# Patient Record
Sex: FEMALE | Race: WHITE | Employment: OTHER | URBAN - METROPOLITAN AREA
[De-identification: names, ages, dates, MRNs, and addresses within clinical notes are randomized per-mention and may not be internally consistent; named-entity substitution may affect disease eponyms.]

---

## 2018-03-16 PROBLEM — M51.26 HNP (HERNIATED NUCLEUS PULPOSUS), LUMBAR: Status: ACTIVE | Noted: 2018-03-16

## 2018-10-02 ENCOUNTER — HOSPITAL ENCOUNTER (OUTPATIENT)
Dept: GENERAL RADIOLOGY | Age: 68
Discharge: HOME | End: 2018-10-02
Attending: RADIOLOGY | Admitting: RADIOLOGY
Payer: MEDICARE

## 2018-10-02 ENCOUNTER — HOSPITAL ENCOUNTER (OUTPATIENT)
Dept: CT IMAGING | Age: 68
Discharge: HOME | End: 2018-10-02
Attending: NEUROLOGICAL SURGERY
Payer: MEDICARE

## 2018-10-02 VITALS
RESPIRATION RATE: 16 BRPM | WEIGHT: 231.06 LBS | SYSTOLIC BLOOD PRESSURE: 128 MMHG | BODY MASS INDEX: 38.45 KG/M2 | DIASTOLIC BLOOD PRESSURE: 60 MMHG | HEART RATE: 87 BPM | TEMPERATURE: 96.9 F | OXYGEN SATURATION: 98 %

## 2018-10-02 DIAGNOSIS — M54.16 LUMBAR RADICULOPATHY: ICD-10-CM

## 2018-10-02 PROCEDURE — 74011636320 HC RX REV CODE- 636/320: Performed by: RADIOLOGY

## 2018-10-02 PROCEDURE — 74011250636 HC RX REV CODE- 250/636: Performed by: RADIOLOGY

## 2018-10-02 PROCEDURE — 62304 MYELOGRAPHY LUMBAR INJECTION: CPT

## 2018-10-02 PROCEDURE — 74011250637 HC RX REV CODE- 250/637: Performed by: RADIOLOGY

## 2018-10-02 PROCEDURE — 72132 CT LUMBAR SPINE W/DYE: CPT

## 2018-10-02 RX ORDER — METOPROLOL SUCCINATE 50 MG/1
TABLET, EXTENDED RELEASE ORAL DAILY
COMMUNITY
End: 2021-08-05

## 2018-10-02 RX ORDER — ALENDRONATE SODIUM 35 MG/1
35 TABLET ORAL
COMMUNITY

## 2018-10-02 RX ORDER — OXYBUTYNIN CHLORIDE 5 MG/1
5 TABLET ORAL DAILY
COMMUNITY
End: 2021-08-05

## 2018-10-02 RX ORDER — HYDROCODONE BITARTRATE AND ACETAMINOPHEN 5; 325 MG/1; MG/1
1 TABLET ORAL
Status: DISCONTINUED | OUTPATIENT
Start: 2018-10-02 | End: 2018-10-02 | Stop reason: HOSPADM

## 2018-10-02 RX ORDER — AZELASTINE 1 MG/ML
2 SPRAY, METERED NASAL 2 TIMES DAILY
COMMUNITY
End: 2021-08-05

## 2018-10-02 RX ORDER — DIAZEPAM 5 MG/1
10 TABLET ORAL
Status: COMPLETED | OUTPATIENT
Start: 2018-10-02 | End: 2018-10-02

## 2018-10-02 RX ORDER — HYDROCODONE BITARTRATE AND ACETAMINOPHEN 5; 325 MG/1; MG/1
TABLET ORAL
COMMUNITY
End: 2021-08-05

## 2018-10-02 RX ORDER — LIDOCAINE HYDROCHLORIDE 10 MG/ML
5 INJECTION, SOLUTION EPIDURAL; INFILTRATION; INTRACAUDAL; PERINEURAL
Status: COMPLETED | OUTPATIENT
Start: 2018-10-02 | End: 2018-10-02

## 2018-10-02 RX ORDER — CYANOCOBALAMIN 1000 UG/ML
1000 INJECTION, SOLUTION INTRAMUSCULAR; SUBCUTANEOUS
COMMUNITY

## 2018-10-02 RX ADMIN — IOPAMIDOL 20 ML: 408 INJECTION, SOLUTION INTRATHECAL at 11:33

## 2018-10-02 RX ADMIN — HYDROCODONE BITARTRATE AND ACETAMINOPHEN 1 TABLET: 5; 325 TABLET ORAL at 12:29

## 2018-10-02 RX ADMIN — DIAZEPAM 10 MG: 5 TABLET ORAL at 11:17

## 2018-10-02 RX ADMIN — Medication 5 ML: at 11:33

## 2018-10-02 NOTE — H&P
The patient is an appropriate candidate to undergo myelogram.    Patient assessed immediately prior to induction. Anesthesia plan as follows: Local/No Anesthesia. History and Physical update:  H&P was reviewed and the patient was examined. No changes have occurred in the patient's condition since the H&P was completed.     Arnoldo Velasco MD  Vascular & Interventional Radiology  [facility]  10/2/2018

## 2018-10-02 NOTE — IP AVS SNAPSHOT
[address] [facility]       [address] [address]  [phone]     Patient: Tae Miller  MRN: [id number]  :1950           About your hospitalization     You were admitted on:  2018 You last received care in the:  [facility] 68 were discharged on:  2018       Why you were hospitalized     Your primary diagnosis was:  Not on File      Follow-up Information     Follow up With Details Comments Giselle Hicks MD   [address] [facility] [address]  [address] [facility] [phone]        Discharge Orders     None       A check yuval indicates which time of day the medication should be taken. My Medications      ASK your doctor about these medications        Instructions Each Dose to Equal    Morning Noon Evening Bedtime    alendronate 35 mg tablet   Commonly known as:  FOSAMAX       Your last dose was: Your next dose is: Take  by mouth every seven (7) days. ALPRAZolam 1 mg tablet   Commonly known as:  XANAX       Your last dose was: Your next dose is: Take 1 mg by mouth nightly as needed. 1 mg                        amitriptyline 25 mg tablet   Commonly known as:  ELAVIL       Your last dose was: Your next dose is: Take 25 mg by mouth nightly. 25 mg                        azelastine 137 mcg (0.1 %) nasal spray   Commonly known as:  ASTELIN       Your last dose was: Your next dose is: 2 Sprays two (2) times a day. Use in each nostril as directed    2 Spray                        cevimeline 30 mg capsule   Commonly known as:  [address]       Your last dose was: Your next dose is: Take 30 mg by mouth three (3) times daily. 30 mg                        cyanocobalamin 1,000 mcg/mL injection   Commonly known as:  VITAMIN B12       Your last dose was:          Your next dose is:              1,000 mcg by IntraMUSCular route once. 1000 mcg                        cyclobenzaprine 10 mg tablet   Commonly known as:  FLEXERIL       Your last dose was: Your next dose is: Take 1 Tab by mouth three (3) times daily as needed for Muscle Spasm(s). 10 mg                        CYMBALTA PO       Your last dose was: Your next dose is: Take 60 mg by mouth daily. 60 mg                        FOLIC ACID PO       Your last dose was: Your next dose is: Take 1 mg by mouth daily. 1 mg                        gabapentin 600 mg tablet   Commonly known as:  NEURONTIN       Your last dose was: Your next dose is: Take 800 mg by mouth two (2) times a day. 800 mg                        HYDROcodone-acetaminophen 5-325 mg per tablet   Commonly known as:  Harrison Side       Your last dose was: Your next dose is: Take  by mouth every six (6) hours as needed for Pain. LEXAPRO 20 mg tablet   Generic drug:  escitalopram oxalate       Your last dose was: Your next dose is: Take 20 mg by mouth daily. 20 mg                        lisinopril 5 mg tablet   Commonly known as:  Milon Kitchen       Your last dose was: Your next dose is: Take 5 mg by mouth daily. 5 mg                        metoprolol succinate 50 mg XL tablet   Commonly known as:  TOPROL-XL       Your last dose was: Your next dose is: Take  by mouth daily. metroNIDAZOLE 0.75 % topical gel   Commonly known as:  METROGEL       Your last dose was: Your next dose is:              Apply  to affected area two (2) times a day. NORVASC PO       Your last dose was: Your next dose is: Take 10 mg by mouth daily. 10 mg                        OMEPRAZOLE PO       Your last dose was:          Your next dose is: Take 20 mg by mouth daily. 20 mg                        oxybutynin 5 mg tablet   Commonly known as:  DITROPAN       Your last dose was: Your next dose is: Take 5 mg by mouth daily. 5 mg                        oxyCODONE-acetaminophen 5-325 mg per tablet   Commonly known as:  PERCOCET       Your last dose was: Your next dose is: Take 1-2 Tabs by mouth every six (6) hours as needed. Max Daily Amount: 8 Tabs. 1-2 Tab                        potassium chloride 20 mEq tablet   Commonly known as:  K-DUR, KLOR-CON       Your last dose was: Your next dose is: Take 20 mEq by mouth daily. 20 mEq                        REMICADE IV       Your last dose was: Your next dose is:              by IntraVENous route every two (2) months. SYNTHROID PO       Your last dose was: Your next dose is: Take 188 mcg by mouth daily. 188 mcg                        ZyrTEC 10 mg tablet   Generic drug:  cetirizine       Your last dose was: Your next dose is: Take 10 mg by mouth daily. 10 mg                                Opioid Education     Prescription Opioids: What You Need to Know:    Prescription opioids can be used to help relieve moderate-to-severe pain and are often prescribed following a surgery or injury, or for certain health conditions. These medications can be an important part of treatment but also come with serious risks. Opioids are strong pain medicines. Examples include hydrocodone, oxycodone, fentanyl, and morphine. Heroin is an example of an illegal opioid. It is important to work with your health care provider to make sure you are getting the safest, most effective care. WHAT ARE THE RISKS AND SIDE EFFECTS OF OPIOID USE? Prescription opioids carry serious risks of addiction and overdose, especially with prolonged use.   An opioid overdose, often marked by slow breathing, can cause sudden death. The use of prescription opioids can have a number of side effects as well, even when taken as directed. · Tolerance-meaning you might need to take more of a medication for the same pain relief  · Physical dependence-meaning you have symptoms of withdrawal when the medication is stopped. Withdrawal symptoms can include nausea, sweating, chills, diarrhea, stomach cramps, and muscle aches. Withdrawal can last up to several weeks, depending on which drug you took and how long you took it. · Increased sensitivity to pain  · Constipation  · Nausea, vomiting, and dry mouth  · Sleepiness and dizziness  · Confusion  · Depression  · Low levels of testosterone that can result in lower sex drive, energy, and strength  · Itching and sweating    RISKS ARE GREATER WITH:       · History of drug misuse, substance use disorder, or overdose  · Mental health conditions (such as depression or anxiety)  · Sleep apnea  · Older age (72 years or older)  · Pregnancy    Avoid alcohol while taking prescription opioids. Also, unless specifically advised by your health care provider, medications to avoid include:  · Benzodiazepines (such as Xanax or Valium)  · Muscle relaxants (such as Soma or Flexeril)  · Hypnotics (such as Ambien or Lunesta)  · Other prescription opioids    KNOW YOUR OPTIONS  Talk to your health care provider about ways to manage your pain that don't involve prescription opioids. Some of these options may actually work better and have fewer risks and side effects. Consult your physician before adding or stopping any medications, treatments, or physical activity. Options may include:  · Pain relievers such as acetaminophen, ibuprofen, and naproxen  · Some medications that are also used for depression or seizures  · Physical therapy and exercise  · Counseling to help patients learn how to cope better with triggers of pain and stress.   · Application of heat or cold compress  · Massage therapy  · Relaxation techniques     Be Informed  Make sure you know the name of your medication, how much and how often to take it, and its potential risks & side effects. IF YOU ARE PRESCRIBED OPIOIDS FOR PAIN:  · Never take opioids in greater amounts or more often than prescribed. Remember the goal is not to be pain-free but to manage your pain at a tolerable level. · Follow up with your primary care provider to:  · Work together to create a plan on how to manage your pain. · Talk about ways to help manage your pain that don't involve prescription opioids. · Talk about any and all concerns and side effects. · Help prevent misuse and abuse. · Never sell or share prescription opioids  · Help prevent misuse and abuse. · Store prescription opioids in a secure place and out of reach of others (this may include visitors, children, friends, and family). · Safely dispose of unused/unwanted prescription opioids: Find your community drug take-back program or your pharmacy mail-back program, or flush them down the toilet, following guidance from the Food and Drug Administration (www.fda.gov/Drugs/ResourcesForYou). · Visit www.cdc.gov/drugoverdose to learn about the risks of opioid abuse and overdose. · If you believe you may be struggling with addiction, tell your health care provider and ask for guidance or call [address] [place] [address] at [phone]. Discharge Instructions       . Willian Charles Please call the [facility] Radiology department at [phone] for specific questions following your exam.   Valium (Diazepam) Discharge instructions    You received the narcotic listed above.  This medication was given to you to help decrease your anxiety/discomfort and allow you to complete your examination       Common Side Effects:    Impairment of memory for up to 24 hours, sleepiness, slurred speech, dizziness, visual disturbance such as blurred vision or difficulty focusing, confusion, euphoria (pain relief), chills, ears feeling blocked or dreaming. It is recommended that you do not drive or operate equipment for at least 24 hours. You should not drink any alcoholic beverages for at least 24 hours. Do not take any other muscle relaxers, sedatives, hypnotics, or mood altering medications for 24 hours unless ordered by your physician who is aware that you received this medication. Call your physician for any questions or problems. He/She may contact this Radiology Department for further information. Ana Iniguez RN     Myelogram: About This Test  What is it? A myelogram uses X-rays and a special dye to make pictures of bones and nerves of the spine (spinal canal). The spinal canal holds the spinal cord, the spinal nerve roots, and the fluid-filled space between the bones in your spine. Why is this test done? A myelogram is done to check for:  · The cause of arm or leg numbness, weakness, or pain. · Narrowing of the spinal canal (spinal stenosis). · A tumor or infection causing problems with the spinal cord or nerve roots. · A spinal disc that has ruptured (herniated disc). · Inflammation of the membrane that covers the brain and spinal cord. · Problems with the blood vessels to the spine. How can you prepare for the test?  Your doctor will tell you if you need to change how much you eat and drink before the myelogram. You may be asked to increase the amount of water you drink before the test. Follow the instructions your doctor gives you about eating and drinking. Before a myelogram, tell your doctor if:  · You are allergic to any medicines, contrast material, or iodine dye. · You have had bleeding problems, or you take a blood thinner, such as aspirin, clopidogrel (Plavix), or warfarin (Coumadin), or you take over-the-counter medicines, such as ibuprofen (Advil, Motrin) or naproxen (Aleve).  Your doctor will tell you when you should stop taking these medicines several days before your procedure. Make sure that you understand exactly what he or she wants you to do. · You have had kidney problems. · You have diabetes, especially if you take metformin (Glucophage, for example). · You are or might be pregnant. Ask someone to take you home and stay with you after the test.  What happens during the test?  The dye is put into your spinal canal with a thin needle. This is called a lumbar puncture. The dye moves through the space so the nerve roots and spinal cord can be seen more clearly. After the dye is put in, you will lie still while the X-ray pictures are taken. How does it feel? You will feel a quick sting from a small needle that has medicine to numb the skin on your back. You will also feel some pressure as the long, thin spinal needle is put into your spinal canal. You may feel a quick sharp pain down your buttock or leg when the needle is moved in your spine. The dye may make you feel warm and flushed and have a metallic taste in your mouth. What else should you know about the test?  In rare cases, the hole made by the needle in the sac around the spine does not close normally. This can allow spinal fluid to leak out. This leak may need to be repaired through a procedure called an epidural blood patch. To do the patch, your doctor injects some of your own blood to cover the hole. How long does the test take? · A myelogram usually takes 30 minutes to 1 hour. What happens after the test?  · You will probably be able to go home 30 minutes to 2 hours after the test.  · You may need to lie in bed with your head raised for 4 to 24 hours after the test. To prevent seizures, which are a rare side effect, do not bend over or lie down with your head lower than your body.  Keeping your head higher than your body after a myelogram also may help prevent or reduce other side effects, such as headache, nausea, or vomiting. · Avoid strenuous activity, such as running or heavy lifting, for at least 1 day after your myelogram.  · Drink plenty of water after the myelogram. Your doctor will give you instructions on taking your regular medicines. When should you call for help? Call 911 anytime you think you may need emergency care. For example, call if:  · You have a seizure. Call your doctor now or seek immediate medical care if:  · You have any increase in pain, weakness, or numbness in your legs. · You have a severe headache or stiff neck, or your eyes become very sensitive to light. · You have a headache that lasts longer than 24 hours. · You have problems urinating or having a bowel movement. · You have a fever. Follow-up care is a key part of your treatment and safety. Be sure to make and go to all appointments, and call your doctor if you are having problems. It's also a good idea to keep a list of the medicines you take. Ask your doctor when you can expect to have your test results. Where can you learn more? Go to http://aleta-yusuf.info/. Enter Z180 in the search box to learn more about \"Myelogram: About This Test.\"  Current as of: October 9, 2017  Content Version: 11.7  © 9888-2411 [employer]. Care instructions adapted under license by [employer] (which disclaims liability or warranty for this information). If you have questions about a medical condition or this instruction, always ask your healthcare professional. [place] [address] any warranty or liability for your use of this information.     DISCHARGE SUMMARY from Nurse    PATIENT INSTRUCTIONS:    After general anesthesia or intravenous sedation, for 24 hours or while taking prescription Narcotics:  · Limit your activities  · Do not drive and operate hazardous machinery  · Do not make important personal or business decisions  · Do  not drink alcoholic beverages  · If you have not urinated within 8 hours after discharge, please contact your surgeon on call. Report the following to your surgeon:  · Excessive pain, swelling, redness or odor of or around the surgical area  · Temperature over 100.5  · Nausea and vomiting lasting longer than 4 hours or if unable to take medications  · Any signs of decreased circulation or nerve impairment to extremity: change in color, persistent  numbness, tingling, coldness or increase pain  · Any questions    What to do at Home:  Recommended activity: Activity as tolerated and no driving for today  These are general instructions for a healthy lifestyle:    No smoking/ No tobacco products/ Avoid exposure to second hand smoke  Surgeon General's Warning:  Quitting smoking now greatly reduces serious risk to your health. Obesity, smoking, and sedentary lifestyle greatly increases your risk for illness    A healthy diet, regular physical exercise & weight monitoring are important for maintaining a healthy lifestyle    You may be retaining fluid if you have a history of heart failure or if you experience any of the following symptoms:  Weight gain of 3 pounds or more overnight or 5 pounds in a week, increased swelling in our hands or feet or shortness of breath while lying flat in bed. Please call your doctor as soon as you notice any of these symptoms; do not wait until your next office visit. Recognize signs and symptoms of STROKE:    F-face looks uneven    A-arms unable to move or move unevenly    S-speech slurred or non-existent    T-time-call 911 as soon as signs and symptoms begin-DO NOT go       Back to bed or wait to see if you get better-TIME IS BRAIN. Warning Signs of HEART ATTACK     Call 911 if you have these symptoms:   Chest discomfort. Most heart attacks involve discomfort in the center of the chest that lasts more than a few minutes, or that goes away and comes back.  It can feel like uncomfortable pressure, squeezing, fullness, or pain.   Discomfort in other areas of the upper body. Symptoms can include pain or discomfort in one or both arms, the back, neck, jaw, or stomach.  Shortness of breath with or without chest discomfort.  Other signs may include breaking out in a cold sweat, nausea, or lightheadedness. Don't wait more than five minutes to call [address]! Fast action can save your life. Calling 911 is almost always the fastest way to get lifesaving treatment. Emergency Medical Services staff can begin treatment when they arrive  up to an hour sooner than if someone gets to the hospital by car. The discharge information has been reviewed with the patient. The patient verbalized understanding. Discharge medications reviewed with the patient and appropriate educational materials and side effects teaching were provided. Patient armband removed and given to patient to take home. Patient was informed of the privacy risks if armband lost or stolen. Introducing [facility]! [facility] introduces [employer] patient portal. Now you can access parts of your medical record, email your doctor's office, and request medication refills online. 1. In your internet browser, go to [email or web address] [employer]   2. Click on the First Time User? Click Here link in the Sign In box. You will see the New Member Sign Up page. 3. Enter your [employer] Access Code exactly as it appears below. You will not need to use this code after youve completed the sign-up process. If you do not sign up before the expiration date, you must request a new code. · [employer] Access Code: [email or web address]  Expires: 10/8/2018  1:03 PM    4. Enter the last four digits of your Social Security Number (xxxx) and Date of Birth (mm/dd/yyyy) as indicated and click Submit. You will be taken to the next sign-up page. 5. Create a [employer] ID.  This will be your [employer] login ID and cannot be changed, so think of one that is secure and easy to remember. 6. Create a [facility] password. You can change your password at any time. 7. Enter your Password Reset Question and Answer. This can be used at a later time if you forget your password. 8. Enter your e-mail address. You will receive e-mail notification when new information is available in [address] 9. Click Sign Up. You can now view and download portions of your medical record. 10. Click the Download Summary menu link to download a portable copy of your medical information. If you have questions, please visit the Frequently Asked Questions section of the [facility] website. Remember, [facility] is NOT to be used for urgent needs. For medical emergencies, dial 911. Now available from your iPhone and Android! Introducing Christopher Rizo       As a [employer] patient, I wanted to make you aware of our electronic visit tool called Christopher Rizo. [employer] 24/7 allows you to connect within minutes with a medical provider 24 hours a day, seven days a week via a mobile device or tablet or logging into a secure website from your computer. You can access Christopher Rizo from anywhere in the [place]. A virtual visit might be right for you when you have a simple condition and feel like you just dont want to get out of bed, or cant get away from work for an appointment, when your regular [employer] provider is not available (evenings, weekends or holidays), or when youre out of town and need minor care. Electronic visits cost only $49 and if the [employer] 24/7 provider determines a prescription is needed to treat your condition, one can be electronically transmitted to a nearby pharmacy*. Please take a moment to enroll today if you have not already done so. The enrollment process is free and takes just a few minutes. To enroll, please download the [employer] 24/7 alyson to your tablet or phone, or visit [email or web address] to enroll on your computer.     And, as an [address] [place] [address] patient with a [employer] account, the results of your visits will be scanned into your electronic medical record and your primary care provider will be able to view the scanned results. We urge you to continue to see your regular [facility] provider for your ongoing medical care. And while your primary care provider may not be the one available when you seek a Christopher Rizo virtual visit, the peace of mind you get from getting a real diagnosis real time can be priceless. For more information on Christopher Rizo, view our Frequently Asked Questions (FAQs) at [email or web address].     Sincerely,    Johnnie Bashir MD  Chief Medical Officer  Sandrine Calabrese     *:  certain medications cannot be prescribed via [facility] 24/7         Unresulted Labs-Please follow up with your PCP about these lab tests     Order Current Status    XR MYELO LUMBAR In process      Providers Seen During Your Hospitalization     Provider Specialty Primary office phone    Doreen Glover MD Radiology [phone]      Your Primary Care Physician (PCP)     Primary Care Physician Office Phone Office [address] [phone]      You are allergic to the following     Allergen Reactions    Other Food Hives  Unknown (comments)         Amoxicillin Rash         Erythromycin Anaphylaxis         Pcn (Penicillins) Anaphylaxis         Bactrim (Sulfamethoprim Ds) Unknown (comments)         Buckwheat Other (comments)         Cayenne Hives         Ceclor (Cefaclor) Unknown (comments)  Rash    Heavy rash         Cheese Other (comments)         Clarithromycin Unknown (comments)         Egg Runny Nose    Eyes water, runny nose         Fish Containing Products Rash    Jeanette         Gluten Other (comments)         Lactose Runny Nose    Eyes water, runny nose         Nuts (Nut - Unspecified) Hives  Runny Nose  Other (comments)    Pistachio also cause eyes to water Oats Other (comments)         Safflower Oil Hives         Sesame Seed Other (comments)         Sulfa (Sulfonamide Antibiotics) Unknown (comments)         Sulfamethoxazole-Trimethoprim Unknown (comments)  Rash    Rash         Sulfur Hives  Itching         Tetracycline Rash    Swelling         Tetracyclines Unknown (comments)         Wheat Other (comments)      Recent Documentation     Weight BMI OB Status Smoking Status          104.8 kg 38.45 kg/m2 Hysterectomy Never Smoker           Emergency Contacts       Name Discharge Info Relation Home Work [address] CAREGIVER [3] Spouse [3]   [phone]      Patient Belongings     The following personal items are in your possession at time of discharge:    Dental Appliances: None         Home Medications: None   Jewelry: None  Clothing: Pants, Shirt, Footwear    Other Valuables: Cane              Please provide this summary of care documentation to your next provider. Signatures-by signing, you are acknowledging that this After Visit Summary has been reviewed with you and you have received a copy.                       Patient Signature:  ____________________________________________________________    Date:  ____________________________________________________________      `           Provider Signature:  ____________________________________________________________    Date:  ____________________________________________________________

## 2018-10-02 NOTE — PROCEDURES
Vascular & Interventional Radiology Brief Procedure Note    Interventional Radiologist: Jan Hyman    Pre-operative Diagnosis:  Back pain    Post-operative Diagnosis: Same as pre-op dx    Procedure(s) Performed:  Lumbar myelogram    Anesthesia:  Local      Findings:  Successful lumbar myelogram via L3 puncture.      Complications: None    Estimated Blood Loss:  minimal    Tubes and Drains: None    Specimens: None    Condition: Good    Disposition:  home    Plan: observation/bedrest x4hrs      Yfn Farnsworth MD  [address] [address]  Vascular & Interventional Radiology  10/2/2018

## 2018-10-02 NOTE — PERIOP NOTES
Phase 2 Recovery Summary  Patient arrived to Phase 2 at 1210  Report received from No report received  Vitals:    10/02/18 1016 10/02/18 1210   BP: 153/80 126/59   Pulse: 93 87   Resp: 16 16   Temp: 96.9 °F (36.1 °C)    SpO2: 96% 97%   Weight: 104.8 kg (231 lb 1 oz)        oriented to time, place, person and situation    Lines and Drains  Peripheral Intravenous Line:      Wound  Wound Back Lower (Active)   Number of days:200       Wound Back (Active)   Number of days:200              Patient discharged to home with , Jamesfercholawrence Rivera  at [place] [address]

## 2018-10-02 NOTE — DISCHARGE INSTRUCTIONS
..Please call the [facility] Radiology department at [phone] for specific questions following your exam.   Valium (Diazepam) Discharge instructions    You received the narcotic listed above. This medication was given to you to help decrease your anxiety/discomfort and allow you to complete your examination       Common Side Effects:    Impairment of memory for up to 24 hours, sleepiness, slurred speech, dizziness, visual disturbance such as blurred vision or difficulty focusing, confusion, euphoria (pain relief), chills, ears feeling blocked or dreaming. It is recommended that you do not drive or operate equipment for at least 24 hours. You should not drink any alcoholic beverages for at least 24 hours. Do not take any other muscle relaxers, sedatives, hypnotics, or mood altering medications for 24 hours unless ordered by your physician who is aware that you received this medication. Call your physician for any questions or problems. He/She may contact this Radiology Department for further information. Alvarez Robertson RN     Myelogram: About This Test  What is it? A myelogram uses X-rays and a special dye to make pictures of bones and nerves of the spine (spinal canal). The spinal canal holds the spinal cord, the spinal nerve roots, and the fluid-filled space between the bones in your spine. Why is this test done? A myelogram is done to check for:  · The cause of arm or leg numbness, weakness, or pain. · Narrowing of the spinal canal (spinal stenosis). · A tumor or infection causing problems with the spinal cord or nerve roots. · A spinal disc that has ruptured (herniated disc). · Inflammation of the membrane that covers the brain and spinal cord. · Problems with the blood vessels to the spine.   How can you prepare for the test?  Your doctor will tell you if you need to change how much you eat and drink before the myelogram. You may be asked to increase the amount of water you drink before the test. Follow the instructions your doctor gives you about eating and drinking. Before a myelogram, tell your doctor if:  · You are allergic to any medicines, contrast material, or iodine dye. · You have had bleeding problems, or you take a blood thinner, such as aspirin, clopidogrel (Plavix), or warfarin (Coumadin), or you take over-the-counter medicines, such as ibuprofen (Advil, Motrin) or naproxen (Aleve). Your doctor will tell you when you should stop taking these medicines several days before your procedure. Make sure that you understand exactly what he or she wants you to do. · You have had kidney problems. · You have diabetes, especially if you take metformin (Glucophage, for example). · You are or might be pregnant. Ask someone to take you home and stay with you after the test.  What happens during the test?  The dye is put into your spinal canal with a thin needle. This is called a lumbar puncture. The dye moves through the space so the nerve roots and spinal cord can be seen more clearly. After the dye is put in, you will lie still while the X-ray pictures are taken. How does it feel? You will feel a quick sting from a small needle that has medicine to numb the skin on your back. You will also feel some pressure as the long, thin spinal needle is put into your spinal canal. You may feel a quick sharp pain down your buttock or leg when the needle is moved in your spine. The dye may make you feel warm and flushed and have a metallic taste in your mouth. What else should you know about the test?  In rare cases, the hole made by the needle in the sac around the spine does not close normally. This can allow spinal fluid to leak out. This leak may need to be repaired through a procedure called an epidural blood patch. To do the patch, your doctor injects some of your own blood to cover the hole. How long does the test take? · A myelogram usually takes 30 minutes to 1 hour.   What happens after the test?  · You will probably be able to go home 30 minutes to 2 hours after the test.  · You may need to lie in bed with your head raised for 4 to 24 hours after the test. To prevent seizures, which are a rare side effect, do not bend over or lie down with your head lower than your body. Keeping your head higher than your body after a myelogram also may help prevent or reduce other side effects, such as headache, nausea, or vomiting. · Avoid strenuous activity, such as running or heavy lifting, for at least 1 day after your myelogram.  · Drink plenty of water after the myelogram. Your doctor will give you instructions on taking your regular medicines. When should you call for help? Call 911 anytime you think you may need emergency care. For example, call if:  · You have a seizure. Call your doctor now or seek immediate medical care if:  · You have any increase in pain, weakness, or numbness in your legs. · You have a severe headache or stiff neck, or your eyes become very sensitive to light. · You have a headache that lasts longer than 24 hours. · You have problems urinating or having a bowel movement. · You have a fever. Follow-up care is a key part of your treatment and safety. Be sure to make and go to all appointments, and call your doctor if you are having problems. It's also a good idea to keep a list of the medicines you take. Ask your doctor when you can expect to have your test results. Where can you learn more? Go to http://aleta-yusuf.info/. Enter K021 in the search box to learn more about \"Myelogram: About This Test.\"  Current as of: October 9, 2017  Content Version: 11.7  © 1982-1874 [employer]. Care instructions adapted under license by [employer] (which disclaims liability or warranty for this information).  If you have questions about a medical condition or this instruction, always ask your healthcare professional. [employer] disclaims any warranty or liability for your use of this information. DISCHARGE SUMMARY from Nurse    PATIENT INSTRUCTIONS:    After general anesthesia or intravenous sedation, for 24 hours or while taking prescription Narcotics:  · Limit your activities  · Do not drive and operate hazardous machinery  · Do not make important personal or business decisions  · Do  not drink alcoholic beverages  · If you have not urinated within 8 hours after discharge, please contact your surgeon on call. Report the following to your surgeon:  · Excessive pain, swelling, redness or odor of or around the surgical area  · Temperature over 100.5  · Nausea and vomiting lasting longer than 4 hours or if unable to take medications  · Any signs of decreased circulation or nerve impairment to extremity: change in color, persistent  numbness, tingling, coldness or increase pain  · Any questions    What to do at Home:  Recommended activity: Activity as tolerated and no driving for today  These are general instructions for a healthy lifestyle:    No smoking/ No tobacco products/ Avoid exposure to second hand smoke  Surgeon General's Warning:  Quitting smoking now greatly reduces serious risk to your health. Obesity, smoking, and sedentary lifestyle greatly increases your risk for illness    A healthy diet, regular physical exercise & weight monitoring are important for maintaining a healthy lifestyle    You may be retaining fluid if you have a history of heart failure or if you experience any of the following symptoms:  Weight gain of 3 pounds or more overnight or 5 pounds in a week, increased swelling in our hands or feet or shortness of breath while lying flat in bed. Please call your doctor as soon as you notice any of these symptoms; do not wait until your next office visit.     Recognize signs and symptoms of STROKE:    F-face looks uneven    A-arms unable to move or move unevenly    S-speech slurred or non-existent    T-time-call 911 as soon as signs and symptoms begin-DO NOT go       Back to bed or wait to see if you get better-TIME IS BRAIN. Warning Signs of HEART ATTACK     Call 911 if you have these symptoms:   Chest discomfort. Most heart attacks involve discomfort in the center of the chest that lasts more than a few minutes, or that goes away and comes back. It can feel like uncomfortable pressure, squeezing, fullness, or pain.  Discomfort in other areas of the upper body. Symptoms can include pain or discomfort in one or both arms, the back, neck, jaw, or stomach.  Shortness of breath with or without chest discomfort.  Other signs may include breaking out in a cold sweat, nausea, or lightheadedness. Don't wait more than five minutes to call 911 - MINUTES MATTER! Fast action can save your life. Calling 911 is almost always the fastest way to get lifesaving treatment. Emergency Medical Services staff can begin treatment when they arrive -- up to an hour sooner than if someone gets to the hospital by car. The discharge information has been reviewed with the patient. The patient verbalized understanding. Discharge medications reviewed with the patient and appropriate educational materials and side effects teaching were provided. Patient armband removed and given to patient to take home. Patient was informed of the privacy risks if armband lost or stolen.

## 2018-10-26 ENCOUNTER — HOSPITAL ENCOUNTER (OUTPATIENT)
Dept: CT IMAGING | Age: 68
Discharge: HOME | End: 2018-10-26
Attending: RADIOLOGY | Admitting: RADIOLOGY
Payer: MEDICARE

## 2018-10-26 VITALS
HEIGHT: 65 IN | SYSTOLIC BLOOD PRESSURE: 140 MMHG | HEART RATE: 69 BPM | TEMPERATURE: 97 F | WEIGHT: 244 LBS | BODY MASS INDEX: 40.65 KG/M2 | RESPIRATION RATE: 16 BRPM | DIASTOLIC BLOOD PRESSURE: 75 MMHG | OXYGEN SATURATION: 92 %

## 2018-10-26 DIAGNOSIS — M54.50 LOW BACK PAIN: ICD-10-CM

## 2018-10-26 LAB
ANION GAP SERPL CALC-SCNC: 6 MMOL/L (ref 3–18)
APTT PPP: 33.5 SEC (ref 23–36.4)
BUN SERPL-MCNC: 13 MG/DL (ref 7–18)
BUN/CREAT SERPL: 19 (ref 12–20)
CALCIUM SERPL-MCNC: 9.5 MG/DL (ref 8.5–10.1)
CHLORIDE SERPL-SCNC: 102 MMOL/L (ref 100–108)
CO2 SERPL-SCNC: 30 MMOL/L (ref 21–32)
CREAT SERPL-MCNC: 0.7 MG/DL (ref 0.6–1.3)
ERYTHROCYTE [DISTWIDTH] IN BLOOD BY AUTOMATED COUNT: 15.7 % (ref 11.6–14.5)
GLUCOSE SERPL-MCNC: 88 MG/DL (ref 74–99)
HCT VFR BLD AUTO: 36.3 % (ref 35–45)
HGB BLD-MCNC: 11.8 G/DL (ref 12–16)
INR PPP: 0.9 (ref 0.8–1.2)
MCH RBC QN AUTO: 33.3 PG (ref 24–34)
MCHC RBC AUTO-ENTMCNC: 32.5 G/DL (ref 31–37)
MCV RBC AUTO: 102.5 FL (ref 74–97)
PLATELET # BLD AUTO: 359 K/UL (ref 135–420)
PMV BLD AUTO: 9.2 FL (ref 9.2–11.8)
POTASSIUM SERPL-SCNC: 4.7 MMOL/L (ref 3.5–5.5)
PROTHROMBIN TIME: 11.6 SEC (ref 11.5–15.2)
RBC # BLD AUTO: 3.54 M/UL (ref 4.2–5.3)
SODIUM SERPL-SCNC: 138 MMOL/L (ref 136–145)
WBC # BLD AUTO: 7.8 K/UL (ref 4.6–13.2)

## 2018-10-26 PROCEDURE — 74011250636 HC RX REV CODE- 250/636: Performed by: RADIOLOGY

## 2018-10-26 PROCEDURE — 85027 COMPLETE CBC AUTOMATED: CPT | Performed by: RADIOLOGY

## 2018-10-26 PROCEDURE — 87102 FUNGUS ISOLATION CULTURE: CPT | Performed by: NEUROLOGICAL SURGERY

## 2018-10-26 PROCEDURE — 88173 CYTOPATH EVAL FNA REPORT: CPT | Performed by: NEUROLOGICAL SURGERY

## 2018-10-26 PROCEDURE — 87116 MYCOBACTERIA CULTURE: CPT | Performed by: NEUROLOGICAL SURGERY

## 2018-10-26 PROCEDURE — 88305 TISSUE EXAM BY PATHOLOGIST: CPT | Performed by: NEUROLOGICAL SURGERY

## 2018-10-26 PROCEDURE — 87070 CULTURE OTHR SPECIMN AEROBIC: CPT | Performed by: NEUROLOGICAL SURGERY

## 2018-10-26 PROCEDURE — 85610 PROTHROMBIN TIME: CPT | Performed by: RADIOLOGY

## 2018-10-26 PROCEDURE — 80048 BASIC METABOLIC PNL TOTAL CA: CPT | Performed by: RADIOLOGY

## 2018-10-26 PROCEDURE — 99152 MOD SED SAME PHYS/QHP 5/>YRS: CPT

## 2018-10-26 PROCEDURE — 85730 THROMBOPLASTIN TIME PARTIAL: CPT | Performed by: RADIOLOGY

## 2018-10-26 RX ORDER — HYDROCODONE BITARTRATE AND ACETAMINOPHEN 7.5; 325 MG/1; MG/1
1 TABLET ORAL
Status: CANCELLED | OUTPATIENT
Start: 2018-10-26

## 2018-10-26 RX ORDER — FENTANYL CITRATE 50 UG/ML
25-200 INJECTION, SOLUTION INTRAMUSCULAR; INTRAVENOUS
Status: DISCONTINUED | OUTPATIENT
Start: 2018-10-26 | End: 2018-10-30 | Stop reason: HOSPADM

## 2018-10-26 RX ORDER — LIDOCAINE HYDROCHLORIDE 10 MG/ML
1-20 INJECTION, SOLUTION EPIDURAL; INFILTRATION; INTRACAUDAL; PERINEURAL
Status: COMPLETED | OUTPATIENT
Start: 2018-10-26 | End: 2018-10-26

## 2018-10-26 RX ORDER — MIDAZOLAM HYDROCHLORIDE 1 MG/ML
.5-4 INJECTION, SOLUTION INTRAMUSCULAR; INTRAVENOUS
Status: DISCONTINUED | OUTPATIENT
Start: 2018-10-26 | End: 2018-10-30 | Stop reason: HOSPADM

## 2018-10-26 RX ORDER — SODIUM CHLORIDE 9 MG/ML
20 INJECTION, SOLUTION INTRAVENOUS CONTINUOUS
Status: DISCONTINUED | OUTPATIENT
Start: 2018-10-26 | End: 2018-10-30 | Stop reason: HOSPADM

## 2018-10-26 RX ORDER — HYDROCODONE BITARTRATE AND ACETAMINOPHEN 7.5; 325 MG/1; MG/1
1 TABLET ORAL
Status: DISCONTINUED | OUTPATIENT
Start: 2018-10-26 | End: 2018-10-30 | Stop reason: HOSPADM

## 2018-10-26 RX ADMIN — FENTANYL CITRATE 50 MCG: 50 INJECTION, SOLUTION INTRAMUSCULAR; INTRAVENOUS at 10:04

## 2018-10-26 RX ADMIN — MIDAZOLAM 1 MG: 1 INJECTION INTRAMUSCULAR; INTRAVENOUS at 10:04

## 2018-10-26 RX ADMIN — SODIUM CHLORIDE 20 ML/HR: 900 INJECTION, SOLUTION INTRAVENOUS at 07:38

## 2018-10-26 RX ADMIN — MIDAZOLAM 1 MG: 1 INJECTION INTRAMUSCULAR; INTRAVENOUS at 10:10

## 2018-10-26 RX ADMIN — LIDOCAINE HYDROCHLORIDE 10 ML: 10 INJECTION, SOLUTION EPIDURAL; INFILTRATION; INTRACAUDAL; PERINEURAL at 10:14

## 2018-10-26 RX ADMIN — FENTANYL CITRATE 50 MCG: 50 INJECTION, SOLUTION INTRAMUSCULAR; INTRAVENOUS at 09:58

## 2018-10-26 NOTE — H&P
VASCULAR & INTERVENTIONAL RADIOLOGY PROGRESS NOTE    History and Physical reviewed; I have examined the patient and there are no pertinent changes. Pt is an appropriate candidate to undergo right lumbosacral lesion asp/bx under moderate sedation.       Fran Ferguson MD, MD  Vascular & Interventional Radiology  [facility]  10/26/2018

## 2018-10-26 NOTE — PERIOP NOTES
Phase 2 Recovery Summary  Patient arrived to Phase 2 at 1042  Report received from [place]:    10/26/18 1100 10/26/18 1115 10/26/18 1130 10/26/18 1200   BP: 144/69 149/74 139/69 140/75   Pulse:       Resp:       Temp:       SpO2: 97% 96% 95% 92%   Weight:       Height:           oriented to time, place, person and situation    Lines and Drains  Peripheral Intravenous Line:   Peripheral IV 10/26/18 Left Hand (Active)   Site Assessment Clean, dry, & intact 10/26/2018 10:54 AM   Phlebitis Assessment 0 10/26/2018 10:54 AM   Infiltration Assessment 0 10/26/2018 10:54 AM   Dressing Status Clean, dry, & intact 10/26/2018 10:54 AM   Dressing Type Tape;Transparent 10/26/2018 10:54 AM   Hub Color/Line Status Pink; Infusing 10/26/2018 10:54 AM       Wound  Wound Back Lower (Active)   Number of days: 224       Wound Back (Active)   Number of days: 224          Patient discharged to home with , Steph Lopez  at [address] [facility]

## 2018-10-26 NOTE — PROGRESS NOTES
TRANSFER - OUT REPORT:    Verbal report given to Moni MEI(name) on Heather Marinelli  being transferred to [facility] POD 2(unit) for routine progression of care       Report consisted of patients Situation, Background, Assessment and   Recommendations(SBAR). Information from the following report(s) SBAR, Procedure Summary, MAR and Cardiac Rhythm SR was reviewed with the receiving nurse. Lines:   Peripheral IV 10/26/18 Left Hand (Active)   Site Assessment Clean, dry, & intact 10/26/2018  7:38 AM   Phlebitis Assessment 0 10/26/2018  7:38 AM   Infiltration Assessment 0 10/26/2018  7:38 AM   Dressing Status Clean, dry, & intact 10/26/2018  7:38 AM   Dressing Type Transparent;Tape 10/26/2018  7:38 AM   Hub Color/Line Status Orange; Infusing 10/26/2018  7:38 AM        Opportunity for questions and clarification was provided.       Patient transported with:   [email or web address]

## 2018-10-26 NOTE — PROGRESS NOTES
Pt educated about procedure and sedation, verbalizes understanding. Pt denies complications from sedation/anesthesia in the past. Denies use of anticoagulants/antiplatelet medications. Per RN Romero, heart tones and lung sounds within normal limits, pt denies abdominal pain, skin warm and dry. Will continue to monitor.

## 2018-10-26 NOTE — PERIOP NOTES
Report received from [place], [place] Nurse. She will request pain med order and discharge times from Dr. Sandra Shields. Pt arrived by tech. Alert. Oriented to room. Discussed POC. , Janelle Alfredo by bedside.

## 2018-10-26 NOTE — Clinical Note
RN Romero left message with Estefani Galindo at [facility] to have Dr. Evan Prado or his PA Mikey Mendoza to call us stat regarding orders for specimen.

## 2018-10-31 LAB
BACTERIA SPEC CULT: NORMAL
GRAM STN SPEC: NORMAL
GRAM STN SPEC: NORMAL
SERVICE CMNT-IMP: NORMAL

## 2018-11-26 LAB
BACTERIA SPEC CULT: NORMAL
FUNGUS SMEAR,FNGSMR: NORMAL
SERVICE CMNT-IMP: NORMAL

## 2018-12-08 LAB
ACID FAST STN SPEC: NEGATIVE
MYCOBACTERIUM SPEC QL CULT: NEGATIVE
SPECIMEN PREPARATION: NORMAL
SPECIMEN SOURCE: NORMAL

## 2021-08-27 PROBLEM — M48.04 THORACIC STENOSIS: Status: ACTIVE | Noted: 2021-08-27

## 2021-09-09 PROBLEM — A41.9 SEPSIS (HCC): Status: ACTIVE | Noted: 2021-09-09

## 2021-10-14 ENCOUNTER — APPOINTMENT (OUTPATIENT)
Dept: GENERAL RADIOLOGY | Age: 71
DRG: 862 | End: 2021-10-14
Attending: STUDENT IN AN ORGANIZED HEALTH CARE EDUCATION/TRAINING PROGRAM
Payer: MEDICARE

## 2021-10-14 ENCOUNTER — HOSPITAL ENCOUNTER (INPATIENT)
Age: 71
LOS: 7 days | Discharge: NURSING, REHAB OR LONG-TERM CARE | DRG: 862 | End: 2021-10-21
Attending: STUDENT IN AN ORGANIZED HEALTH CARE EDUCATION/TRAINING PROGRAM | Admitting: FAMILY MEDICINE
Payer: MEDICARE

## 2021-10-14 ENCOUNTER — APPOINTMENT (OUTPATIENT)
Dept: CT IMAGING | Age: 71
DRG: 862 | End: 2021-10-14
Attending: STUDENT IN AN ORGANIZED HEALTH CARE EDUCATION/TRAINING PROGRAM
Payer: MEDICARE

## 2021-10-14 DIAGNOSIS — N39.0 URINARY TRACT INFECTION WITHOUT HEMATURIA, SITE UNSPECIFIED: ICD-10-CM

## 2021-10-14 DIAGNOSIS — M79.89 LEG SWELLING: ICD-10-CM

## 2021-10-14 DIAGNOSIS — R41.82 ALTERED MENTAL STATUS, UNSPECIFIED ALTERED MENTAL STATUS TYPE: ICD-10-CM

## 2021-10-14 DIAGNOSIS — M79.606 PAIN OF LOWER EXTREMITY, UNSPECIFIED LATERALITY: ICD-10-CM

## 2021-10-14 DIAGNOSIS — E87.1 HYPONATREMIA: Primary | ICD-10-CM

## 2021-10-14 PROBLEM — A41.9 SEPSIS (HCC): Status: ACTIVE | Noted: 2021-10-14

## 2021-10-14 LAB
ALBUMIN SERPL-MCNC: 2 G/DL (ref 3.5–5)
ALBUMIN/GLOB SERPL: 0.4 {RATIO} (ref 1.1–2.2)
ALP SERPL-CCNC: 124 U/L (ref 45–117)
ALT SERPL-CCNC: 13 U/L (ref 12–78)
ANION GAP SERPL CALC-SCNC: 8 MMOL/L (ref 5–15)
APPEARANCE UR: ABNORMAL
AST SERPL W P-5'-P-CCNC: 25 U/L (ref 15–37)
BACTERIA URNS QL MICRO: ABNORMAL /HPF
BASOPHILS # BLD: 0 K/UL (ref 0–0.1)
BASOPHILS NFR BLD: 0 % (ref 0–1)
BILIRUB SERPL-MCNC: 0.4 MG/DL (ref 0.2–1)
BILIRUB UR QL: NEGATIVE
BUN SERPL-MCNC: 11 MG/DL (ref 6–20)
BUN/CREAT SERPL: 21 (ref 12–20)
CA-I BLD-MCNC: 8.6 MG/DL (ref 8.5–10.1)
CHLORIDE SERPL-SCNC: 89 MMOL/L (ref 97–108)
CK SERPL-CCNC: 89 U/L (ref 26–192)
CO2 SERPL-SCNC: 27 MMOL/L (ref 21–32)
COLOR UR: ABNORMAL
COVID-19 RAPID TEST, COVR: NOT DETECTED
CREAT SERPL-MCNC: 0.52 MG/DL (ref 0.55–1.02)
DIFFERENTIAL METHOD BLD: ABNORMAL
EOSINOPHIL # BLD: 0.3 K/UL (ref 0–0.4)
EOSINOPHIL NFR BLD: 2 % (ref 0–7)
ERYTHROCYTE [DISTWIDTH] IN BLOOD BY AUTOMATED COUNT: 15.9 % (ref 11.5–14.5)
GLOBULIN SER CALC-MCNC: 4.9 G/DL (ref 2–4)
GLUCOSE SERPL-MCNC: 77 MG/DL (ref 65–100)
GLUCOSE UR STRIP.AUTO-MCNC: NEGATIVE MG/DL
HCT VFR BLD AUTO: 28.5 % (ref 35–47)
HGB BLD-MCNC: 9.1 G/DL (ref 11.5–16)
HGB UR QL STRIP: ABNORMAL
IMM GRANULOCYTES # BLD AUTO: 0 K/UL
IMM GRANULOCYTES NFR BLD AUTO: 0 %
KETONES UR QL STRIP.AUTO: 5 MG/DL
LACTATE SERPL-SCNC: 0.7 MMOL/L (ref 0.4–2)
LEUKOCYTE ESTERASE UR QL STRIP.AUTO: ABNORMAL
LIPASE SERPL-CCNC: 70 U/L (ref 73–393)
LYMPHOCYTES # BLD: 1.5 K/UL (ref 0.8–3.5)
LYMPHOCYTES NFR BLD: 10 % (ref 12–49)
MAGNESIUM SERPL-MCNC: 2 MG/DL (ref 1.6–2.4)
MAGNESIUM SERPL-MCNC: 2.1 MG/DL (ref 1.6–2.4)
MCH RBC QN AUTO: 27.8 PG (ref 26–34)
MCHC RBC AUTO-ENTMCNC: 31.9 G/DL (ref 30–36.5)
MCV RBC AUTO: 87.2 FL (ref 80–99)
MONOCYTES # BLD: 0.9 K/UL (ref 0–1)
MONOCYTES NFR BLD: 6 % (ref 5–13)
MUCOUS THREADS URNS QL MICRO: ABNORMAL /LPF
MYELOCYTES NFR BLD MANUAL: 2 %
NEUTS BAND NFR BLD MANUAL: 2 % (ref 0–6)
NEUTS SEG # BLD: 12 K/UL (ref 1.8–8)
NEUTS SEG NFR BLD: 78 % (ref 32–75)
NITRITE UR QL STRIP.AUTO: NEGATIVE
NRBC # BLD: 0 K/UL (ref 0–0.01)
NRBC BLD-RTO: 0 PER 100 WBC
OTHER URINE MICRO,5051: 1
OTHER,OTHU: ABNORMAL
PH UR STRIP: 5 [PH] (ref 5–8)
PLATELET # BLD AUTO: 824 K/UL (ref 150–400)
PLATELET COMMENTS,PCOM: ABNORMAL
PMV BLD AUTO: 9.7 FL (ref 8.9–12.9)
POTASSIUM SERPL-SCNC: 5.1 MMOL/L (ref 3.5–5.1)
PROT SERPL-MCNC: 6.9 G/DL (ref 6.4–8.2)
PROT UR STRIP-MCNC: 100 MG/DL
RBC # BLD AUTO: 3.27 M/UL (ref 3.8–5.2)
RBC #/AREA URNS HPF: ABNORMAL /HPF (ref 0–5)
RBC MORPH BLD: ABNORMAL
SARS-COV-2, COV2: NORMAL
SODIUM SERPL-SCNC: 124 MMOL/L (ref 136–145)
SP GR UR REFRACTOMETRY: 1.02 (ref 1–1.03)
SPECIMEN SOURCE: NORMAL
TROPONIN-HIGH SENSITIVITY: 33 NG/L (ref 0–51)
TROPONIN-HIGH SENSITIVITY: 41 NG/L (ref 0–51)
UA: UC IF INDICATED,UAUC: ABNORMAL
UROBILINOGEN UR QL STRIP.AUTO: 0.1 EU/DL (ref 0.1–1)
WBC # BLD AUTO: 15 K/UL (ref 3.6–11)
WBC URNS QL MICRO: ABNORMAL /HPF (ref 0–4)

## 2021-10-14 PROCEDURE — 74011000636 HC RX REV CODE- 636: Performed by: STUDENT IN AN ORGANIZED HEALTH CARE EDUCATION/TRAINING PROGRAM

## 2021-10-14 PROCEDURE — 83690 ASSAY OF LIPASE: CPT

## 2021-10-14 PROCEDURE — 74011250636 HC RX REV CODE- 250/636: Performed by: STUDENT IN AN ORGANIZED HEALTH CARE EDUCATION/TRAINING PROGRAM

## 2021-10-14 PROCEDURE — 81001 URINALYSIS AUTO W/SCOPE: CPT

## 2021-10-14 PROCEDURE — 87040 BLOOD CULTURE FOR BACTERIA: CPT

## 2021-10-14 PROCEDURE — 87086 URINE CULTURE/COLONY COUNT: CPT

## 2021-10-14 PROCEDURE — 70450 CT HEAD/BRAIN W/O DYE: CPT

## 2021-10-14 PROCEDURE — 96374 THER/PROPH/DIAG INJ IV PUSH: CPT

## 2021-10-14 PROCEDURE — 83880 ASSAY OF NATRIURETIC PEPTIDE: CPT

## 2021-10-14 PROCEDURE — 65270000029 HC RM PRIVATE

## 2021-10-14 PROCEDURE — 82550 ASSAY OF CK (CPK): CPT

## 2021-10-14 PROCEDURE — 85025 COMPLETE CBC W/AUTO DIFF WBC: CPT

## 2021-10-14 PROCEDURE — 74011250636 HC RX REV CODE- 250/636: Performed by: FAMILY MEDICINE

## 2021-10-14 PROCEDURE — 87186 SC STD MICRODIL/AGAR DIL: CPT

## 2021-10-14 PROCEDURE — 87635 SARS-COV-2 COVID-19 AMP PRB: CPT

## 2021-10-14 PROCEDURE — 36415 COLL VENOUS BLD VENIPUNCTURE: CPT

## 2021-10-14 PROCEDURE — 84484 ASSAY OF TROPONIN QUANT: CPT

## 2021-10-14 PROCEDURE — 74177 CT ABD & PELVIS W/CONTRAST: CPT

## 2021-10-14 PROCEDURE — 86141 C-REACTIVE PROTEIN HS: CPT

## 2021-10-14 PROCEDURE — 83605 ASSAY OF LACTIC ACID: CPT

## 2021-10-14 PROCEDURE — 99284 EMERGENCY DEPT VISIT MOD MDM: CPT

## 2021-10-14 PROCEDURE — 80053 COMPREHEN METABOLIC PANEL: CPT

## 2021-10-14 PROCEDURE — 87077 CULTURE AEROBIC IDENTIFY: CPT

## 2021-10-14 PROCEDURE — 74011000250 HC RX REV CODE- 250: Performed by: STUDENT IN AN ORGANIZED HEALTH CARE EDUCATION/TRAINING PROGRAM

## 2021-10-14 PROCEDURE — 83735 ASSAY OF MAGNESIUM: CPT

## 2021-10-14 PROCEDURE — 51702 INSERT TEMP BLADDER CATH: CPT

## 2021-10-14 PROCEDURE — 71045 X-RAY EXAM CHEST 1 VIEW: CPT

## 2021-10-14 RX ORDER — OXYCODONE AND ACETAMINOPHEN 10; 325 MG/1; MG/1
TABLET ORAL
COMMUNITY

## 2021-10-14 RX ORDER — ACETAMINOPHEN 325 MG/1
650 TABLET ORAL
Status: DISCONTINUED | OUTPATIENT
Start: 2021-10-14 | End: 2021-10-21 | Stop reason: HOSPADM

## 2021-10-14 RX ORDER — BACLOFEN 10 MG/1
5 TABLET ORAL 3 TIMES DAILY
COMMUNITY
End: 2021-10-21

## 2021-10-14 RX ORDER — ESCITALOPRAM OXALATE 20 MG/1
20 TABLET ORAL DAILY
COMMUNITY

## 2021-10-14 RX ORDER — LEVOTHYROXINE SODIUM 150 UG/1
TABLET ORAL
COMMUNITY

## 2021-10-14 RX ORDER — GABAPENTIN 800 MG/1
TABLET ORAL 3 TIMES DAILY
COMMUNITY
End: 2021-10-21

## 2021-10-14 RX ORDER — CYANOCOBALAMIN 1000 UG/ML
1000 INJECTION, SOLUTION INTRAMUSCULAR; SUBCUTANEOUS ONCE
COMMUNITY
End: 2021-10-21

## 2021-10-14 RX ORDER — HYDROXYZINE 50 MG/ML
25 INJECTION, SOLUTION INTRAMUSCULAR
Status: DISCONTINUED | OUTPATIENT
Start: 2021-10-14 | End: 2021-10-21 | Stop reason: HOSPADM

## 2021-10-14 RX ORDER — MEMANTINE HYDROCHLORIDE 10 MG/1
TABLET ORAL DAILY
COMMUNITY

## 2021-10-14 RX ORDER — MEMANTINE HYDROCHLORIDE 10 MG/1
10 TABLET ORAL 2 TIMES DAILY
Status: DISCONTINUED | OUTPATIENT
Start: 2021-10-14 | End: 2021-10-21 | Stop reason: HOSPADM

## 2021-10-14 RX ORDER — AMITRIPTYLINE HYDROCHLORIDE 25 MG/1
TABLET, FILM COATED ORAL
COMMUNITY

## 2021-10-14 RX ORDER — ALENDRONATE SODIUM 35 MG/1
TABLET ORAL
COMMUNITY
End: 2021-10-21

## 2021-10-14 RX ORDER — BISACODYL 5 MG
5 TABLET, DELAYED RELEASE (ENTERIC COATED) ORAL DAILY PRN
COMMUNITY
End: 2021-10-21

## 2021-10-14 RX ORDER — LISINOPRIL 5 MG/1
TABLET ORAL DAILY
COMMUNITY

## 2021-10-14 RX ORDER — PANTOPRAZOLE SODIUM 40 MG/1
40 TABLET, DELAYED RELEASE ORAL DAILY
Status: DISCONTINUED | OUTPATIENT
Start: 2021-10-15 | End: 2021-10-20

## 2021-10-14 RX ORDER — MORPHINE SULFATE 4 MG/ML
4 INJECTION INTRAVENOUS ONCE
Status: COMPLETED | OUTPATIENT
Start: 2021-10-14 | End: 2021-10-14

## 2021-10-14 RX ORDER — CARVEDILOL 6.25 MG/1
TABLET ORAL 2 TIMES DAILY WITH MEALS
COMMUNITY

## 2021-10-14 RX ORDER — METRONIDAZOLE 500 MG/100ML
500 INJECTION, SOLUTION INTRAVENOUS ONCE
Status: COMPLETED | OUTPATIENT
Start: 2021-10-14 | End: 2021-10-14

## 2021-10-14 RX ORDER — RIFAMPIN 300 MG/1
CAPSULE ORAL
COMMUNITY

## 2021-10-14 RX ORDER — CEVIMELINE HYDROCHLORIDE 30 MG/1
30 CAPSULE ORAL 3 TIMES DAILY
COMMUNITY

## 2021-10-14 RX ORDER — ESCITALOPRAM OXALATE 10 MG/1
20 TABLET ORAL DAILY
Status: DISCONTINUED | OUTPATIENT
Start: 2021-10-15 | End: 2021-10-21 | Stop reason: HOSPADM

## 2021-10-14 RX ORDER — ACETAMINOPHEN 650 MG/1
650 SUPPOSITORY RECTAL
Status: DISCONTINUED | OUTPATIENT
Start: 2021-10-14 | End: 2021-10-21 | Stop reason: HOSPADM

## 2021-10-14 RX ORDER — OMEPRAZOLE 20 MG/1
20 CAPSULE, DELAYED RELEASE ORAL DAILY
COMMUNITY
End: 2021-10-21

## 2021-10-14 RX ORDER — PREDNISONE 5 MG/1
TABLET ORAL
COMMUNITY

## 2021-10-14 RX ORDER — ONDANSETRON 4 MG/1
4 TABLET, ORALLY DISINTEGRATING ORAL
Status: DISCONTINUED | OUTPATIENT
Start: 2021-10-14 | End: 2021-10-21 | Stop reason: HOSPADM

## 2021-10-14 RX ORDER — DULOXETIN HYDROCHLORIDE 30 MG/1
60 CAPSULE, DELAYED RELEASE ORAL DAILY
Status: DISCONTINUED | OUTPATIENT
Start: 2021-10-15 | End: 2021-10-21 | Stop reason: HOSPADM

## 2021-10-14 RX ORDER — CARVEDILOL 3.12 MG/1
6.25 TABLET ORAL 2 TIMES DAILY WITH MEALS
Status: DISCONTINUED | OUTPATIENT
Start: 2021-10-15 | End: 2021-10-17 | Stop reason: SDUPTHER

## 2021-10-14 RX ORDER — DONEPEZIL HYDROCHLORIDE 5 MG/1
10 TABLET, FILM COATED ORAL
Status: DISCONTINUED | OUTPATIENT
Start: 2021-10-14 | End: 2021-10-21 | Stop reason: HOSPADM

## 2021-10-14 RX ORDER — SODIUM CHLORIDE 9 MG/ML
75 INJECTION, SOLUTION INTRAVENOUS CONTINUOUS
Status: DISCONTINUED | OUTPATIENT
Start: 2021-10-14 | End: 2021-10-20

## 2021-10-14 RX ORDER — PREDNISONE 5 MG/1
5 TABLET ORAL
Status: DISCONTINUED | OUTPATIENT
Start: 2021-10-15 | End: 2021-10-21 | Stop reason: HOSPADM

## 2021-10-14 RX ORDER — ONDANSETRON 2 MG/ML
4 INJECTION INTRAMUSCULAR; INTRAVENOUS
Status: DISCONTINUED | OUTPATIENT
Start: 2021-10-14 | End: 2021-10-21 | Stop reason: HOSPADM

## 2021-10-14 RX ORDER — MELOXICAM 15 MG/1
15 TABLET ORAL DAILY
COMMUNITY
End: 2021-10-21

## 2021-10-14 RX ORDER — POLYETHYLENE GLYCOL 3350 17 G/17G
17 POWDER, FOR SOLUTION ORAL DAILY PRN
Status: DISCONTINUED | OUTPATIENT
Start: 2021-10-14 | End: 2021-10-21 | Stop reason: HOSPADM

## 2021-10-14 RX ORDER — DONEPEZIL HYDROCHLORIDE 5 MG/1
TABLET, FILM COATED ORAL
COMMUNITY

## 2021-10-14 RX ORDER — DULOXETIN HYDROCHLORIDE 60 MG/1
60 CAPSULE, DELAYED RELEASE ORAL DAILY
COMMUNITY

## 2021-10-14 RX ORDER — HEPARIN SODIUM 5000 [USP'U]/ML
5000 INJECTION, SOLUTION INTRAVENOUS; SUBCUTANEOUS EVERY 8 HOURS
Status: DISCONTINUED | OUTPATIENT
Start: 2021-10-14 | End: 2021-10-21 | Stop reason: HOSPADM

## 2021-10-14 RX ORDER — LEVOFLOXACIN 5 MG/ML
500 INJECTION, SOLUTION INTRAVENOUS EVERY 24 HOURS
Status: DISCONTINUED | OUTPATIENT
Start: 2021-10-14 | End: 2021-10-15

## 2021-10-14 RX ORDER — HYDROMORPHONE HYDROCHLORIDE 1 MG/ML
1 INJECTION, SOLUTION INTRAMUSCULAR; INTRAVENOUS; SUBCUTANEOUS ONCE
Status: COMPLETED | OUTPATIENT
Start: 2021-10-14 | End: 2021-10-14

## 2021-10-14 RX ORDER — CYANOCOBALAMIN 1000 UG/ML
1000 INJECTION, SOLUTION INTRAMUSCULAR; SUBCUTANEOUS ONCE
Status: ACTIVE | OUTPATIENT
Start: 2021-10-14 | End: 2021-10-15

## 2021-10-14 RX ORDER — CEVIMELINE HYDROCHLORIDE 30 MG/1
30 CAPSULE ORAL 3 TIMES DAILY
Status: DISCONTINUED | OUTPATIENT
Start: 2021-10-14 | End: 2021-10-21 | Stop reason: HOSPADM

## 2021-10-14 RX ORDER — LEVOTHYROXINE SODIUM 75 UG/1
150 TABLET ORAL
Status: DISCONTINUED | OUTPATIENT
Start: 2021-10-15 | End: 2021-10-20

## 2021-10-14 RX ORDER — ALBUTEROL SULFATE 90 UG/1
2 AEROSOL, METERED RESPIRATORY (INHALATION)
Status: DISCONTINUED | OUTPATIENT
Start: 2021-10-14 | End: 2021-10-21 | Stop reason: HOSPADM

## 2021-10-14 RX ORDER — ALBUTEROL SULFATE 90 UG/1
AEROSOL, METERED RESPIRATORY (INHALATION)
COMMUNITY

## 2021-10-14 RX ORDER — LORAZEPAM 0.5 MG/1
TABLET ORAL
COMMUNITY
End: 2021-10-21

## 2021-10-14 RX ORDER — LISINOPRIL 5 MG/1
5 TABLET ORAL DAILY
Status: DISCONTINUED | OUTPATIENT
Start: 2021-10-15 | End: 2021-10-17 | Stop reason: SDUPTHER

## 2021-10-14 RX ORDER — CETIRIZINE HCL 10 MG
TABLET ORAL
COMMUNITY
End: 2021-10-21

## 2021-10-14 RX ORDER — RIFAMPIN 300 MG/1
300 CAPSULE ORAL
Status: DISCONTINUED | OUTPATIENT
Start: 2021-10-15 | End: 2021-10-21 | Stop reason: HOSPADM

## 2021-10-14 RX ADMIN — METRONIDAZOLE 500 MG: 500 INJECTION, SOLUTION INTRAVENOUS at 20:02

## 2021-10-14 RX ADMIN — HEPARIN SODIUM 5000 UNITS: 5000 INJECTION INTRAVENOUS; SUBCUTANEOUS at 22:19

## 2021-10-14 RX ADMIN — MORPHINE SULFATE 4 MG: 4 INJECTION, SOLUTION INTRAMUSCULAR; INTRAVENOUS at 22:18

## 2021-10-14 RX ADMIN — VANCOMYCIN HYDROCHLORIDE 1000 MG: 1 INJECTION, POWDER, LYOPHILIZED, FOR SOLUTION INTRAVENOUS at 22:21

## 2021-10-14 RX ADMIN — CEFEPIME HYDROCHLORIDE 1 G: 1 INJECTION, POWDER, FOR SOLUTION INTRAMUSCULAR; INTRAVENOUS at 20:02

## 2021-10-14 RX ADMIN — MORPHINE SULFATE 4 MG: 4 INJECTION, SOLUTION INTRAMUSCULAR; INTRAVENOUS at 17:16

## 2021-10-14 RX ADMIN — LEVOFLOXACIN 500 MG: 5 INJECTION, SOLUTION INTRAVENOUS at 21:06

## 2021-10-14 RX ADMIN — HYDROMORPHONE HYDROCHLORIDE 1 MG: 1 INJECTION, SOLUTION INTRAMUSCULAR; INTRAVENOUS; SUBCUTANEOUS at 23:10

## 2021-10-14 RX ADMIN — SODIUM CHLORIDE 75 ML/HR: 9 INJECTION, SOLUTION INTRAVENOUS at 21:07

## 2021-10-14 RX ADMIN — IOPAMIDOL 100 ML: 755 INJECTION, SOLUTION INTRAVENOUS at 19:11

## 2021-10-14 NOTE — ED PROVIDER NOTES
EMERGENCY DEPARTMENT HISTORY AND PHYSICAL EXAM      Date: 10/14/2021  Patient Name: Esthela Curtis    History of Presenting Illness     Chief Complaint   Patient presents with    Altered mental status       HPI: Esthela Curtis, 73 y.o. female with a history of CAD status post CABG and a recent spinal surgery at T11 presenting today for altered mental status and pain. Patient is confused at baseline but has been more confused for the past 3 days. She had a T11 vertebral surgery on 8/27 at an outside facility that was complicated by infection status post washout on 9/7. She has been in rehab on antibiotics currently. For the past 3 days she has been more confused. She was sent here from rehab for elevated white count. The patient does not provide any history and says \"I hurt all over\". She reports that the pain is most severe in the abdomen but also reports back pain. No other history was obtained from the patient due to AMS. Patient had her surgeries in [facility] by Dr. Patricia Sharma. PCP: Gena Meléndez MD    Current Facility-Administered Medications   Medication Dose Route Frequency Provider Last Rate Last Admin    vancomycin (VANCOCIN) 1,000 mg in 0.9% sodium chloride 250 mL (VIAL-MATE)  1,000 mg IntraVENous ONCE Benitez Thomas MD        cefepime (MAXIPIME) 1 g in sterile water (preservative free) 10 mL IV syringe  1 g IntraVENous ONCE Benitez Thomas MD        metroNIDAZOLE (FLAGYL) IVPB premix 500 mg  500 mg IntraVENous Benitez Jiménez MD         Current Outpatient Medications   Medication Sig Dispense Refill    alendronate (FOSAMAX) 35 mg tablet Take  by mouth every seven (7) days.  amitriptyline (ELAVIL) 25 mg tablet Take  by mouth nightly.  LORazepam (Ativan) 0.5 mg tablet Take  by mouth.  baclofen (LIORESAL) 10 mg tablet Take 5 mg by mouth three (3) times daily.  carvediloL (COREG) 6.25 mg tablet Take  by mouth two (2) times daily (with meals).       cetirizine (ZYRTEC) 10 mg tablet Take  by mouth.  cevimeline (EVOXAC) 30 mg capsule Take 30 mg by mouth three (3) times daily.  cyanocobalamin (VITAMIN B12) 1,000 mcg/mL injection 1,000 mcg by IntraMUSCular route once.  donepeziL (ARICEPT) 5 mg tablet Take  by mouth nightly.  bisacodyL (Dulcolax, bisacodyl,) 5 mg EC tablet Take 5 mg by mouth daily as needed for Constipation.  DULoxetine (CYMBALTA) 60 mg capsule Take 60 mg by mouth daily.  escitalopram oxalate (LEXAPRO) 20 mg tablet Take 20 mg by mouth daily.  gabapentin (NEURONTIN) 800 mg tablet Take  by mouth three (3) times daily.  levothyroxine (SYNTHROID) 150 mcg tablet Take  by mouth Daily (before breakfast).  lisinopriL (PRINIVIL, ZESTRIL) 5 mg tablet Take  by mouth daily.  meloxicam (MOBIC) 15 mg tablet Take 15 mg by mouth daily.  memantine (Namenda) 10 mg tablet Take  by mouth daily.  omeprazole (PRILOSEC) 20 mg capsule Take 20 mg by mouth daily.  oxyCODONE-acetaminophen (PERCOCET 10)  mg per tablet Take  by mouth.  predniSONE (DELTASONE) 5 mg tablet Take  by mouth.  albuterol (Proventil HFA) 90 mcg/actuation inhaler Take  by inhalation.  rifAMPin (RIFADIN) 300 mg capsule Take  by mouth Before breakfast and dinner.  sennosides 8.6 mg cap Take  by mouth. Medical History   I reviewed the medical, surgical, family, and social history, as well as allergies:    Past Medical History:  Past Medical History:   Diagnosis Date    Dorsalgia     Intervertebral disc disease     Spinal stenosis        Past Surgical History:  No past surgical history on file. Family History:  No family history on file. Social History:  Social History     Tobacco Use    Smoking status: Not on file   Substance Use Topics    Alcohol use: Not on file    Drug use: Not on file       Allergies:   Allergies   Allergen Reactions    [place] Unknown (comments)    Amoxicillin Unknown (comments)    Buckwheat Unknown (comments)    Cayenne Unknown (comments)    Cefaclor Unknown (comments)    Cheese Unknown (comments)    Clarithromycin Unknown (comments)    Egg Unknown (comments)    Erythromycin Unknown (comments)    Fish Containing Products Other (comments)    Gluten Unknown (comments)    Lactose Unknown (comments)    Oats Unknown (comments)    Penicillins Unknown (comments)    Safflower Oil Unknown (comments)    Sesame Seed Unknown (comments)    Sulfa (Sulfonamide Antibiotics) Unknown (comments)    Sulfamethoxazole-Trimethoprim Unknown (comments)    Tetracycline Unknown (comments)    Wheat Unknown (comments)       Review of Systems     Review of Systems   Unable to perform ROS: Mental status change         Physical Exam and Vital Signs   Vital Signs - Reviewed the patient's vital signs. Patient Vitals for the past 12 hrs:   Temp Pulse Resp BP SpO2   10/14/21 1425 98.3 °F (36.8 °C) 78 18 (!) 184/80 95 %       Physical Exam:    GENERAL: awake, alert, confused, ANO x1, tracks around the room, moaning in pain  HEENT:  * Pupils equal, EOMI  * Head atraumatic  CV:  * regular rhythm  * warm and perfused extremities bilaterally  PULMONARY: Good air movement, no wheezes or crackles  ABDOMEN: He was tenderness  : No suprapubic tenderness  BACK: Vertebral surgery site does not appear to have any cellulitis or abscess without any purulence  EXTREMITIES/BACK: warm and perfused, no tenderness, no edema  SKIN: no rashes or signs of trauma  NEURO:  * Speech clear but is confused and ANO x1  * Moves U&LE to command      Medical Decision Making and ED Course   - I am the first and primary provider for this patient and am the primary provider of record. - I reviewed the vital signs, available nursing notes, past medical history, past surgical history, family history and social history. - Initial assessment performed.  The patients presenting problems have been discussed, and the staff are in agreement with the care plan formulated and outlined with them. I have encouraged them to ask questions as they arise throughout their visit. - Available medical records, nursing notes, old EKGs, and EMS run sheets (if patient was EMS transported) were reviewed    MDM:   Patient is a 73 y.o. female presenting for pain and confusion after recent spinal surgery and infection currently on antibiotics. Vitals reveal related blood pressure and physical exam reveals tenderness over the abdomen. Based on the history, physical exam, risk factors, and vitals signs, differential includes: Intra-abdominal infection, sepsis, spinal epidural abscess, ICH, electrode disturbances, metabolic abnormalities. Results     Labs:     Recent Results (from the past 12 hour(s))   CBC WITH AUTOMATED DIFF    Collection Time: 10/14/21  5:00 PM   Result Value Ref Range    WBC 15.0 (H) 3.6 - 11.0 K/uL    RBC 3.27 (L) 3.80 - 5.20 M/uL    HGB 9.1 (L) 11.5 - 16.0 g/dL    HCT 28.5 (L) 35.0 - 47.0 %    MCV 87.2 80.0 - 99.0 FL    MCH 27.8 26.0 - 34.0 PG    MCHC 31.9 30.0 - 36.5 g/dL    RDW 15.9 (H) 11.5 - 14.5 %    PLATELET 345 (H) 535 - 400 K/uL    MPV 9.7 8.9 - 12.9 FL    NRBC 0.0 0.0  WBC    ABSOLUTE NRBC 0.00 0.00 - 0.01 K/uL    NEUTROPHILS 78 (H) 32 - 75 %    BAND NEUTROPHILS 2 0 - 6 %    LYMPHOCYTES 10 (L) 12 - 49 %    MONOCYTES 6 5 - 13 %    EOSINOPHILS 2 0 - 7 %    BASOPHILS 0 0 - 1 %    MYELOCYTES 2 (H) 0 %    IMMATURE GRANULOCYTES 0 %    ABS. NEUTROPHILS 12.0 (H) 1.8 - 8.0 K/UL    ABS. LYMPHOCYTES 1.5 0.8 - 3.5 K/UL    ABS. MONOCYTES 0.9 0.0 - 1.0 K/UL    ABS. EOSINOPHILS 0.3 0.0 - 0.4 K/UL    ABS. BASOPHILS 0.0 0.0 - 0.1 K/UL    ABS. IMM.  GRANS. 0.0 K/UL    PLATELET COMMENTS Large Platelets      RBC COMMENTS Anisocytosis  1+        DF Manual     METABOLIC PANEL, COMPREHENSIVE    Collection Time: 10/14/21  5:00 PM   Result Value Ref Range    Sodium 124 (L) 136 - 145 mmol/L    Potassium 5.1 3.5 - 5.1 mmol/L    Chloride 89 (L) 97 - 108 mmol/L CO2 27 21 - 32 mmol/L    Anion gap 8 5 - 15 mmol/L    Glucose 77 65 - 100 mg/dL    BUN 11 6 - 20 mg/dL    Creatinine 0.52 (L) 0.55 - 1.02 mg/dL    BUN/Creatinine ratio 21 (H) 12 - 20      GFR est AA >60 >60 ml/min/1.73m2    GFR est non-AA >60 >60 ml/min/1.73m2    Calcium 8.6 8.5 - 10.1 mg/dL    Bilirubin, total 0.4 0.2 - 1.0 mg/dL    AST (SGOT) 25 15 - 37 U/L    ALT (SGPT) 13 12 - 78 U/L    Alk.  phosphatase 124 (H) 45 - 117 U/L    Protein, total 6.9 6.4 - 8.2 g/dL    Albumin 2.0 (L) 3.5 - 5.0 g/dL    Globulin 4.9 (H) 2.0 - 4.0 g/dL    A-G Ratio 0.4 (L) 1.1 - 2.2     MAGNESIUM    Collection Time: 10/14/21  5:00 PM   Result Value Ref Range    Magnesium 2.0 1.6 - 2.4 mg/dL   LACTIC ACID    Collection Time: 10/14/21  5:00 PM   Result Value Ref Range    Lactic acid 0.7 0.4 - 2.0 mmol/L   TROPONIN-HIGH SENSITIVITY    Collection Time: 10/14/21  5:00 PM   Result Value Ref Range    Troponin-High Sensitivity 33 0 - 51 ng/L   URINALYSIS W/ REFLEX CULTURE    Collection Time: 10/14/21  5:00 PM    Specimen: Urine   Result Value Ref Range    Color Amy      Appearance Turbid (A) Clear      Specific gravity 1.024 1.003 - 1.030      pH (UA) 5.0 5.0 - 8.0      Protein 100 (A) Negative mg/dL    Glucose Negative Negative mg/dL    Ketone 5 (A) Negative mg/dL    Bilirubin Negative Negative      Blood Moderate (A) Negative      Urobilinogen 0.1 0.1 - 1.0 EU/dL    Nitrites Negative Negative      Leukocyte Esterase Moderate (A) Negative      UA:UC IF INDICATED Urine Culture Ordered (A) Culture not indicated by UA result      WBC  0 - 4 /hpf    RBC  0 - 5 /hpf    Bacteria 4+ (A) Negative /hpf    Mucus 4+ /lpf    Other Urine Micro 1      Other: Renal Epithelial cells present     CK    Collection Time: 10/14/21  5:00 PM   Result Value Ref Range    CK 89 26 - 192 U/L   MAGNESIUM    Collection Time: 10/14/21  5:00 PM   Result Value Ref Range    Magnesium 2.1 1.6 - 2.4 mg/dL   LIPASE    Collection Time: 10/14/21  5:00 PM   Result Value Ref Range    Lipase 70 (L) 73 - 393 U/L   SARS-COV-2    Collection Time: 10/14/21  6:42 PM   Result Value Ref Range    SARS-CoV-2 Please find results under separate order         Radiologic Studies:  CT Results  (Last 48 hours)               10/14/21 1911  CT ABD PELV W CONT Final result    Impression:  No acute findings. Constipation       Narrative:  CT dose reduction was achieved through use of a standardized protocol tailored   for this examination and automatic exposure control for dose modulation. Contrast study shows clear lung bases       Liver, spleen, pancreas are normal. Gallbladder is out with mild subsequent   biliary dilatation. Adrenals and right kidney are normal. Focal cortical scar   upper pole of left kidney. No small bowel or vascular abnormality,   lymphadenopathy or free fluid. Gastric staples       Uterus is out. No mass or free fluid. Catheterize bladder is empty. The entire   colon is mildly distended with mostly stool, and some gas and fluid. No   mesenteric fat edema or abdominal wall hernia. Advanced degenerative changes in the postoperative spine           10/14/21 1616  CT HEAD WO CONT Final result    Impression:  No acute intracranial abnormality. Narrative:  Study: Head CT without contrast.       Clinical Indication: Altered mental status. Comparison: None available. Technique: Routine volume acquisition of the head was performed without contrast   using soft tissue and bone kernels. Coronal and sagittal reconstructions were   generated and reviewed. Dose reduction: All CT scans at this facility are   performed using dose reduction optimization techniques as appropriate to a   performed exam including the following-automated exposure control, adjustments   of mA and/or Kv according to patient size, or use of iterative reconstructive   technique. Findings:       The ventricles are normal in size and configuration. No midline shift.  The basal cisterns are patent. No acute hemorrhage, abnormal   extra-axial fluid, or evidence of large territorial ischemia. Absent native lenses. The included paranasal sinuses and mastoid air cells are   clear. No evidence of an aggressive calvarial or extracalvarial lesion. CXR Results  (Last 48 hours)               10/14/21 1627  XR CHEST PORT Final result    Impression:  1. Possible lingular airspace opacity could represent pneumonia or atelectasis. 2. Mild interstitial pulmonary edema. Narrative:  Exam: XR CHEST PORT         Indication:  Altered mental status; Comparison: None       Findings:       Cardiomegaly mediastinal silhouette is mildly enlarged given the projection. Thickening of the pulmonary interstitium with mild cephalization of the   pulmonary vessels. Suggestion of a lingular airspace opacity. No pneumothorax. Cervical and thoracic lumbar fusion hardware surgical clips are seen within the   left upper quadrant. Right upper extremity PICC line is noted terminating at the   SVC. No pleural effusion. No pneumothorax. Medications ordered:  Medications   vancomycin (VANCOCIN) 1,000 mg in 0.9% sodium chloride 250 mL (VIAL-MATE) (has no administration in time range)   cefepime (MAXIPIME) 1 g in sterile water (preservative free) 10 mL IV syringe (has no administration in time range)   metroNIDAZOLE (FLAGYL) IVPB premix 500 mg (has no administration in time range)   morphine injection 4 mg (4 mg IntraVENous Given 10/14/21 1716)   iopamidoL (ISOVUE-370) 76 % injection 100 mL (100 mL IntraVENous Given 10/14/21 1911)        ED Course     ED Course:     ED Course as of Oct 14 2000   Thu Oct 14, 2021   1817 CBC shows a white count of 15. Urinalysis shows moderate leukocyte esterase with 4+ bacteria. Will treat for UTI.    [SS]   1901 Patient was given Vanco, cefepime, and Flagyl to cover for UTI and intra-abdominal infection.   Troponin is 33, will repeat. Lactate is within normal limits. No concern for severe sepsis, septic shock, or ischemia. Lipase is not significantly elevated. CPK does not reflect any significant rhabdomyolysis. Magnesium within normal limits. CMP shows a sodium of 124 with no other abnormalities. No evidence of hepatitis. [SS]      ED Course User Index  [SS] Yissel Olivier MD       Reassessment / Disposition / Discussion:    CT did not show any intra-abdominal pathology or any evidence of infection on the T-spine overlying the surgery. Patient has a UTI and will be treated for urine infection likely causing her symptoms. Her white count is elevated and the patient has been on antibiotics which might affect sirs criteria. She also has hyponatremia with unknown baseline. Will admit for further management. Final Disposition     Disposition: Condition stable  Admitted to Floor Medical Floor the case was discussed with the admitting physician Dr. Santana Ramirez. ADMISSION: After completion of ED workup and discussion of results and diagnoses with the patient, patient was admitted to the hospital. All the patient's questions were answered. Case was discussed with the receiving team.      Diagnosis     Clinical Impression:   1. Hyponatremia    2. Urinary tract infection without hematuria, site unspecified    3. Altered mental status, unspecified altered mental status type        Attestations:    Erika Guevara MD    Please note that this dictation was completed with Directa Plus, the computer voice recognition software. Quite often unanticipated grammatical, syntax, homophones, and other interpretive errors are inadvertently transcribed by the computer software. Please disregard these errors. Please excuse any errors that have escaped final proofreading. Thank you.

## 2021-10-14 NOTE — ED TRIAGE NOTES
Pt arrives by EMS from [facility] for elevated WBC. Pt had recent spinal surgery 3w ago. Wound has stitches and she won't let anyone touch it. GCS 12.

## 2021-10-14 NOTE — Clinical Note
Status[de-identified] INPATIENT [101]   Type of Bed: Medical [8]   Inpatient Hospitalization Certified Necessary for the Following Reasons: 3.  Patient receiving treatment that can only be provided in an inpatient setting (further clarification in H&P documentation)   Admitting Diagnosis: UTI (urinary tract infection) [[id number]]   Admitting Physician: Carmen Montgomery [[id number]]   Attending Physician: Carmen Montgomery [[id number]]   Estimated Length of Stay: 2 Midnights   Discharge Plan[de-identified] Extended Care Facility (e.g. Adult Home, Nursing Home, etc.)

## 2021-10-15 LAB
ALBUMIN SERPL-MCNC: 2 G/DL (ref 3.5–5)
ALBUMIN/GLOB SERPL: 0.4 {RATIO} (ref 1.1–2.2)
ALP SERPL-CCNC: 117 U/L (ref 45–117)
ALT SERPL-CCNC: 11 U/L (ref 12–78)
ANION GAP SERPL CALC-SCNC: 9 MMOL/L (ref 5–15)
AST SERPL W P-5'-P-CCNC: 15 U/L (ref 15–37)
BASOPHILS # BLD: 0.1 K/UL (ref 0–0.1)
BASOPHILS NFR BLD: 1 % (ref 0–1)
BILIRUB SERPL-MCNC: 0.3 MG/DL (ref 0.2–1)
BNP SERPL-MCNC: 3430 PG/ML
BUN SERPL-MCNC: 9 MG/DL (ref 6–20)
BUN/CREAT SERPL: 18 (ref 12–20)
CA-I BLD-MCNC: 8.5 MG/DL (ref 8.5–10.1)
CHLORIDE SERPL-SCNC: 92 MMOL/L (ref 97–108)
CO2 SERPL-SCNC: 26 MMOL/L (ref 21–32)
CREAT SERPL-MCNC: 0.49 MG/DL (ref 0.55–1.02)
CRP SERPL HS-MCNC: >9.5 MG/L
DIFFERENTIAL METHOD BLD: ABNORMAL
EOSINOPHIL # BLD: 0.4 K/UL (ref 0–0.4)
EOSINOPHIL NFR BLD: 3 % (ref 0–7)
ERYTHROCYTE [DISTWIDTH] IN BLOOD BY AUTOMATED COUNT: 15.8 % (ref 11.5–14.5)
ERYTHROCYTE [SEDIMENTATION RATE] IN BLOOD: 103 MM/HR
GLOBULIN SER CALC-MCNC: 4.5 G/DL (ref 2–4)
GLUCOSE SERPL-MCNC: 91 MG/DL (ref 65–100)
HCT VFR BLD AUTO: 28.4 % (ref 35–47)
HGB BLD-MCNC: 9 G/DL (ref 11.5–16)
IMM GRANULOCYTES # BLD AUTO: 0.8 K/UL (ref 0–0.04)
IMM GRANULOCYTES NFR BLD AUTO: 6 % (ref 0–0.5)
LYMPHOCYTES # BLD: 1.6 K/UL (ref 0.8–3.5)
LYMPHOCYTES NFR BLD: 11 % (ref 12–49)
MCH RBC QN AUTO: 27.5 PG (ref 26–34)
MCHC RBC AUTO-ENTMCNC: 31.7 G/DL (ref 30–36.5)
MCV RBC AUTO: 86.9 FL (ref 80–99)
MONOCYTES # BLD: 0.9 K/UL (ref 0–1)
MONOCYTES NFR BLD: 6 % (ref 5–13)
NEUTS SEG # BLD: 10.4 K/UL (ref 1.8–8)
NEUTS SEG NFR BLD: 73 % (ref 32–75)
NRBC # BLD: 0 K/UL (ref 0–0.01)
NRBC BLD-RTO: 0 PER 100 WBC
PLATELET # BLD AUTO: 723 K/UL (ref 150–400)
PMV BLD AUTO: 8.8 FL (ref 8.9–12.9)
POTASSIUM SERPL-SCNC: 4.6 MMOL/L (ref 3.5–5.1)
PROT SERPL-MCNC: 6.5 G/DL (ref 6.4–8.2)
RBC # BLD AUTO: 3.27 M/UL (ref 3.8–5.2)
SODIUM SERPL-SCNC: 127 MMOL/L (ref 136–145)
WBC # BLD AUTO: 14.3 K/UL (ref 3.6–11)

## 2021-10-15 PROCEDURE — 65270000032 HC RM SEMIPRIVATE

## 2021-10-15 PROCEDURE — 80053 COMPREHEN METABOLIC PANEL: CPT

## 2021-10-15 PROCEDURE — 99223 1ST HOSP IP/OBS HIGH 75: CPT | Performed by: INTERNAL MEDICINE

## 2021-10-15 PROCEDURE — 36415 COLL VENOUS BLD VENIPUNCTURE: CPT

## 2021-10-15 PROCEDURE — 74011250637 HC RX REV CODE- 250/637: Performed by: FAMILY MEDICINE

## 2021-10-15 PROCEDURE — 74011000250 HC RX REV CODE- 250: Performed by: INTERNAL MEDICINE

## 2021-10-15 PROCEDURE — 85652 RBC SED RATE AUTOMATED: CPT

## 2021-10-15 PROCEDURE — 74011250636 HC RX REV CODE- 250/636: Performed by: INTERNAL MEDICINE

## 2021-10-15 PROCEDURE — 85025 COMPLETE CBC W/AUTO DIFF WBC: CPT

## 2021-10-15 PROCEDURE — 74011250636 HC RX REV CODE- 250/636: Performed by: FAMILY MEDICINE

## 2021-10-15 PROCEDURE — 74011636637 HC RX REV CODE- 636/637: Performed by: FAMILY MEDICINE

## 2021-10-15 RX ADMIN — PREDNISONE 5 MG: 5 TABLET ORAL at 09:45

## 2021-10-15 RX ADMIN — CARVEDILOL 6.25 MG: 3.12 TABLET, FILM COATED ORAL at 09:45

## 2021-10-15 RX ADMIN — MEMANTINE HYDROCHLORIDE 10 MG: 10 TABLET ORAL at 09:00

## 2021-10-15 RX ADMIN — HEPARIN SODIUM 5000 UNITS: 5000 INJECTION INTRAVENOUS; SUBCUTANEOUS at 22:42

## 2021-10-15 RX ADMIN — HYDROXYZINE HYDROCHLORIDE 25 MG: 50 INJECTION, SOLUTION INTRAMUSCULAR at 22:42

## 2021-10-15 RX ADMIN — VANCOMYCIN HYDROCHLORIDE 1000 MG: 1 INJECTION, POWDER, LYOPHILIZED, FOR SOLUTION INTRAVENOUS at 22:43

## 2021-10-15 RX ADMIN — RIFAMPIN 300 MG: 300 CAPSULE ORAL at 18:31

## 2021-10-15 RX ADMIN — CARVEDILOL 6.25 MG: 3.12 TABLET, FILM COATED ORAL at 17:00

## 2021-10-15 RX ADMIN — LEVOTHYROXINE SODIUM 150 MCG: 0.07 TABLET ORAL at 09:45

## 2021-10-15 RX ADMIN — HYDROXYZINE HYDROCHLORIDE 25 MG: 50 INJECTION, SOLUTION INTRAMUSCULAR at 10:06

## 2021-10-15 RX ADMIN — VANCOMYCIN HYDROCHLORIDE 1000 MG: 1 INJECTION, POWDER, LYOPHILIZED, FOR SOLUTION INTRAVENOUS at 10:19

## 2021-10-15 RX ADMIN — DONEPEZIL HYDROCHLORIDE 10 MG: 5 TABLET, FILM COATED ORAL at 22:42

## 2021-10-15 RX ADMIN — MEROPENEM 1 G: 1 INJECTION, POWDER, FOR SOLUTION INTRAVENOUS at 17:00

## 2021-10-15 RX ADMIN — CEVIMELINE 30 MG: 30 CAPSULE ORAL at 16:00

## 2021-10-15 RX ADMIN — CEVIMELINE 30 MG: 30 CAPSULE ORAL at 22:00

## 2021-10-15 RX ADMIN — RIFAMPIN 300 MG: 300 CAPSULE ORAL at 09:45

## 2021-10-15 RX ADMIN — DULOXETINE 60 MG: 30 CAPSULE, DELAYED RELEASE ORAL at 09:45

## 2021-10-15 RX ADMIN — MEMANTINE HYDROCHLORIDE 10 MG: 10 TABLET ORAL at 22:42

## 2021-10-15 RX ADMIN — LISINOPRIL 5 MG: 5 TABLET ORAL at 09:45

## 2021-10-15 RX ADMIN — PANTOPRAZOLE SODIUM 40 MG: 40 TABLET, DELAYED RELEASE ORAL at 09:45

## 2021-10-15 RX ADMIN — HEPARIN SODIUM 5000 UNITS: 5000 INJECTION INTRAVENOUS; SUBCUTANEOUS at 05:37

## 2021-10-15 RX ADMIN — HEPARIN SODIUM 5000 UNITS: 5000 INJECTION INTRAVENOUS; SUBCUTANEOUS at 18:29

## 2021-10-15 RX ADMIN — ACETAMINOPHEN 650 MG: 325 TABLET ORAL at 22:41

## 2021-10-15 RX ADMIN — ESCITALOPRAM OXALATE 20 MG: 10 TABLET ORAL at 09:45

## 2021-10-15 NOTE — CONSULTS
ORTHOPEDIC CONSULT    Patient: Ninfa Rosas MRN: [id number]  SSN: [id number]    YOB: 1950  Age: 73 y.o. Sex: female      Subjective: Ninfa Rosas is a 73 y.o. female. Orthopedics was asked to see this patient for surgical site wound. The patient was seen with her  present at bedside provided majority information. The patient is status post thoracic/lumbar surgery on August 27 in [facility] performed by Dr. Elinor Irving neurosurgeon. The  states that she was discharged to a rehab facility but was readmitted to the hospital on September 7 after discovering that she had a surgical site infection. He states since that time she has been followed closely by Dr. Elinor Irving and was started on antibiotic therapy by an infectious disease doctor. She has been admitted to the hospital now because of altered mental status and UTI. The patient still complains of pain of her lumbar spine region.  states she has had very limited ambulation. Past Medical History:   Diagnosis Date    Dorsalgia     Intervertebral disc disease     Spinal stenosis      No past surgical history on file. No family history on file. Social History     Tobacco Use    Smoking status: Not on file   Substance Use Topics    Alcohol use: Not on file      Prior to Admission medications    Medication Sig Start Date End Date Taking? Authorizing Provider   alendronate (FOSAMAX) 35 mg tablet Take  by mouth every seven (7) days. Yes Other, MD Chaim   amitriptyline (ELAVIL) 25 mg tablet Take  by mouth nightly. Yes Other, MD Chaim   LORazepam (Ativan) 0.5 mg tablet Take  by mouth. Yes Other, MD Chaim   baclofen (LIORESAL) 10 mg tablet Take 5 mg by mouth three (3) times daily. Yes Other, MD Chaim   carvediloL (COREG) 6.25 mg tablet Take  by mouth two (2) times daily (with meals). Yes Other, MD Chaim   cetirizine (ZYRTEC) 10 mg tablet Take  by mouth.    Yes Other, MD Chaim   cevimeline (EVOXAC) 30 mg capsule Take 30 mg by mouth three (3) times daily. Yes Other, MD Chaim   cyanocobalamin (VITAMIN B12) 1,000 mcg/mL injection 1,000 mcg by IntraMUSCular route once. Yes Other, MD Chaim   donepeziL (ARICEPT) 5 mg tablet Take  by mouth nightly. Yes Other, MD Chaim   bisacodyL (Dulcolax, bisacodyl,) 5 mg EC tablet Take 5 mg by mouth daily as needed for Constipation. Yes Other, MD Chaim   DULoxetine (CYMBALTA) 60 mg capsule Take 60 mg by mouth daily. Yes Other, MD Chaim   escitalopram oxalate (LEXAPRO) 20 mg tablet Take 20 mg by mouth daily. Yes Other, MD Chaim   gabapentin (NEURONTIN) 800 mg tablet Take  by mouth three (3) times daily. Yes Other, MD Chaim   levothyroxine (SYNTHROID) 150 mcg tablet Take  by mouth Daily (before breakfast). Yes Other, MD Chaim   lisinopriL (PRINIVIL, ZESTRIL) 5 mg tablet Take  by mouth daily. Yes Other, MD Chaim   meloxicam (MOBIC) 15 mg tablet Take 15 mg by mouth daily. Yes Other, MD Chaim   memantine (Namenda) 10 mg tablet Take  by mouth daily. Yes Other, MD Chaim   omeprazole (PRILOSEC) 20 mg capsule Take 20 mg by mouth daily. Yes Other, MD Chaim   oxyCODONE-acetaminophen (PERCOCET 10)  mg per tablet Take  by mouth. Yes Other, MD Chaim   predniSONE (DELTASONE) 5 mg tablet Take  by mouth. Yes Other, MD Chaim   albuterol (Proventil HFA) 90 mcg/actuation inhaler Take  by inhalation. Yes Other, MD Chaim   rifAMPin (RIFADIN) 300 mg capsule Take  by mouth Before breakfast and dinner. Yes Other, MD Chaim   sennosides 8.6 mg cap Take  by mouth.    Yes Other, MD Chaim       Allergies   Allergen Reactions    [place] Unknown (comments)    Amoxicillin Unknown (comments)    Buckwheat Unknown (comments)    Cayenne Unknown (comments)    Cefaclor Unknown (comments)    Cheese Unknown (comments)    Clarithromycin Unknown (comments)    Egg Unknown (comments)    Erythromycin Unknown (comments)    Fish Containing Products Other (comments)    Gluten Unknown (comments)    Lactose Unknown (comments)    Oats Unknown (comments)    Penicillins Unknown (comments)    Safflower Oil Unknown (comments)    Sesame Seed Unknown (comments)    Sulfa (Sulfonamide Antibiotics) Unknown (comments)    Sulfamethoxazole-Trimethoprim Unknown (comments)    Tetracycline Unknown (comments)    Wheat Unknown (comments)       Review of Systems:  ROS      Objective:     Current Facility-Administered Medications   Medication Dose Route Frequency    vancomycin (VANCOCIN) 1,000 mg in 0.9% sodium chloride 250 mL (VIAL-MATE)  1,000 mg IntraVENous Q12H    [START ON 10/16/2021] Vancomycin Trough Level 10-16-21 at 8 am   Other ONCE    albuterol (PROVENTIL HFA, VENTOLIN HFA, PROAIR HFA) inhaler 2 Puff  2 Puff Inhalation Q6H PRN    carvediloL (COREG) tablet 6.25 mg  6.25 mg Oral BID WITH MEALS    cevimeline (EVOXAC) capsule 30 mg  30 mg Oral TID    donepeziL (ARICEPT) tablet 10 mg  10 mg Oral QHS    DULoxetine (CYMBALTA) capsule 60 mg  60 mg Oral DAILY    escitalopram oxalate (LEXAPRO) tablet 20 mg  20 mg Oral DAILY    levothyroxine (SYNTHROID) tablet 150 mcg  150 mcg Oral ACB    lisinopriL (PRINIVIL, ZESTRIL) tablet 5 mg  5 mg Oral DAILY    memantine (NAMENDA) tablet 10 mg  10 mg Oral BID    pantoprazole (PROTONIX) tablet 40 mg  40 mg Oral DAILY    predniSONE (DELTASONE) tablet 5 mg  5 mg Oral DAILY WITH BREAKFAST    rifAMPin (RIFADIN) capsule 300 mg  300 mg Oral ACB&D    0.9% sodium chloride infusion  75 mL/hr IntraVENous CONTINUOUS    acetaminophen (TYLENOL) tablet 650 mg  650 mg Oral Q6H PRN    Or    acetaminophen (TYLENOL) suppository 650 mg  650 mg Rectal Q6H PRN    polyethylene glycol (MIRALAX) packet 17 g  17 g Oral DAILY PRN    ondansetron (ZOFRAN ODT) tablet 4 mg  4 mg Oral Q8H PRN    Or    ondansetron (ZOFRAN) injection 4 mg  4 mg IntraVENous Q6H PRN    heparin (porcine) injection 5,000 Units  5,000 Units SubCUTAneous Q8H    levoFLOXacin (LEVAQUIN) 500 mg in D5W IVPB 500 mg IntraVENous Q24H    Vancomycin Pharmacy Dosing   Other Rx Dosing/Monitoring    hydrOXYzine (VISTARIL) injection 25 mg  25 mg IntraMUSCular Q6H PRN      Vitals:    10/14/21 2312 10/14/21 2317 10/15/21 0400 10/15/21 0430   BP: (!) 192/78  (!) 175/80 (!) 150/77   Pulse: 84  86 84   Resp: 21   18   Temp:    97.8 °F (36.6 °C)   SpO2: 96% 94%     Weight:       Height:            Labs:  CBC:  Recent Labs     10/15/21  1125 10/14/21  1700   WBC 14.3* 15.0*   RBC 3.27* 3.27*   HGB 9.0* 9.1*   HCT 28.4* 28.5*   MCV 86.9 87.2   RDW 15.8* 15.9*   * 824*     CHEMISTRIES:  Recent Labs     10/15/21  1125 10/14/21  1700   * 124*   K 4.6 5.1   CL 92* 89*   CO2 26 27   BUN 9 11   CREA 0.49* 0.52*   CA 8.5 8.6   MG  --  2.0  2.1   PT/INR:No results for input(s): INR, INREXT in the last 72 hours. No lab exists for component: PROTIME  APTT:No results for input(s): APTT in the last 72 hours. LIVER PROFILE:  Recent Labs     10/15/21  1125 10/14/21  1700   AST 15 25   ALT 11* 13       IMAGING:  CT scan taken of her abdomen pelvis with contrast shows fusion of T10-L5. Hardware appears to be in good position alignment. No abscess or free fluid seen. Assessment/Plan:     Hospital Problems  Never Reviewed        Codes Class Noted POA    UTI (urinary tract infection) ICD-10-CM: N39.0  ICD-9-CM: 599.0  10/14/2021 Unknown        Sepsis ([facility] [address] ICD-10-CM: A41.9  ICD-9-CM: 038.9, 995.91  10/14/2021 Unknown            Surgical site infection of thoracolumbar surgery  Surgery performed on August 27, 2021 by Dr. Karen Newman at [facility]  No acute orthopedic surgical intervention needed at this time. Patient can continue with her follow-up with Dr. Karen Newman when she is discharged in the hospital.  Continue antibiotic care as per ID. This patient was examined direct consult with Dr. Janis Kitchen. Thank you for the courtesy of this consult.     Signed By: Violeta Bailon PA-C     October 15, 2021

## 2021-10-15 NOTE — CONSULTS
Consult Date: 10/15/2021    Consults   Sepsis    Subjective   This is a 73year old female from [facility], brought to ED by EMS because of elevated WBC. Spoke with the Physician Assistant working with her Neurosugeon, Dr. Gloria Morris MD [phone] in [place], [place]. Patient reportedly underwent throracolumbar spine surgery in August and later developed a post-operative wound infection with MRSA for which she is on Vancomycin and Rifampin. She reportedly also had a UTI at that time. She has been followed by Infectious Disease, Dr. Bc Jett. She is scheduled for Neurosurgical follow-up on Monday. On presentation here, she was afebrile but WBC was 14,300 with . Urinalysis showed marked pyuria, hematuria and Bacter. CXR showed possible lingular airspace opacity. CT Head showed no acute intracranial pathology and CT Abdomen/pelvis showed no acute findings. All images reviewed by me. Blood and urine cultures sent and patient started on Vancomycin, Rifampin, Flagyl and Levaquin. ID has been consulted for this reason. Patient lying in bed and complaint of moderate to severe low back pain. Verbal responses limited because of her pain. Past Medical History:   Diagnosis Date    Dorsalgia     Intervertebral disc disease     Spinal stenosis       No past surgical history on file. No family history on file.    Social History     Tobacco Use    Smoking status: Not on file   Substance Use Topics    Alcohol use: Not on file       Current Facility-Administered Medications   Medication Dose Route Frequency Provider Last Rate Last Admin    vancomycin (VANCOCIN) 1,000 mg in 0.9% sodium chloride 250 mL (VIAL-MATE)  1,000 mg IntraVENous Q12H Rick Chicas  mL/hr at 10/15/21 1019 1,000 mg at 10/15/21 1019    [START ON 10/16/2021] Vancomycin Trough Level 10-16-21 at 8 am   Other ONCE Cuauhtemoc Chicas MD        albuterol (PROVENTIL HFA, VENTOLIN HFA, PROAIR HFA) inhaler 2 Puff  2 Puff Inhalation Q6H PRN Johny Chicas MD        carvediloL (COREG) tablet 6.25 mg  6.25 mg Oral BID WITH MEALS Johny Chicas MD   6.25 mg at 10/15/21 0945    cevimeline (EVOXAC) capsule 30 mg  30 mg Oral TID Johny Chicas MD        donepeziL (ARICEPT) tablet 10 mg  10 mg Oral QHS Johny Chicas MD        DULoxetine (CYMBALTA) capsule 60 mg  60 mg Oral DAILY Rick Chicas MD   60 mg at 10/15/21 0945    escitalopram oxalate (LEXAPRO) tablet 20 mg  20 mg Oral DAILY Rick Chicas MD   20 mg at 10/15/21 0945    levothyroxine (SYNTHROID) tablet 150 mcg  150 mcg Oral ACB Rick Chicas MD   150 mcg at 10/15/21 0945    lisinopriL (PRINIVIL, ZESTRIL) tablet 5 mg  5 mg Oral DAILY Rick Chicas MD   5 mg at 10/15/21 0945    memantine (NAMENDA) tablet 10 mg  10 mg Oral BID Johny Chicas MD   10 mg at 10/15/21 0900    pantoprazole (PROTONIX) tablet 40 mg  40 mg Oral DAILY Rick Chicas MD   40 mg at 10/15/21 0945    predniSONE (DELTASONE) tablet 5 mg  5 mg Oral DAILY WITH BREAKFAST Rick Chicas MD   5 mg at 10/15/21 0945    rifAMPin (RIFADIN) capsule 300 mg  300 mg Oral ACB&D Johny Chicas MD   300 mg at 10/15/21 0945    0.9% sodium chloride infusion  75 mL/hr IntraVENous CONTINUOUS Rick Chicas MD 75 mL/hr at 10/14/21 2107 75 mL/hr at 10/14/21 2107    acetaminophen (TYLENOL) tablet 650 mg  650 mg Oral Q6H PRN Johny Chicas MD        Or   Baylee [place] acetaminophen (TYLENOL) suppository 650 mg  650 mg Rectal Q6H PRN Johny Chicas MD        polyethylene glycol (MIRALAX) packet 17 g  17 g Oral DAILY PRN Johny Chicas MD        ondansetron (ZOFRAN ODT) tablet 4 mg  4 mg Oral Q8H PRN Rick Chicas MD        Or    [facility] injection 4 mg  4 mg IntraVENous Q6H PRN Danilo Mercado MD        heparin (porcine) injection 5,000 Units  5,000 Units SubCUTAneous Q8H Rick Chicas MD   5,000 Units at 10/15/21 0537    levoFLOXacin (LEVAQUIN) 500 mg in D5W IVPB  500 mg IntraVENous Q24H Rick Chicas  mL/hr at 10/14/21 2106 500 mg at 10/14/21 2106    Vancomycin Pharmacy Dosing   Other Rx Dosing/Monitoring Lenore Chicas MD        hydrOXYzine (VISTARIL) injection 25 mg  25 mg IntraMUSCular Q6H PRN Lenore Chicas MD   25 mg at 10/15/21 1006        Review of Systems   Unable to perform ROS: Acuity of condition       Objective     Vital signs for last 24 hours:  Visit Vitals  BP (!) 150/77   Pulse 84   Temp 97.8 °F (36.6 °C)   Resp 18   Ht 5' 8\" (1.727 m)   Wt 200 lb (90.7 kg)   SpO2 94%   Breastfeeding No   BMI 30.41 kg/m²       Intake/Output this shift:  Current Shift: No intake/output data recorded. Last 3 Shifts: No intake/output data recorded. Data Review:   Recent Results (from the past 24 hour(s))   CBC WITH AUTOMATED DIFF    Collection Time: 10/14/21  5:00 PM   Result Value Ref Range    WBC 15.0 (H) 3.6 - 11.0 K/uL    RBC 3.27 (L) 3.80 - 5.20 M/uL    HGB 9.1 (L) 11.5 - 16.0 g/dL    HCT 28.5 (L) 35.0 - 47.0 %    MCV 87.2 80.0 - 99.0 FL    MCH 27.8 26.0 - 34.0 PG    MCHC 31.9 30.0 - 36.5 g/dL    RDW 15.9 (H) 11.5 - 14.5 %    PLATELET 344 (H) 749 - 400 K/uL    MPV 9.7 8.9 - 12.9 FL    NRBC 0.0 0.0  WBC    ABSOLUTE NRBC 0.00 0.00 - 0.01 K/uL    NEUTROPHILS 78 (H) 32 - 75 %    BAND NEUTROPHILS 2 0 - 6 %    LYMPHOCYTES 10 (L) 12 - 49 %    MONOCYTES 6 5 - 13 %    EOSINOPHILS 2 0 - 7 %    BASOPHILS 0 0 - 1 %    MYELOCYTES 2 (H) 0 %    IMMATURE GRANULOCYTES 0 %    ABS. NEUTROPHILS 12.0 (H) 1.8 - 8.0 K/UL    ABS. LYMPHOCYTES 1.5 0.8 - 3.5 K/UL    ABS. MONOCYTES 0.9 0.0 - 1.0 K/UL    ABS. EOSINOPHILS 0.3 0.0 - 0.4 K/UL    ABS. BASOPHILS 0.0 0.0 - 0.1 K/UL    ABS. IMM.  GRANS. 0.0 K/UL    PLATELET COMMENTS Large Platelets      RBC COMMENTS Anisocytosis  1+        DF Manual     METABOLIC PANEL, COMPREHENSIVE    Collection Time: 10/14/21  5:00 PM   Result Value Ref Range    Sodium 124 (L) 136 - 145 mmol/L    Potassium 5.1 3.5 - 5.1 mmol/L Chloride 89 (L) 97 - 108 mmol/L    CO2 27 21 - 32 mmol/L    Anion gap 8 5 - 15 mmol/L    Glucose 77 65 - 100 mg/dL    BUN 11 6 - 20 mg/dL    Creatinine 0.52 (L) 0.55 - 1.02 mg/dL    BUN/Creatinine ratio 21 (H) 12 - 20      GFR est AA >60 >60 ml/min/1.73m2    GFR est non-AA >60 >60 ml/min/1.73m2    Calcium 8.6 8.5 - 10.1 mg/dL    Bilirubin, total 0.4 0.2 - 1.0 mg/dL    AST (SGOT) 25 15 - 37 U/L    ALT (SGPT) 13 12 - 78 U/L    Alk.  phosphatase 124 (H) 45 - 117 U/L    Protein, total 6.9 6.4 - 8.2 g/dL    Albumin 2.0 (L) 3.5 - 5.0 g/dL    Globulin 4.9 (H) 2.0 - 4.0 g/dL    A-G Ratio 0.4 (L) 1.1 - 2.2     MAGNESIUM    Collection Time: 10/14/21  5:00 PM   Result Value Ref Range    Magnesium 2.0 1.6 - 2.4 mg/dL   LACTIC ACID    Collection Time: 10/14/21  5:00 PM   Result Value Ref Range    Lactic acid 0.7 0.4 - 2.0 mmol/L   TROPONIN-HIGH SENSITIVITY    Collection Time: 10/14/21  5:00 PM   Result Value Ref Range    Troponin-High Sensitivity 33 0 - 51 ng/L   URINALYSIS W/ REFLEX CULTURE    Collection Time: 10/14/21  5:00 PM    Specimen: Urine   Result Value Ref Range    Color Amy      Appearance Turbid (A) Clear      Specific gravity 1.024 1.003 - 1.030      pH (UA) 5.0 5.0 - 8.0      Protein 100 (A) Negative mg/dL    Glucose Negative Negative mg/dL    Ketone 5 (A) Negative mg/dL    Bilirubin Negative Negative      Blood Moderate (A) Negative      Urobilinogen 0.1 0.1 - 1.0 EU/dL    Nitrites Negative Negative      Leukocyte Esterase Moderate (A) Negative      UA:UC IF INDICATED Urine Culture Ordered (A) Culture not indicated by UA result      WBC  0 - 4 /hpf    RBC  0 - 5 /hpf    Bacteria 4+ (A) Negative /hpf    Mucus 4+ /lpf    Other Urine Micro 1      Other: Renal Epithelial cells present     CK    Collection Time: 10/14/21  5:00 PM   Result Value Ref Range    CK 89 26 - 192 U/L   MAGNESIUM    Collection Time: 10/14/21  5:00 PM   Result Value Ref Range    Magnesium 2.1 1.6 - 2.4 mg/dL   LIPASE    Collection Time: 10/14/21  5:00 PM   Result Value Ref Range    Lipase 70 (L) 73 - 393 U/L   CULTURE, BLOOD, PAIRED    Collection Time: 10/14/21  5:00 PM    Specimen: Blood   Result Value Ref Range    Special Requests: Blood      Culture result: No growth after 2 hours     NT-PRO BNP    Collection Time: 10/14/21  5:00 PM   Result Value Ref Range    NT pro-BNP 3,430 (H) <125 pg/mL   COVID-19 RAPID TEST    Collection Time: 10/14/21  6:42 PM   Result Value Ref Range    Specimen source Nasopharyngeal      COVID-19 rapid test Not Detected Not Detected     SARS-COV-2    Collection Time: 10/14/21  6:42 PM   Result Value Ref Range    SARS-CoV-2 Please find results under separate order     TROPONIN-HIGH SENSITIVITY    Collection Time: 10/14/21  9:00 PM   Result Value Ref Range    Troponin-High Sensitivity 41 0 - 51 ng/L   SED RATE (ESR)    Collection Time: 10/15/21 11:20 AM   Result Value Ref Range    Sed rate, automated 840 mm/hr   METABOLIC PANEL, COMPREHENSIVE    Collection Time: 10/15/21 11:25 AM   Result Value Ref Range    Sodium 127 (L) 136 - 145 mmol/L    Potassium 4.6 3.5 - 5.1 mmol/L    Chloride 92 (L) 97 - 108 mmol/L    CO2 26 21 - 32 mmol/L    Anion gap 9 5 - 15 mmol/L    Glucose 91 65 - 100 mg/dL    BUN 9 6 - 20 mg/dL    Creatinine 0.49 (L) 0.55 - 1.02 mg/dL    BUN/Creatinine ratio 18 12 - 20      GFR est AA >60 >60 ml/min/1.73m2    GFR est non-AA >60 >60 ml/min/1.73m2    Calcium 8.5 8.5 - 10.1 mg/dL    Bilirubin, total 0.3 0.2 - 1.0 mg/dL    AST (SGOT) 15 15 - 37 U/L    ALT (SGPT) 11 (L) 12 - 78 U/L    Alk.  phosphatase 117 45 - 117 U/L    Protein, total 6.5 6.4 - 8.2 g/dL    Albumin 2.0 (L) 3.5 - 5.0 g/dL    Globulin 4.5 (H) 2.0 - 4.0 g/dL    A-G Ratio 0.4 (L) 1.1 - 2.2     CBC WITH AUTOMATED DIFF    Collection Time: 10/15/21 11:25 AM   Result Value Ref Range    WBC 14.3 (H) 3.6 - 11.0 K/uL    RBC 3.27 (L) 3.80 - 5.20 M/uL    HGB 9.0 (L) 11.5 - 16.0 g/dL    HCT 28.4 (L) 35.0 - 47.0 %    MCV 86.9 80.0 - 99.0 FL    MCH 27.5 26.0 - 34.0 PG    MCHC 31.7 30.0 - 36.5 g/dL    RDW 15.8 (H) 11.5 - 14.5 %    PLATELET 013 (H) 667 - 400 K/uL    MPV 8.8 (L) 8.9 - 12.9 FL    NRBC 0.0 0.0  WBC    ABSOLUTE NRBC 0.00 0.00 - 0.01 K/uL    NEUTROPHILS 73 32 - 75 %    LYMPHOCYTES 11 (L) 12 - 49 %    MONOCYTES 6 5 - 13 %    EOSINOPHILS 3 0 - 7 %    BASOPHILS 1 0 - 1 %    IMMATURE GRANULOCYTES 6 (H) 0 - 0.5 %    ABS. NEUTROPHILS 10.4 (H) 1.8 - 8.0 K/UL    ABS. LYMPHOCYTES 1.6 0.8 - 3.5 K/UL    ABS. MONOCYTES 0.9 0.0 - 1.0 K/UL    ABS. EOSINOPHILS 0.4 0.0 - 0.4 K/UL    ABS. BASOPHILS 0.1 0.0 - 0.1 K/UL    ABS. IMM. GRANS. 0.8 (H) 0.00 - 0.04 K/UL    DF AUTOMATED       CT Abdomen (10/14)      Physical Exam  Vitals and nursing note reviewed. Constitutional:       General: She is not in acute distress. Appearance: She is obese. She is ill-appearing. HENT:      Right Ear: External ear normal.      Left Ear: External ear normal.      Nose: Nose normal.      Mouth/Throat:      Pharynx: Oropharynx is clear. Eyes:      Pupils: Pupils are equal, round, and reactive to light. Cardiovascular:      Rate and Rhythm: Normal rate and regular rhythm. Heart sounds: Normal heart sounds. Pulmonary:      Effort: Pulmonary effort is normal.      Breath sounds: Normal breath sounds. Abdominal:      General: Bowel sounds are normal.      Palpations: Abdomen is soft. Tenderness: There is no abdominal tenderness. There is no right CVA tenderness or left CVA tenderness. Genitourinary:     Comments: Shafer catheter  Musculoskeletal:         General: Tenderness (lower back with well-healed incision) present. Cervical back: Neck supple. Skin:     Findings: No rash. Neurological:      General: No focal deficit present. Mental Status: She is alert and oriented to person, place, and time. Psychiatric:         Behavior: Behavior normal.       ASSESSMENT/PLAN    1. Sepsis with leukocytosis, elevated ESR  2.  Probable UTI with marked pyuria and bacteriuria  3. Thoracolumbar spinal surgery with MRSA post-operative infection, on Vancomycin and Rifampin  4. Abnormal CXR with lingular infiltrate, clinical significance unclear    1. Continue IV Vancomycin and Rifampin for MRSA infection  2. Discontinue Levaquin  3. Start Meropenem IV for broader Gram negative coverage pending urine culture results  4. Discontinue Flagyl  5. In am, repeat CBC, check CRP and procal  6. Follow-up blood and urine cultures  7. Contact her ID provider, Dr. Oz Lizarraga in [place] to discuss her spine infection  8. Follow-up with Dr. Xin Andrews.  Jerry Son MD

## 2021-10-15 NOTE — H&P
History and Physical    NAME: Esthela Curtis   :  1950   MRN:  [id number]     Date/Time:  10/15/2021 10:33 AM    Patient PCP: Gena Meléndez MD  ______________________________________________________________________             Subjective:     CHIEF COMPLAINT:     Altered mental status    HISTORY OF PRESENT ILLNESS:       Patient is a 73y.o. year old female history of CAD status post CABG recent spinal surgery at T11 present with altered mental status and pain patient with history of baseline confusion but patient was more confused for last 3 days at the rehab facility patient have a T11 vertebral surgery at 488 complicated by an infection status post washout on  came to emergency room seen by the ER physician patient WBC count was elevated and patient was recommend to be admitted with altered mental status possible infection    ID and orthopedic consult was consulted    Past Medical History:   Diagnosis Date    Dorsalgia     Intervertebral disc disease     Spinal stenosis         No past surgical history on file. Social History     Tobacco Use    Smoking status: Not on file   Substance Use Topics    Alcohol use: Not on file        No family history on file.     Allergies   Allergen Reactions    Issue Unknown (comments)    Amoxicillin Unknown (comments)    Buckwheat Unknown (comments)    Cayenne Unknown (comments)    Cefaclor Unknown (comments)    Cheese Unknown (comments)    Clarithromycin Unknown (comments)    Egg Unknown (comments)    Erythromycin Unknown (comments)    Fish Containing Products Other (comments)    Gluten Unknown (comments)    Lactose Unknown (comments)    Oats Unknown (comments)    Penicillins Unknown (comments)    Safflower Oil Unknown (comments)    Sesame Seed Unknown (comments)    Sulfa (Sulfonamide Antibiotics) Unknown (comments)    Sulfamethoxazole-Trimethoprim Unknown (comments)    Tetracycline Unknown (comments)    Wheat Unknown (comments) Prior to Admission medications    Medication Sig Start Date End Date Taking? Authorizing Provider   alendronate (FOSAMAX) 35 mg tablet Take  by mouth every seven (7) days. Yes Other, MD Chaim   amitriptyline (ELAVIL) 25 mg tablet Take  by mouth nightly. Yes Other, MD Chaim   LORazepam (Ativan) 0.5 mg tablet Take  by mouth. Yes Other, MD Chaim   baclofen (LIORESAL) 10 mg tablet Take 5 mg by mouth three (3) times daily. Yes Other, MD Chaim   carvediloL (COREG) 6.25 mg tablet Take  by mouth two (2) times daily (with meals). Yes Other, MD Chaim   cetirizine (ZYRTEC) 10 mg tablet Take  by mouth. Yes Other, MD Chaim   cevimeline (EVOXAC) 30 mg capsule Take 30 mg by mouth three (3) times daily. Yes Other, MD Chaim   cyanocobalamin (VITAMIN B12) 1,000 mcg/mL injection 1,000 mcg by IntraMUSCular route once. Yes Other, MD Chaim   donepeziL (ARICEPT) 5 mg tablet Take  by mouth nightly. Yes Other, MD Chaim   bisacodyL (Dulcolax, bisacodyl,) 5 mg EC tablet Take 5 mg by mouth daily as needed for Constipation. Yes Other, MD Chaim   DULoxetine (CYMBALTA) 60 mg capsule Take 60 mg by mouth daily. Yes Other, MD Chaim   escitalopram oxalate (LEXAPRO) 20 mg tablet Take 20 mg by mouth daily. Yes Other, MD Chaim   gabapentin (NEURONTIN) 800 mg tablet Take  by mouth three (3) times daily. Yes Other, MD Chaim   levothyroxine (SYNTHROID) 150 mcg tablet Take  by mouth Daily (before breakfast). Yes Other, MD Chaim   lisinopriL (PRINIVIL, ZESTRIL) 5 mg tablet Take  by mouth daily. Yes Other, MD Chaim   meloxicam (MOBIC) 15 mg tablet Take 15 mg by mouth daily. Yes Other, MD Chaim   memantine (Namenda) 10 mg tablet Take  by mouth daily. Yes Other, MD Chaim   omeprazole (PRILOSEC) 20 mg capsule Take 20 mg by mouth daily. Yes Other, MD Chaim   oxyCODONE-acetaminophen (PERCOCET 10)  mg per tablet Take  by mouth. Yes Maria Elena, MD Chaim   predniSONE (DELTASONE) 5 mg tablet Take  by mouth.    Yes Other, Chaim MD   albuterol (Proventil HFA) 90 mcg/actuation inhaler Take  by inhalation. Yes Other, MD Chaim   rifAMPin (RIFADIN) 300 mg capsule Take  by mouth Before breakfast and dinner. Yes Other, MD Chaim   sennosides 8.6 mg cap Take  by mouth.    Yes Other, MD Chaim         Current Facility-Administered Medications:     vancomycin (VANCOCIN) 1,000 mg in 0.9% sodium chloride 250 mL (VIAL-MATE), 1,000 mg, IntraVENous, Q12H, Rick Chicas MD, Last Rate: 250 mL/hr at 10/15/21 1019, 1,000 mg at 10/15/21 1019    [START ON 10/16/2021] Vancomycin Trough Level 10-16-21 at 8 am, , Other, ONCE, Rick Chicas MD    albuterol (PROVENTIL HFA, VENTOLIN HFA, PROAIR HFA) inhaler 2 Puff, 2 Puff, Inhalation, Q6H PRN, Rick Chicas MD    carvediloL (COREG) tablet 6.25 mg, 6.25 mg, Oral, BID WITH MEALS, Rick Chicas MD, 6.25 mg at 10/15/21 0945    cevimeline (EVOXAC) capsule 30 mg, 30 mg, Oral, TID, Gil Chicas MD    donepeziL (ARICEPT) tablet 10 mg, 10 mg, Oral, QHS, Rick Chicas MD    DULoxetine (CYMBALTA) capsule 60 mg, 60 mg, Oral, DAILY, Rick Chicas MD, 60 mg at 10/15/21 0945    escitalopram oxalate (LEXAPRO) tablet 20 mg, 20 mg, Oral, DAILY, Rick Chicas MD, 20 mg at 10/15/21 0945    levothyroxine (SYNTHROID) tablet 150 mcg, 150 mcg, Oral, ACB, Rick Chicas MD, 150 mcg at 10/15/21 0945    lisinopriL (PRINIVIL, ZESTRIL) tablet 5 mg, 5 mg, Oral, DAILY, Rick Chicas MD, 5 mg at 10/15/21 0945    memantine (NAMENDA) tablet 10 mg, 10 mg, Oral, BID, Rick Chicas MD, 10 mg at 10/15/21 0900    pantoprazole (PROTONIX) tablet 40 mg, 40 mg, Oral, DAILY, Rick Chicas MD, 40 mg at 10/15/21 0945    predniSONE (DELTASONE) tablet 5 mg, 5 mg, Oral, DAILY WITH BREAKFAST, Avtar Mcginnis MD, 5 mg at 10/15/21 0945    rifAMPin (RIFADIN) capsule 300 mg, 300 mg, Oral, ACB&D, Rick Chicas MD, 300 mg at 10/15/21 0945    0.9% sodium chloride infusion, 75 mL/hr, IntraVENous, CONTINUOUS, Rick Chicas MD, Last Rate: 75 mL/hr at 10/14/21 2107, 75 mL/hr at 10/14/21 2107    acetaminophen (TYLENOL) tablet 650 mg, 650 mg, Oral, Q6H PRN **OR** acetaminophen (TYLENOL) suppository 650 mg, 650 mg, Rectal, Q6H PRN, Balbir Chicas MD    polyethylene glycol (MIRALAX) packet 17 g, 17 g, Oral, DAILY PRN, Balbir Chicas MD    ondansetron (ZOFRAN ODT) tablet 4 mg, 4 mg, Oral, Q8H PRN **OR** ondansetron (ZOFRAN) injection 4 mg, 4 mg, IntraVENous, Q6H PRN, Rick Chicas MD    heparin (porcine) injection 5,000 Units, 5,000 Units, SubCUTAneous, Q8H, Rick Chicas MD, 5,000 Units at 10/15/21 0537    levoFLOXacin (LEVAQUIN) 500 mg in D5W IVPB, 500 mg, IntraVENous, Q24H, Rick Chicas MD, Last Rate: 100 mL/hr at 10/14/21 2106, 500 mg at 10/14/21 2106    Vancomycin Pharmacy Dosing, , Other, Rx Dosing/Monitoring, Balbir Chicas MD    hydrOXYzine (VISTARIL) injection 25 mg, 25 mg, IntraMUSCular, Q6H PRN, Rick Chicas MD, 25 mg at 10/15/21 1006    LAB DATA REVIEWED:    Recent Results (from the past 24 hour(s))   CBC WITH AUTOMATED DIFF    Collection Time: 10/14/21  5:00 PM   Result Value Ref Range    WBC 15.0 (H) 3.6 - 11.0 K/uL    RBC 3.27 (L) 3.80 - 5.20 M/uL    HGB 9.1 (L) 11.5 - 16.0 g/dL    HCT 28.5 (L) 35.0 - 47.0 %    MCV 87.2 80.0 - 99.0 FL    MCH 27.8 26.0 - 34.0 PG    MCHC 31.9 30.0 - 36.5 g/dL    RDW 15.9 (H) 11.5 - 14.5 %    PLATELET 366 (H) 522 - 400 K/uL    MPV 9.7 8.9 - 12.9 FL    NRBC 0.0 0.0  WBC    ABSOLUTE NRBC 0.00 0.00 - 0.01 K/uL    NEUTROPHILS 78 (H) 32 - 75 %    BAND NEUTROPHILS 2 0 - 6 %    LYMPHOCYTES 10 (L) 12 - 49 %    MONOCYTES 6 5 - 13 %    EOSINOPHILS 2 0 - 7 %    BASOPHILS 0 0 - 1 %    MYELOCYTES 2 (H) 0 %    IMMATURE GRANULOCYTES 0 %    ABS. NEUTROPHILS 12.0 (H) 1.8 - 8.0 K/UL    ABS. LYMPHOCYTES 1.5 0.8 - 3.5 K/UL    ABS. MONOCYTES 0.9 0.0 - 1.0 K/UL    ABS. EOSINOPHILS 0.3 0.0 - 0.4 K/UL    ABS. BASOPHILS 0.0 0.0 - 0.1 K/UL    ABS. IMM. GRANS. 0.0 K/UL    PLATELET COMMENTS Large Platelets      RBC COMMENTS Anisocytosis  1+        DF Manual     METABOLIC PANEL, COMPREHENSIVE    Collection Time: 10/14/21  5:00 PM   Result Value Ref Range    Sodium 124 (L) 136 - 145 mmol/L    Potassium 5.1 3.5 - 5.1 mmol/L    Chloride 89 (L) 97 - 108 mmol/L    CO2 27 21 - 32 mmol/L    Anion gap 8 5 - 15 mmol/L    Glucose 77 65 - 100 mg/dL    BUN 11 6 - 20 mg/dL    Creatinine 0.52 (L) 0.55 - 1.02 mg/dL    BUN/Creatinine ratio 21 (H) 12 - 20      GFR est AA >60 >60 ml/min/1.73m2    GFR est non-AA >60 >60 ml/min/1.73m2    Calcium 8.6 8.5 - 10.1 mg/dL    Bilirubin, total 0.4 0.2 - 1.0 mg/dL    AST (SGOT) 25 15 - 37 U/L    ALT (SGPT) 13 12 - 78 U/L    Alk.  phosphatase 124 (H) 45 - 117 U/L    Protein, total 6.9 6.4 - 8.2 g/dL    Albumin 2.0 (L) 3.5 - 5.0 g/dL    Globulin 4.9 (H) 2.0 - 4.0 g/dL    A-G Ratio 0.4 (L) 1.1 - 2.2     MAGNESIUM    Collection Time: 10/14/21  5:00 PM   Result Value Ref Range    Magnesium 2.0 1.6 - 2.4 mg/dL   LACTIC ACID    Collection Time: 10/14/21  5:00 PM   Result Value Ref Range    Lactic acid 0.7 0.4 - 2.0 mmol/L   TROPONIN-HIGH SENSITIVITY    Collection Time: 10/14/21  5:00 PM   Result Value Ref Range    Troponin-High Sensitivity 33 0 - 51 ng/L   URINALYSIS W/ REFLEX CULTURE    Collection Time: 10/14/21  5:00 PM    Specimen: Urine   Result Value Ref Range    Color Amy      Appearance Turbid (A) Clear      Specific gravity 1.024 1.003 - 1.030      pH (UA) 5.0 5.0 - 8.0      Protein 100 (A) Negative mg/dL    Glucose Negative Negative mg/dL    Ketone 5 (A) Negative mg/dL    Bilirubin Negative Negative      Blood Moderate (A) Negative      Urobilinogen 0.1 0.1 - 1.0 EU/dL    Nitrites Negative Negative      Leukocyte Esterase Moderate (A) Negative      UA:UC IF INDICATED Urine Culture Ordered (A) Culture not indicated by UA result      WBC  0 - 4 /hpf    RBC  0 - 5 /hpf    Bacteria 4+ (A) Negative /hpf    Mucus 4+ /lpf    Other Urine Micro 1      Other: Renal Epithelial cells present     CK    Collection Time: 10/14/21  5:00 PM   Result Value Ref Range    CK 89 26 - 192 U/L   MAGNESIUM    Collection Time: 10/14/21  5:00 PM   Result Value Ref Range    Magnesium 2.1 1.6 - 2.4 mg/dL   LIPASE    Collection Time: 10/14/21  5:00 PM   Result Value Ref Range    Lipase 70 (L) 73 - 393 U/L   CULTURE, BLOOD, PAIRED    Collection Time: 10/14/21  5:00 PM    Specimen: Blood   Result Value Ref Range    Special Requests: Blood      Culture result: No growth after 2 hours     COVID-19 RAPID TEST    Collection Time: 10/14/21  6:42 PM   Result Value Ref Range    Specimen source Nasopharyngeal      COVID-19 rapid test Not Detected Not Detected     SARS-COV-2    Collection Time: 10/14/21  6:42 PM   Result Value Ref Range    SARS-CoV-2 Please find results under separate order     TROPONIN-HIGH SENSITIVITY    Collection Time: 10/14/21  9:00 PM   Result Value Ref Range    Troponin-High Sensitivity 41 0 - 51 ng/L       XR Results (most recent):  Results from [place] encounter on 10/14/21    XR CHEST PORT    Narrative  Exam: XR CHEST PORT    Indication:  Altered mental status; Comparison: None    Findings:    Cardiomegaly mediastinal silhouette is mildly enlarged given the projection. Thickening of the pulmonary interstitium with mild cephalization of the  pulmonary vessels. Suggestion of a lingular airspace opacity. No pneumothorax. Cervical and thoracic lumbar fusion hardware surgical clips are seen within the  left upper quadrant. Right upper extremity PICC line is noted terminating at the  SVC. No pleural effusion. No pneumothorax. Impression  1. Possible lingular airspace opacity could represent pneumonia or atelectasis. 2. Mild interstitial pulmonary edema. CT ABD PELV W CONT   Final Result   No acute findings. Constipation      XR CHEST PORT   Final Result   1. Possible lingular airspace opacity could represent pneumonia or atelectasis. 2. Mild interstitial pulmonary edema. CT HEAD WO CONT   Final Result   No acute intracranial abnormality. Review of Systems:  Unable to obtain. Objective:   VITALS:    Visit Vitals  BP (!) 150/77   Pulse 84   Temp 97.8 °F (36.6 °C)   Resp 18   Ht 5' 8\" (1.727 m)   Wt 90.7 kg (200 lb)   SpO2 94%   Breastfeeding No   BMI 30.41 kg/m²       Physical Exam:   Constitutional: Awake confused. HENT:   Head: Normocephalic and atraumatic. Eyes: Pupils are equal, round, and reactive to light. EOM are normal.   Cardiovascular: Normal rate, regular rhythm and normal heart sounds. Pulmonary/Chest: Breath sounds normal. No wheezes. No rales. Exhibits no tenderness. Abdominal: Soft. Bowel sounds are normal. There is no abdominal tenderness. There is no rebound and no guarding. Musculoskeletal: Normal range of motion.    Neurological: Awake confused    ASSESSMENT & PLAN:    Altered mental status  Metabolic encephalopathy  Leukocytosis  UTI  History of CAD status post CABG  Recent spinal surgery at Z[facility] complicated by infection status post washout  Hypertension  Dementia  Depression  Hypothyroidism  GERD      Current Facility-Administered Medications:     vancomycin (VANCOCIN) 1,000 mg in 0.9% sodium chloride 250 mL (VIAL-MATE), 1,000 mg, IntraVENous, Q12H, Rick Chicas MD, Last Rate: 250 mL/hr at 10/15/21 1019, 1,000 mg at 10/15/21 1019    [START ON 10/16/2021] Vancomycin Trough Level 10-16-21 at 8 am, , Other, ONCE, Rick Chicas MD    albuterol (PROVENTIL HFA, VENTOLIN HFA, PROAIR HFA) inhaler 2 Puff, 2 Puff, Inhalation, Q6H PRN, Rick Chicas MD    carvediloL (COREG) tablet 6.25 mg, 6.25 mg, Oral, BID WITH MEALS, Rick Chicas MD, 6.25 mg at 10/15/21 0945    cevimeline (EVOXAC) capsule 30 mg, 30 mg, Oral, TID, Rick Chicas MD    donepeziL (ARICEPT) tablet 10 mg, 10 mg, Oral, QHS, Rick Chicas MD    DULoxetine (CYMBALTA) capsule 60 mg, 60 mg, Oral, DAILY, Vonda Le MD, 60 mg at 10/15/21 0945    escitalopram oxalate (LEXAPRO) tablet 20 mg, 20 mg, Oral, DAILY, Rick Chicas MD, 20 mg at 10/15/21 0945    levothyroxine (SYNTHROID) tablet 150 mcg, 150 mcg, Oral, ACB, Barbara Chicas MD, 150 mcg at 10/15/21 0945    lisinopriL (PRINIVIL, ZESTRIL) tablet 5 mg, 5 mg, Oral, DAILY, Rick Chicas MD, 5 mg at 10/15/21 0945    memantine (NAMENDA) tablet 10 mg, 10 mg, Oral, BID, Barbara Chicas MD, 10 mg at 10/15/21 0900    pantoprazole (PROTONIX) tablet 40 mg, 40 mg, Oral, DAILY, Rick Chicas MD, 40 mg at 10/15/21 0945    predniSONE (DELTASONE) tablet 5 mg, 5 mg, Oral, DAILY WITH BREAKFAST, Rick Chicas MD, 5 mg at 10/15/21 0945    rifAMPin (RIFADIN) capsule 300 mg, 300 mg, Oral, ACB&D, Barbara Chicas MD, 300 mg at 10/15/21 0945    0.9% sodium chloride infusion, 75 mL/hr, IntraVENous, CONTINUOUS, Rick Chicas MD, Last Rate: 75 mL/hr at 10/14/21 2107, 75 mL/hr at 10/14/21 2107    acetaminophen (TYLENOL) tablet 650 mg, 650 mg, Oral, Q6H PRN **OR** acetaminophen (TYLENOL) suppository 650 mg, 650 mg, Rectal, Q6H PRN, Barbara Chicas MD    polyethylene glycol (MIRALAX) packet 17 g, 17 g, Oral, DAILY PRN, Barbara Chicas MD    ondansetron (ZOFRAN ODT) tablet 4 mg, 4 mg, Oral, Q8H PRN **OR** ondansetron (ZOFRAN) injection 4 mg, 4 mg, IntraVENous, Q6H PRN, Rick Chicas MD    heparin (porcine) injection 5,000 Units, 5,000 Units, SubCUTAneous, Q8H, Rick Chicas MD, 5,000 Units at 10/15/21 0537    levoFLOXacin (LEVAQUIN) 500 mg in D5W IVPB, 500 mg, IntraVENous, Q24H, Rick Chicas MD, Last Rate: 100 mL/hr at 10/14/21 2106, 500 mg at 10/14/21 2106    Vancomycin Pharmacy Dosing, , Other, Rx Dosing/Monitoring, Rick Chicas MD    hydrOXYzine (VISTARIL) injection 25 mg, 25 mg, IntraMUSCular, Q6H PRN, Vonda Le MD, 25 mg at 10/15/21 1006       Patient admitted to medical telemetry floor infectious disease and orthopedic consult patient start on IV vancomycin and Levaquin as patient allergic to multiple medications    PT OT consult    Follow-up the cultures          ________________________________________________________________________    Signed: Haris Lee MD

## 2021-10-15 NOTE — PROGRESS NOTES
Patient arrived via stretcher from ER X 2 staff members with complaints of discomfort from previous back surgery on 8/27 and wash out 09/27; redden around the 18 sutures and with some slight edema noted; small amount of ecchymotic spots on bilateral lower extremities, and some redness noted to pinky toes bilaterally, dry flaky skin to lower extremities bilaterally. Old surgical scar to abdomen, PICC line dressing in place and clean. Skin assessment completed with Aub[facility] charge nurse. Devon Mcnulty

## 2021-10-15 NOTE — PROGRESS NOTES
Vancomycin 1000mg IV every 12 hours beginning 10-15-21 at 10am (patient got initial dose in ED on the 14th).  Trough level has been scheduled for 10-16-21 at 8am

## 2021-10-16 LAB
BASOPHILS # BLD: 0.1 K/UL (ref 0–0.1)
BASOPHILS NFR BLD: 1 % (ref 0–1)
CRP SERPL-MCNC: 8.5 MG/DL (ref 0–0.6)
DATE LAST DOSE: NORMAL
DIFFERENTIAL METHOD BLD: ABNORMAL
EOSINOPHIL # BLD: 0.5 K/UL (ref 0–0.4)
EOSINOPHIL NFR BLD: 4 % (ref 0–7)
ERYTHROCYTE [DISTWIDTH] IN BLOOD BY AUTOMATED COUNT: 15.7 % (ref 11.5–14.5)
HCT VFR BLD AUTO: 28.3 % (ref 35–47)
HGB BLD-MCNC: 8.9 G/DL (ref 11.5–16)
IMM GRANULOCYTES # BLD AUTO: 0 K/UL
IMM GRANULOCYTES NFR BLD AUTO: 0 %
LYMPHOCYTES # BLD: 2.3 K/UL (ref 0.8–3.5)
LYMPHOCYTES NFR BLD: 17 % (ref 12–49)
MCH RBC QN AUTO: 27.4 PG (ref 26–34)
MCHC RBC AUTO-ENTMCNC: 31.4 G/DL (ref 30–36.5)
MCV RBC AUTO: 87.1 FL (ref 80–99)
MONOCYTES # BLD: 1.1 K/UL (ref 0–1)
MONOCYTES NFR BLD: 8 % (ref 5–13)
NEUTS SEG # BLD: 9.6 K/UL (ref 1.8–8)
NEUTS SEG NFR BLD: 70 % (ref 32–75)
NRBC # BLD: 0 K/UL (ref 0–0.01)
NRBC BLD-RTO: 0 PER 100 WBC
PLATELET # BLD AUTO: 743 K/UL (ref 150–400)
PMV BLD AUTO: 9.6 FL (ref 8.9–12.9)
PROCALCITONIN SERPL-MCNC: <0.05 NG/ML
RBC # BLD AUTO: 3.25 M/UL (ref 3.8–5.2)
RBC MORPH BLD: ABNORMAL
REPORTED DOSE,DOSE: NORMAL UNITS
VANCOMYCIN TROUGH SERPL-MCNC: 8.8 UG/ML (ref 5–10)
WBC # BLD AUTO: 13.6 K/UL (ref 3.6–11)

## 2021-10-16 PROCEDURE — 86140 C-REACTIVE PROTEIN: CPT

## 2021-10-16 PROCEDURE — 99232 SBSQ HOSP IP/OBS MODERATE 35: CPT | Performed by: INTERNAL MEDICINE

## 2021-10-16 PROCEDURE — 74011636637 HC RX REV CODE- 636/637: Performed by: FAMILY MEDICINE

## 2021-10-16 PROCEDURE — 74011000250 HC RX REV CODE- 250: Performed by: INTERNAL MEDICINE

## 2021-10-16 PROCEDURE — 85025 COMPLETE CBC W/AUTO DIFF WBC: CPT

## 2021-10-16 PROCEDURE — 74011250636 HC RX REV CODE- 250/636: Performed by: INTERNAL MEDICINE

## 2021-10-16 PROCEDURE — 36415 COLL VENOUS BLD VENIPUNCTURE: CPT

## 2021-10-16 PROCEDURE — 65270000032 HC RM SEMIPRIVATE

## 2021-10-16 PROCEDURE — 80202 ASSAY OF VANCOMYCIN: CPT

## 2021-10-16 PROCEDURE — 74011250636 HC RX REV CODE- 250/636: Performed by: FAMILY MEDICINE

## 2021-10-16 PROCEDURE — 84145 PROCALCITONIN (PCT): CPT

## 2021-10-16 PROCEDURE — 74011250637 HC RX REV CODE- 250/637: Performed by: FAMILY MEDICINE

## 2021-10-16 RX ADMIN — LEVOTHYROXINE SODIUM 150 MCG: 0.07 TABLET ORAL at 08:15

## 2021-10-16 RX ADMIN — LISINOPRIL 5 MG: 5 TABLET ORAL at 08:15

## 2021-10-16 RX ADMIN — CEVIMELINE 30 MG: 30 CAPSULE ORAL at 16:00

## 2021-10-16 RX ADMIN — RIFAMPIN 300 MG: 300 CAPSULE ORAL at 08:15

## 2021-10-16 RX ADMIN — VANCOMYCIN HYDROCHLORIDE 1000 MG: 1 INJECTION, POWDER, LYOPHILIZED, FOR SOLUTION INTRAVENOUS at 09:10

## 2021-10-16 RX ADMIN — PREDNISONE 5 MG: 5 TABLET ORAL at 08:15

## 2021-10-16 RX ADMIN — DULOXETINE 60 MG: 30 CAPSULE, DELAYED RELEASE ORAL at 08:15

## 2021-10-16 RX ADMIN — MEROPENEM 1 G: 1 INJECTION, POWDER, FOR SOLUTION INTRAVENOUS at 18:30

## 2021-10-16 RX ADMIN — MEROPENEM 1 G: 1 INJECTION, POWDER, FOR SOLUTION INTRAVENOUS at 01:32

## 2021-10-16 RX ADMIN — CARVEDILOL 6.25 MG: 3.12 TABLET, FILM COATED ORAL at 08:14

## 2021-10-16 RX ADMIN — ESCITALOPRAM OXALATE 20 MG: 10 TABLET ORAL at 10:06

## 2021-10-16 RX ADMIN — DONEPEZIL HYDROCHLORIDE 10 MG: 5 TABLET, FILM COATED ORAL at 22:14

## 2021-10-16 RX ADMIN — PANTOPRAZOLE SODIUM 40 MG: 40 TABLET, DELAYED RELEASE ORAL at 08:15

## 2021-10-16 RX ADMIN — MEMANTINE HYDROCHLORIDE 10 MG: 10 TABLET ORAL at 08:15

## 2021-10-16 RX ADMIN — RIFAMPIN 300 MG: 300 CAPSULE ORAL at 17:31

## 2021-10-16 RX ADMIN — CEVIMELINE 30 MG: 30 CAPSULE ORAL at 21:00

## 2021-10-16 RX ADMIN — VANCOMYCIN HYDROCHLORIDE 1000 MG: 1 INJECTION, POWDER, LYOPHILIZED, FOR SOLUTION INTRAVENOUS at 18:31

## 2021-10-16 RX ADMIN — MEROPENEM 1 G: 1 INJECTION, POWDER, FOR SOLUTION INTRAVENOUS at 08:45

## 2021-10-16 RX ADMIN — MEMANTINE HYDROCHLORIDE 10 MG: 10 TABLET ORAL at 22:14

## 2021-10-16 RX ADMIN — HEPARIN SODIUM 5000 UNITS: 5000 INJECTION INTRAVENOUS; SUBCUTANEOUS at 22:14

## 2021-10-16 RX ADMIN — CARVEDILOL 6.25 MG: 3.12 TABLET, FILM COATED ORAL at 17:00

## 2021-10-16 RX ADMIN — HEPARIN SODIUM 5000 UNITS: 5000 INJECTION INTRAVENOUS; SUBCUTANEOUS at 05:15

## 2021-10-16 RX ADMIN — CEVIMELINE 30 MG: 30 CAPSULE ORAL at 08:15

## 2021-10-16 RX ADMIN — SODIUM CHLORIDE 75 ML/HR: 9 INJECTION, SOLUTION INTRAVENOUS at 11:41

## 2021-10-16 RX ADMIN — HEPARIN SODIUM 5000 UNITS: 5000 INJECTION INTRAVENOUS; SUBCUTANEOUS at 14:27

## 2021-10-16 NOTE — PROGRESS NOTES
Progress Note    Patient: Mireya Prajapati MRN: [id number]  SSN: [id number]    YOB: 1950  Age: 73 y.o. Sex: female      Admit Date: 10/14/2021    LOS: 2 days     Subjective:   Patient followed sepsis with suspected UTI and ongoing post-operative infection from spinal surgery elsewhere, secondary to MRSA. She remains afebrile with decreasing WBC. ESR and CRP elevated. Urine culture growing Gram negative rods. Blood cultures negative so far. Currently on IV Vancomycin, Rifampin, Meropenem. Patient resting comfortably with  at bedside. No new complaints. Objective:     Vitals:    10/14/21 2317 10/15/21 0400 10/15/21 0430 10/15/21 2121   BP:  (!) 175/80 (!) 150/77 (!) 180/77   Pulse:  86 84 84   Resp:   18 19   Temp:   97.8 °F (36.6 °C) 97.8 °F (36.6 °C)   SpO2: 94%   96%   Weight:       Height:            Intake and Output:  Current Shift: No intake/output data recorded. Last three shifts: 10/14 1901 - 10/16 0700  In: -   Out: 600 [Urine:600]    Physical Exam:   Vitals and nursing note reviewed. Constitutional:       General: She is not in acute distress. Appearance: She is obese. She is ill-appearing. Genitourinary:     Comments: Shafer catheter  Musculoskeletal:         General: Tenderness (lower back with well-healed incision) present. Cervical back: Neck supple. Skin:     Findings: No rash. Neurological:      General: No focal deficit present. Mental Status: She is alert and oriented to person, place, and time. Psychiatric:         Behavior: Behavior normal.      Lab/Data Review:     WBC 13,600    Vancomycin trough 8.8    CRP 8.50      Blood cultures (10/14)  Gram positive cocci in clusters in 3 of 4 bottles  Urine culture (10/14) >100,000 cfu/ml Gram negative rods  Assessment:     Active Problems:    UTI (urinary tract infection) (10/14/2021)      Sepsis ([address] (10/14/2021)    1.  Sepsis with leukocytosis, elevated ESR, CRP  2. UTI, uncomplicated so far, with marked pyuria and bacteriuria, secondary to Gram negative rods, Day #2 IV Meropenem  3. Thoracolumbar spinal surgery with MRSA post-operative infection, on Vancomycin and Rifampin  4. Positive blood cultures with Gram positive cocci in clusters, identification pending, possibly MRSA, on Vancomycin  5. Abnormal CXR with lingular infiltrate, clinical significance unclear      Comment:  Positive blood cultures very concerning whether identified as MRSA or coagulase negative staphylococci because it is occurring while on Vancomycin, despite therapeutic trough today. Plan:   1. Continue IV Vancomycin and Rifampin for MRSA infection  2. Continue Meropenem IV pending urine culture results  3. In am, repeat CBC, CRP and procal  4. Follow-up blood and urine cultures  5. Contact her ID provider, Dr. Maty Costello in [place] to discuss her spine infection  6.  Follow-up with Dr. Marsha Painter at discharge (he is aware of hospitalization)    Signed By: Dinorah Glez MD     October 16, 2021

## 2021-10-16 NOTE — PROGRESS NOTES
Day:03  Vancomycin Trough was sub therapeutic today with the current regimen of Vanc 1000 mg every 12 hours. Adjusted the regimen to Vanc 1000 mg every 8 hours and Scheduled Trough on 10/17 at 1600. Patient has a history of MRSA infection.

## 2021-10-17 LAB
BACTERIA SPEC CULT: ABNORMAL
BACTERIA SPEC CULT: ABNORMAL
COLONY COUNT,CNT: ABNORMAL
DATE LAST DOSE: ABNORMAL
REPORTED DOSE,DOSE: ABNORMAL UNITS
SPECIAL REQUESTS,SREQ: ABNORMAL
VANCOMYCIN TROUGH SERPL-MCNC: 13.8 UG/ML (ref 5–10)

## 2021-10-17 PROCEDURE — 74011636637 HC RX REV CODE- 636/637: Performed by: FAMILY MEDICINE

## 2021-10-17 PROCEDURE — 74011250637 HC RX REV CODE- 250/637: Performed by: FAMILY MEDICINE

## 2021-10-17 PROCEDURE — 65270000032 HC RM SEMIPRIVATE

## 2021-10-17 PROCEDURE — 36415 COLL VENOUS BLD VENIPUNCTURE: CPT

## 2021-10-17 PROCEDURE — 74011000250 HC RX REV CODE- 250: Performed by: INTERNAL MEDICINE

## 2021-10-17 PROCEDURE — 74011250636 HC RX REV CODE- 250/636: Performed by: INTERNAL MEDICINE

## 2021-10-17 PROCEDURE — 99232 SBSQ HOSP IP/OBS MODERATE 35: CPT | Performed by: INTERNAL MEDICINE

## 2021-10-17 PROCEDURE — 80202 ASSAY OF VANCOMYCIN: CPT

## 2021-10-17 PROCEDURE — 74011250636 HC RX REV CODE- 250/636: Performed by: FAMILY MEDICINE

## 2021-10-17 PROCEDURE — 74011250637 HC RX REV CODE- 250/637: Performed by: INTERNAL MEDICINE

## 2021-10-17 RX ORDER — LISINOPRIL 20 MG/1
20 TABLET ORAL 2 TIMES DAILY
Status: DISCONTINUED | OUTPATIENT
Start: 2021-10-18 | End: 2021-10-20

## 2021-10-17 RX ORDER — CARVEDILOL 12.5 MG/1
25 TABLET ORAL 2 TIMES DAILY WITH MEALS
Status: DISCONTINUED | OUTPATIENT
Start: 2021-10-17 | End: 2021-10-21 | Stop reason: HOSPADM

## 2021-10-17 RX ADMIN — VANCOMYCIN HYDROCHLORIDE 1000 MG: 1 INJECTION, POWDER, LYOPHILIZED, FOR SOLUTION INTRAVENOUS at 10:23

## 2021-10-17 RX ADMIN — PREDNISONE 5 MG: 5 TABLET ORAL at 10:13

## 2021-10-17 RX ADMIN — RIFAMPIN 300 MG: 300 CAPSULE ORAL at 18:14

## 2021-10-17 RX ADMIN — LEVOTHYROXINE SODIUM 150 MCG: 0.07 TABLET ORAL at 10:12

## 2021-10-17 RX ADMIN — CEVIMELINE 30 MG: 30 CAPSULE ORAL at 21:00

## 2021-10-17 RX ADMIN — MEMANTINE HYDROCHLORIDE 10 MG: 10 TABLET ORAL at 10:19

## 2021-10-17 RX ADMIN — ACETAMINOPHEN 650 MG: 325 TABLET ORAL at 10:12

## 2021-10-17 RX ADMIN — MEMANTINE HYDROCHLORIDE 10 MG: 10 TABLET ORAL at 10:12

## 2021-10-17 RX ADMIN — VANCOMYCIN HYDROCHLORIDE 1000 MG: 1 INJECTION, POWDER, LYOPHILIZED, FOR SOLUTION INTRAVENOUS at 01:46

## 2021-10-17 RX ADMIN — LISINOPRIL 5 MG: 5 TABLET ORAL at 10:13

## 2021-10-17 RX ADMIN — RIFAMPIN 300 MG: 300 CAPSULE ORAL at 10:12

## 2021-10-17 RX ADMIN — VANCOMYCIN HYDROCHLORIDE 1000 MG: 1 INJECTION, POWDER, LYOPHILIZED, FOR SOLUTION INTRAVENOUS at 18:18

## 2021-10-17 RX ADMIN — CARVEDILOL 25 MG: 12.5 TABLET, FILM COATED ORAL at 22:01

## 2021-10-17 RX ADMIN — CARVEDILOL 6.25 MG: 3.12 TABLET, FILM COATED ORAL at 10:12

## 2021-10-17 RX ADMIN — ESCITALOPRAM OXALATE 20 MG: 10 TABLET ORAL at 10:12

## 2021-10-17 RX ADMIN — MEROPENEM 1 G: 1 INJECTION, POWDER, FOR SOLUTION INTRAVENOUS at 01:45

## 2021-10-17 RX ADMIN — PANTOPRAZOLE SODIUM 40 MG: 40 TABLET, DELAYED RELEASE ORAL at 10:12

## 2021-10-17 RX ADMIN — MEMANTINE HYDROCHLORIDE 10 MG: 10 TABLET ORAL at 22:01

## 2021-10-17 RX ADMIN — CARVEDILOL 6.25 MG: 3.12 TABLET, FILM COATED ORAL at 18:14

## 2021-10-17 RX ADMIN — DULOXETINE 60 MG: 30 CAPSULE, DELAYED RELEASE ORAL at 10:12

## 2021-10-17 RX ADMIN — MEROPENEM 1 G: 1 INJECTION, POWDER, FOR SOLUTION INTRAVENOUS at 10:23

## 2021-10-17 RX ADMIN — DONEPEZIL HYDROCHLORIDE 10 MG: 5 TABLET, FILM COATED ORAL at 22:01

## 2021-10-17 RX ADMIN — CEVIMELINE 30 MG: 30 CAPSULE ORAL at 16:00

## 2021-10-17 RX ADMIN — HEPARIN SODIUM 5000 UNITS: 5000 INJECTION INTRAVENOUS; SUBCUTANEOUS at 06:14

## 2021-10-17 RX ADMIN — CEVIMELINE 30 MG: 30 CAPSULE ORAL at 09:00

## 2021-10-17 RX ADMIN — HEPARIN SODIUM 5000 UNITS: 5000 INJECTION INTRAVENOUS; SUBCUTANEOUS at 22:02

## 2021-10-17 RX ADMIN — MEROPENEM 1 G: 1 INJECTION, POWDER, FOR SOLUTION INTRAVENOUS at 18:18

## 2021-10-17 RX ADMIN — HEPARIN SODIUM 5000 UNITS: 5000 INJECTION INTRAVENOUS; SUBCUTANEOUS at 13:08

## 2021-10-17 NOTE — PROGRESS NOTES
Day #4 of Vancomycin    Indication for Antimicrobials: SSTi     Consult placed by: Dr. Beatriz Dillon    Significant Cultures:   10/14 blood: coag neg staph /, GPC clusters 3/ - prelim  10/14 urine: C. Coli, klebsiella pneumoniae    Labs:  Recent Labs     10/16/21  0648 10/15/21  1125   CREA  --  0.49*   BUN  --  9   WBC 13.6* 14.3*     Temp (24hrs), Av.9 °F (36.6 °C), Min:97.5 °F (36.4 °C), Max:98.4 °F (36.9 °C)      Is the Patient on Dialysis? No    Creatinine Clearance (mL/min):   CrCl (Actual Body Weight): 134.8  CrCl (Adjusted Body Weight): 99.8  CrCl (Ideal Body Weight): 76.5    Goal level: AUC/RANULFO 400-600     Date Dose & Interval Associated AUC Measured Level    10/16 @ 1304 1000 mg q12h 300 8.8   10/17 @ 1615 1000 mg q8h 357 13.8             Impression/Plan:   Will continue current regimen of 1000 mg IV q8h  Projected steady state AUC ~447 with trough 16.2  Will get trough on 10/19     Pharmacy will follow daily and adjust medications as appropriate for renal function and/or serum levels.     Thank you,  [facility]

## 2021-10-17 NOTE — PROGRESS NOTES
Progress Note    Patient: Antwan Templeton MRN: [id number]  SSN: [id number]    YOB: 1950  Age: 73 y.o. Sex: female      Admit Date: 10/14/2021    LOS: 2 days     Subjective:     73years old with postoperative infection of spine surgery with possible UTI y    Objective:     Vitals:    10/14/21 2317 10/15/21 0400 10/15/21 0430 10/15/21 2121   BP:  (!) 175/80 (!) 150/77 (!) 180/77   Pulse:  86 84 84   Resp:   18 19   Temp:   97.8 °F (36.6 °C) 97.8 °F (36.6 °C)   SpO2: 94%   96%   Weight:       Height:            Intake and Output:  Current Shift: No intake/output data recorded. Last three shifts: 10/15 0701 - 10/16 1900  In: -   Out: 600 [Urine:600]    Physical Exam:   General:  Alert, cooperative, no distress, appears stated age. Eyes:  Conjunctivae/corneas clear. PERRL, EOMs intact. Fundi benign   Ears:  Normal TMs and external ear canals both ears. Nose: Nares normal. Septum midline. Mucosa normal. No drainage or sinus tenderness. Mouth/Throat: Lips, mucosa, and tongue normal. Teeth and gums normal.   Neck: Supple, symmetrical, trachea midline, no adenopathy, thyroid: no enlargment/tenderness/nodules, no carotid bruit and no JVD. Back:   Symmetric, no curvature. ROM normal. No CVA tenderness. Lungs:   Clear to auscultation bilaterally. Heart:  Regular rate and rhythm, S1, S2 normal, no murmur, click, rub or gallop. Abdomen:   Soft, non-tender. Bowel sounds normal. No masses,  No organomegaly. Extremities:  Weakness c, no cyanosis or edema. Pulses: 2+ and symmetric all extremities. Skin: Skin color, texture, turgor normal. No rashes or lesions   Lymph nodes: Cervical, supraclavicular, and axillary nodes normal.   Neurologic: CNII-XII intact. Normal strength, sensation and reflexes throughout. Lab/Data Review: All lab results for the last 24 hours reviewed.      Recent Results (from the past 24 hour(s))   CBC WITH AUTOMATED DIFF    Collection Time: 10/16/21  6:48 AM Result Value Ref Range    WBC 13.6 (H) 3.6 - 11.0 K/uL    RBC 3.25 (L) 3.80 - 5.20 M/uL    HGB 8.9 (L) 11.5 - 16.0 g/dL    HCT 28.3 (L) 35.0 - 47.0 %    MCV 87.1 80.0 - 99.0 FL    MCH 27.4 26.0 - 34.0 PG    MCHC 31.4 30.0 - 36.5 g/dL    RDW 15.7 (H) 11.5 - 14.5 %    PLATELET 582 (H) 705 - 400 K/uL    MPV 9.6 8.9 - 12.9 FL    NRBC 0.0 0.0  WBC    ABSOLUTE NRBC 0.00 0.00 - 0.01 K/uL    NEUTROPHILS 70 32 - 75 %    LYMPHOCYTES 17 12 - 49 %    MONOCYTES 8 5 - 13 %    EOSINOPHILS 4 0 - 7 %    BASOPHILS 1 0 - 1 %    IMMATURE GRANULOCYTES 0 %    ABS. NEUTROPHILS 9.6 (H) 1.8 - 8.0 K/UL    ABS. LYMPHOCYTES 2.3 0.8 - 3.5 K/UL    ABS. MONOCYTES 1.1 (H) 0.0 - 1.0 K/UL    ABS. EOSINOPHILS 0.5 (H) 0.0 - 0.4 K/UL    ABS. BASOPHILS 0.1 0.0 - 0.1 K/UL    ABS. IMM.  GRANS. 0.0 K/UL    DF Manual      RBC COMMENTS Microcytosis  1+       C REACTIVE PROTEIN, QT    Collection Time: 10/16/21  6:48 AM   Result Value Ref Range    C-Reactive protein 8.50 (H) 0.00 - 0.60 mg/dL   PROCALCITONIN    Collection Time: 10/16/21  6:48 AM   Result Value Ref Range    Procalcitonin <0.05 (H) 0 ng/mL   Rachel Nguyen    Collection Time: 10/16/21  1:04 PM   Result Value Ref Range    Vancomycin,trough 8.8 5.0 - 10.0 ug/mL    Reported dose date Not provided      Reported dose: Not provided Units         Assessment:     Active Problems:    UTI (urinary tract infection) (10/14/2021)      Sepsis ([address] (10/14/2021)    Postoperative infection  Gram-negative UTI  Plan:     Continue on antibiotics    Signed By: Mariah Mena MD     October 16, 2021

## 2021-10-17 NOTE — PROGRESS NOTES
Progress Note    Patient: Melisa Schwarz MRN: [id number]  SSN: [id number]    YOB: 1950  Age: 73 y.o. Sex: female      Admit Date: 10/14/2021    LOS: 3 days     Subjective:   Patient followed sepsis with suspected UTI and ongoing post-operative infection from spinal surgery elsewhere, secondary to MRSA. She remains afebrile with decreasing WBC. ESR and CRP elevated. Urine culture growing E. Coli and Klebsiella. Blood cultures growing Coagulase negative Staphylococcus species with mixed colony types. Currently on IV Vancomycin, Rifampin, Meropenem. Patient resting comfortably with  at bedside. No new complaints. Objective:     Vitals:    10/15/21 2121 10/16/21 2048 10/16/21 2345 10/17/21 0818   BP: (!) 180/77 (!) 152/87 (!) 161/91 (!) 192/86   Pulse: 84 85 84 74   Resp: 19 18 18 18   Temp: 97.8 °F (36.6 °C) 98 °F (36.7 °C) 97.5 °F (36.4 °C) 97.6 °F (36.4 °C)   SpO2: 96% 95% 94% 94%   Weight:  248 lb 3.8 oz (112.6 kg)     Height:            Intake and Output:  Current Shift: No intake/output data recorded. Last three shifts: 10/15 1901 - 10/17 0700  In: -   Out: 2300 [Urine:2300]    Physical Exam:   Vitals and nursing note reviewed. Constitutional:       General: She is not in acute distress. Appearance: She is obese. She is ill-appearing. Genitourinary:     Comments: Shafer catheter  Musculoskeletal:         General: Tenderness (lower back with well-healed incision) present. Cervical back: Neck supple. Skin:     Findings: No rash. Neurological:      General: No focal deficit present. Mental Status: She is alert and oriented to person, place, and time. Psychiatric:         Behavior: Behavior normal.      Lab/Data Review:     WBC 13,600    Vancomycin trough 8.8    CRP <8.50  ESR <103    Blood cultures (10/14)  Gram positive cocci in clusters in 3 of 4 bottles  Urine culture (10/14) >100,000 cfu/ml E.  Coli and Klebsiella pneumoniae sensitive to Levaquin  Assessment:     Active Problems:    UTI (urinary tract infection) (10/14/2021)      Sepsis ([address] (10/14/2021)    1. Sepsis with leukocytosis, elevated ESR, CRP  2. UTI, uncomplicated so far, with marked pyuria and bacteriuria, secondary to E. Coli and Klebsiella pneumoniae, Day #3 IV Meropenem  3. Thoracolumbar spinal surgery with MRSA post-operative infection, on Vancomycin and Rifampin  4. Positive blood cultures with Coagulase negative Staphylococci, probably contaminants  5. Abnormal CXR with lingular infiltrate, clinical significance unclear  6. Multiple antibiotic allergies including penicillins, cephalosporins, sulfa drugs, tetracycline, macrolides    Comment:  I do not think that her positive blood cultures are clinically significant. Plan:   1. Continue IV Vancomycin and Rifampin for MRSA infection  2. Continue Meropenem IV with possible transition to oral Levaquin  3. In am, repeat CBC, CRP, ESR   4. Contact her ID provider, Dr. Dayna Fajardo in [place] to discuss her spine infection  5.  Follow-up with Dr. Luh Cook at discharge (he is aware of hospitalization)    Signed By: Jus Garrett MD     October 17, 2021

## 2021-10-17 NOTE — PROGRESS NOTES
Progress Note    Patient: Norm Yates MRN: [id number]  SSN: [id number]    YOB: 1950  Age: 73 y.o. Sex: female      Admit Date: 10/14/2021    LOS: 3 days     Subjective:     73years old with infected postop wound/laminectomy on IV antibiotics and analgesia denies any pain lying comfortably in bed denies any fever or chills    Objective:     Vitals:    10/15/21 2121 10/16/21 2048 10/16/21 2345 10/17/21 0818   BP: (!) 180/77 (!) 152/87 (!) 161/91 (!) 192/86   Pulse: 84 85 84 74   Resp: 19 18 18 18   Temp: 97.8 °F (36.6 °C) 98 °F (36.7 °C) 97.5 °F (36.4 °C) 97.6 °F (36.4 °C)   SpO2: 96% 95% 94% 94%   Weight:  112.6 kg (248 lb 3.8 oz)     Height:            Intake and Output:  Current Shift: No intake/output data recorded. Last three shifts: 10/15 1901 - 10/17 0700  In: -   Out: 2300 [Urine:2300]    Physical Exam:   General:  Alert, cooperative, no distress, appears stated age. Eyes:  Conjunctivae/corneas clear. PERRL, EOMs intact. Fundi benign   Ears:  Normal TMs and external ear canals both ears. Nose: Nares normal. Septum midline. Mucosa normal. No drainage or sinus tenderness. Mouth/Throat: Lips, mucosa, and tongue normal. Teeth and gums normal.   Neck: Supple, symmetrical, trachea midline, no adenopathy, thyroid: no enlargment/tenderness/nodules, no carotid bruit and no JVD. Back:   Symmetric, no curvature. ROM normal. No CVA tenderness. Lungs:   Clear to auscultation bilaterally. Heart:  Regular rate and rhythm, S1, S2 normal, no murmur, click, rub or gallop. Abdomen:   Soft, non-tender. Bowel sounds normal. No masses,  No organomegaly. Extremities: Extremities , no cyanosis or edema. Pulses: 2+ and symmetric all extremities. Skin: Skin color, texture, turgor normal. No rashes or lesions   Lymph nodes: Cervical, supraclavicular, and axillary nodes normal.   Neurologic: CNII-XII intact. Normal strength, sensation and reflexes throughout. Lab/Data Review:   All lab results for the last 24 hours reviewed. No results found for this or any previous visit (from the past 24 hour(s)).       Assessment:     Active Problems:    UTI (urinary tract infection) (10/14/2021)      Sepsis ([address] (10/14/2021)      Infectelower back woundd   Plan:     IV antibiotics    Signed By: Meño Schaffer MD     October 17, 2021

## 2021-10-18 ENCOUNTER — APPOINTMENT (OUTPATIENT)
Dept: NON INVASIVE DIAGNOSTICS | Age: 71
DRG: 862 | End: 2021-10-18
Attending: FAMILY MEDICINE
Payer: MEDICARE

## 2021-10-18 LAB
BASOPHILS # BLD: 0.1 K/UL (ref 0–0.1)
BASOPHILS NFR BLD: 1 % (ref 0–1)
CRP SERPL-MCNC: 6.66 MG/DL (ref 0–0.6)
DIFFERENTIAL METHOD BLD: ABNORMAL
EOSINOPHIL # BLD: 0.6 K/UL (ref 0–0.4)
EOSINOPHIL NFR BLD: 5 % (ref 0–7)
ERYTHROCYTE [DISTWIDTH] IN BLOOD BY AUTOMATED COUNT: 15.3 % (ref 11.5–14.5)
ERYTHROCYTE [SEDIMENTATION RATE] IN BLOOD: 79 MM/HR
HCT VFR BLD AUTO: 28.6 % (ref 35–47)
HGB BLD-MCNC: 9.3 G/DL (ref 11.5–16)
IMM GRANULOCYTES # BLD AUTO: 0.6 K/UL (ref 0–0.04)
IMM GRANULOCYTES NFR BLD AUTO: 5 % (ref 0–0.5)
LYMPHOCYTES # BLD: 2.1 K/UL (ref 0.8–3.5)
LYMPHOCYTES NFR BLD: 17 % (ref 12–49)
MCH RBC QN AUTO: 27.4 PG (ref 26–34)
MCHC RBC AUTO-ENTMCNC: 32.5 G/DL (ref 30–36.5)
MCV RBC AUTO: 84.1 FL (ref 80–99)
MONOCYTES # BLD: 0.9 K/UL (ref 0–1)
MONOCYTES NFR BLD: 8 % (ref 5–13)
NEUTS SEG # BLD: 7.9 K/UL (ref 1.8–8)
NEUTS SEG NFR BLD: 64 % (ref 32–75)
NRBC # BLD: 0 K/UL (ref 0–0.01)
NRBC BLD-RTO: 0 PER 100 WBC
PLATELET # BLD AUTO: 758 K/UL (ref 150–400)
PMV BLD AUTO: 9.3 FL (ref 8.9–12.9)
RBC # BLD AUTO: 3.4 M/UL (ref 3.8–5.2)
WBC # BLD AUTO: 12.3 K/UL (ref 3.6–11)

## 2021-10-18 PROCEDURE — 74011636637 HC RX REV CODE- 636/637: Performed by: FAMILY MEDICINE

## 2021-10-18 PROCEDURE — 85025 COMPLETE CBC W/AUTO DIFF WBC: CPT

## 2021-10-18 PROCEDURE — 74011250637 HC RX REV CODE- 250/637: Performed by: INTERNAL MEDICINE

## 2021-10-18 PROCEDURE — 93970 EXTREMITY STUDY: CPT

## 2021-10-18 PROCEDURE — 74011250636 HC RX REV CODE- 250/636: Performed by: INTERNAL MEDICINE

## 2021-10-18 PROCEDURE — 99232 SBSQ HOSP IP/OBS MODERATE 35: CPT | Performed by: INTERNAL MEDICINE

## 2021-10-18 PROCEDURE — 74011000250 HC RX REV CODE- 250: Performed by: INTERNAL MEDICINE

## 2021-10-18 PROCEDURE — 65270000032 HC RM SEMIPRIVATE

## 2021-10-18 PROCEDURE — 36415 COLL VENOUS BLD VENIPUNCTURE: CPT

## 2021-10-18 PROCEDURE — 74011250636 HC RX REV CODE- 250/636: Performed by: FAMILY MEDICINE

## 2021-10-18 PROCEDURE — 85652 RBC SED RATE AUTOMATED: CPT

## 2021-10-18 PROCEDURE — 86140 C-REACTIVE PROTEIN: CPT

## 2021-10-18 PROCEDURE — 74011250637 HC RX REV CODE- 250/637: Performed by: FAMILY MEDICINE

## 2021-10-18 RX ADMIN — VANCOMYCIN HYDROCHLORIDE 1000 MG: 1 INJECTION, POWDER, LYOPHILIZED, FOR SOLUTION INTRAVENOUS at 18:49

## 2021-10-18 RX ADMIN — MEMANTINE HYDROCHLORIDE 10 MG: 10 TABLET ORAL at 09:13

## 2021-10-18 RX ADMIN — ESCITALOPRAM OXALATE 20 MG: 10 TABLET ORAL at 09:41

## 2021-10-18 RX ADMIN — MEMANTINE HYDROCHLORIDE 10 MG: 10 TABLET ORAL at 21:23

## 2021-10-18 RX ADMIN — DULOXETINE 60 MG: 30 CAPSULE, DELAYED RELEASE ORAL at 09:14

## 2021-10-18 RX ADMIN — CEVIMELINE 30 MG: 30 CAPSULE ORAL at 16:00

## 2021-10-18 RX ADMIN — CARVEDILOL 25 MG: 12.5 TABLET, FILM COATED ORAL at 09:14

## 2021-10-18 RX ADMIN — CEVIMELINE 30 MG: 30 CAPSULE ORAL at 22:00

## 2021-10-18 RX ADMIN — PANTOPRAZOLE SODIUM 40 MG: 40 TABLET, DELAYED RELEASE ORAL at 09:13

## 2021-10-18 RX ADMIN — LISINOPRIL 20 MG: 20 TABLET ORAL at 09:15

## 2021-10-18 RX ADMIN — RIFAMPIN 300 MG: 300 CAPSULE ORAL at 17:00

## 2021-10-18 RX ADMIN — MEROPENEM 1 G: 1 INJECTION, POWDER, FOR SOLUTION INTRAVENOUS at 17:30

## 2021-10-18 RX ADMIN — CARVEDILOL 25 MG: 12.5 TABLET, FILM COATED ORAL at 17:30

## 2021-10-18 RX ADMIN — CEVIMELINE 30 MG: 30 CAPSULE ORAL at 09:00

## 2021-10-18 RX ADMIN — LEVOTHYROXINE SODIUM 150 MCG: 0.07 TABLET ORAL at 09:15

## 2021-10-18 RX ADMIN — RIFAMPIN 300 MG: 300 CAPSULE ORAL at 09:13

## 2021-10-18 RX ADMIN — HYDROXYZINE HYDROCHLORIDE 25 MG: 50 INJECTION, SOLUTION INTRAMUSCULAR at 21:25

## 2021-10-18 RX ADMIN — PREDNISONE 5 MG: 5 TABLET ORAL at 09:15

## 2021-10-18 RX ADMIN — SODIUM CHLORIDE 75 ML/HR: 9 INJECTION, SOLUTION INTRAVENOUS at 18:00

## 2021-10-18 RX ADMIN — DONEPEZIL HYDROCHLORIDE 10 MG: 5 TABLET, FILM COATED ORAL at 21:23

## 2021-10-18 RX ADMIN — HEPARIN SODIUM 5000 UNITS: 5000 INJECTION INTRAVENOUS; SUBCUTANEOUS at 06:10

## 2021-10-18 RX ADMIN — HEPARIN SODIUM 5000 UNITS: 5000 INJECTION INTRAVENOUS; SUBCUTANEOUS at 16:00

## 2021-10-18 RX ADMIN — ACETAMINOPHEN 650 MG: 325 TABLET ORAL at 09:42

## 2021-10-18 RX ADMIN — MEROPENEM 1 G: 1 INJECTION, POWDER, FOR SOLUTION INTRAVENOUS at 02:26

## 2021-10-18 RX ADMIN — ACETAMINOPHEN 650 MG: 325 TABLET ORAL at 21:23

## 2021-10-18 RX ADMIN — MEROPENEM 1 G: 1 INJECTION, POWDER, FOR SOLUTION INTRAVENOUS at 09:12

## 2021-10-18 RX ADMIN — VANCOMYCIN HYDROCHLORIDE 1000 MG: 1 INJECTION, POWDER, LYOPHILIZED, FOR SOLUTION INTRAVENOUS at 02:26

## 2021-10-18 RX ADMIN — LISINOPRIL 20 MG: 20 TABLET ORAL at 21:23

## 2021-10-18 NOTE — PROGRESS NOTES
Progress Note    Patient: Tomma Bloch MRN: [id number]  SSN: [id number]    YOB: 1950  Age: 73 y.o. Sex: female      Admit Date: 10/14/2021    LOS: 4 days     Subjective:   Patient followed sepsis with suspected UTI and ongoing post-operative infection from spinal surgery elsewhere, secondary to MRSA. She remains afebrile with decreasing WBC,  ESR and CRP. Urine culture grew E. Coli and Klebsiella. Currently on IV Vancomycin, Rifampin, Meropenem. Patient resting comfortably with no new complaints. Objective:     Vitals:    10/17/21 1937 10/18/21 0009 10/18/21 0744 10/18/21 1036   BP: (!) 181/86 (!) 163/75 (!) 185/81 136/72   Pulse: 81 75 73 80   Resp: 18 20 20 20   Temp: 98.2 °F (36.8 °C) 97.4 °F (36.3 °C) 98.5 °F (36.9 °C) 97.4 °F (36.3 °C)   SpO2: 96% 96% 96% 97%   Weight: 247 lb 5.7 oz (112.2 kg)      Height:            Intake and Output:  Current Shift: 10/18 0701 - 10/18 1900  In: 450 [P.O.:450]  Out: 276 [Urine:275]  Last three shifts: 10/16 1901 - 10/18 0700  In: -   Out: 0521 [Urine:4350]    Physical Exam:   Vitals and nursing note reviewed. Constitutional:       General: She is not in acute distress. Appearance: She is obese. She is ill-appearing. Genitourinary:     Comments: Shafer catheter  Musculoskeletal:         General: Tenderness (lower back with well-healed incision) present. Cervical back: Neck supple. Skin:     Findings: No rash. Neurological:      General: No focal deficit present. Mental Status: She is alert and oriented to person, place, and time. Psychiatric:         Behavior: Behavior normal.      Lab/Data Review:     WBC 12,300    Vancomycin trough 8.8    CRP 6.66 <8.50  ESR 79 <103    Blood cultures (10/14)  Coagulase negative Staphylococcal species  Urine culture (10/14) >100,000 cfu/ml E.  Coli and Klebsiella pneumoniae sensitive to Levaquin  Assessment:     Active Problems:    UTI (urinary tract infection) (10/14/2021)      [facility] ([facility] (10/14/2021)    1. Sepsis with leukocytosis, elevated ESR, CRP  2. UTI, uncomplicated so far, with marked pyuria and bacteriuria, secondary to E. Coli and Klebsiella pneumoniae, Day #4 IV Meropenem  3. Thoracolumbar spinal surgery with MRSA post-operative infection, on Vancomycin and Rifampin  4. Positive blood cultures with Coagulase negative Staphylococci, probably contaminants  5. Abnormal CXR with lingular infiltrate, clinical significance unclear  6. Multiple antibiotic allergies including penicillins, cephalosporins, sulfa drugs, tetracycline, macrolides     Plan:   1. Continue IV Vancomycin and Rifampin for MRSA infection as originally directed upon admission to [facility]  2. Continue IV Meropenem 1 gm q8 for 7 more days for UTI (unable to switch to Levaquin because drug interaction with Aricept with QT prolongation)  3. In am, repeat CBC, CRP, ESR   4. Cleared for discharge from ID standpoint  5. Follow-up with her ID provider, Dr. James Hernandez in [place] for her spine infection  5.  Follow-up with Dr. Marin Cook at discharge (he is aware of hospitalization)    Signed By: Chelsi Galicia MD     October 18, 2021

## 2021-10-18 NOTE — PROGRESS NOTES
Patient accepted to return back to [facility]. Kathleen with [facility] informed CM that patient does not require PT/OT eval to return. Dr. Rhonda Gamez informed CM that patient possibly will discharge tomorrow pending ID clearance.

## 2021-10-18 NOTE — PROGRESS NOTES
Reason for Admission:  Sepsis, UTI                   RUR Score:  12%                   Plan for utilizing home health:          PCP: First and Last name:  Rommel Gilbert MD     Name of Practice:    Are you a current patient: Yes/No: Yes   Approximate date of last visit: The week before her spinal surgery in August 2021   Can you participate in a virtual visit with your PCP:                     Current Advanced Directive/Advance Care Plan: Full Code    Healthcare Decision Maker:   Click here to complete [address] including selection of the Healthcare Decision Maker Relationship (ie \"Primary\")           Dimitri Quiles- spouse: [phone]                  Transition of Care Plan:  CM called patients spouse to complete assessment. Patient admitted from [facility]. Spouse states that patient had been there for about 2-3 weeks. Spouse explained that patient had spinal surgery Aug. 27, 2021 and was discharged to a rehab facility in [place]. She was there for a few weeks before going back to the hospital with an infection. Then discharged from hospital that time to [facility]. Before patient had her spinal surgery, she used a cane/walker. Spouse states that she was primarily independent in her self-care, but he did the housekeeping, prepared meals and provided transportation. Spouse states that he wants his wife to return to [facility] upon discharge. CM sent referral.    Received telephone order from Dr. Savanah Garcia to order PT/OT.

## 2021-10-18 NOTE — PROGRESS NOTES
General Daily Progress Note          Patient Name:   Antwan Templeton       YOB: 1950       Age:  73 y.o.       Admit Date: 10/14/2021      Subjective:      Patient is a 73y.o. year old female history of CAD status post CABG recent spinal surgery at T11 present with altered mental status and pain patient with history of baseline confusion but patient was more confused for last 3 days at the rehab facility patient have a T11 vertebral surgery at 104 complicated by an infection status post washout on 9 7 came to emergency room seen by the ER physician patient WBC count was elevated and patient was recommend to be admitted with altered mental status possible infection    Patient is alert awake's on eating drinking well           Objective:     Visit Vitals  /72 (BP 1 Location: Left upper arm)   Pulse 80   Temp 97.4 °F (36.3 °C)   Resp 20   Ht 5' 8\" (1.727 m)   Wt 112.2 kg (247 lb 5.7 oz)   SpO2 97%   Breastfeeding No   BMI 37.61 kg/m²        Recent Results (from the past 24 hour(s))   VANCOMYCIN, TROUGH    Collection Time: 10/17/21  4:15 PM   Result Value Ref Range    Vancomycin,trough 13.8 (H) 5.0 - 10.0 ug/mL    Reported dose date Dose Dependent      Reported dose: Dose Dependent Units   SED RATE (ESR)    Collection Time: 10/18/21  8:13 AM   Result Value Ref Range    Sed rate, automated 79 mm/hr   C REACTIVE PROTEIN, QT    Collection Time: 10/18/21  8:13 AM   Result Value Ref Range    C-Reactive protein 6.66 (H) 0.00 - 0.60 mg/dL   CBC WITH AUTOMATED DIFF    Collection Time: 10/18/21  8:13 AM   Result Value Ref Range    WBC 12.3 (H) 3.6 - 11.0 K/uL    RBC 3.40 (L) 3.80 - 5.20 M/uL    HGB 9.3 (L) 11.5 - 16.0 g/dL    HCT 28.6 (L) 35.0 - 47.0 %    MCV 84.1 80.0 - 99.0 FL    MCH 27.4 26.0 - 34.0 PG    MCHC 32.5 30.0 - 36.5 g/dL    RDW 15.3 (H) 11.5 - 14.5 %    PLATELET 372 (H) 622 - 400 K/uL    MPV 9.3 8.9 - 12.9 FL    NRBC 0.0 0.0  WBC    ABSOLUTE NRBC 0.00 0.00 - 0.01 K/uL    NEUTROPHILS 64 32 - 75 %    LYMPHOCYTES 17 12 - 49 %    MONOCYTES 8 5 - 13 %    EOSINOPHILS 5 0 - 7 %    BASOPHILS 1 0 - 1 %    IMMATURE GRANULOCYTES 5 (H) 0 - 0.5 %    ABS. NEUTROPHILS 7.9 1.8 - 8.0 K/UL    ABS. LYMPHOCYTES 2.1 0.8 - 3.5 K/UL    ABS. MONOCYTES 0.9 0.0 - 1.0 K/UL    ABS. EOSINOPHILS 0.6 (H) 0.0 - 0.4 K/UL    ABS. BASOPHILS 0.1 0.0 - 0.1 K/UL    ABS. IMM. GRANS. 0.6 (H) 0.00 - 0.04 K/UL    DF AUTOMATED       [unfilled]      Review of Systems    Constitutional: Negative for chills and fever. HENT: Negative. Eyes: Negative. Respiratory: Negative. Cardiovascular: Negative. Gastrointestinal: Negative for abdominal pain and nausea. Skin: Negative. Neurological: Negative. Physical Exam:      Constitutional: pt is oriented to person, place, and time. HENT:   Head: Normocephalic and atraumatic. Eyes: Pupils are equal, round, and reactive to light. EOM are normal.   Cardiovascular: Normal rate, regular rhythm and normal heart sounds. Pulmonary/Chest: Breath sounds normal. No wheezes. No rales. Exhibits no tenderness. Abdominal: Soft. Bowel sounds are normal. There is no abdominal tenderness. There is no rebound and no guarding. Musculoskeletal: Normal range of motion. Neurological: pt is alert and oriented to person, place, and time. CT ABD PELV W CONT   Final Result   No acute findings. Constipation      XR CHEST PORT   Final Result   1. Possible lingular airspace opacity could represent pneumonia or atelectasis. 2. Mild interstitial pulmonary edema. CT HEAD WO CONT   Final Result   No acute intracranial abnormality.            Recent Results (from the past 24 hour(s))   VANCOMYCIN, TROUGH    Collection Time: 10/17/21  4:15 PM   Result Value Ref Range    Vancomycin,trough 13.8 (H) 5.0 - 10.0 ug/mL    Reported dose date Dose Dependent      Reported dose: Dose Dependent Units   SED RATE (ESR)    Collection Time: 10/18/21  8:13 AM   Result Value Ref Range    Sed rate, automated 79 mm/hr   C REACTIVE PROTEIN, QT    Collection Time: 10/18/21  8:13 AM   Result Value Ref Range    C-Reactive protein 6.66 (H) 0.00 - 0.60 mg/dL   CBC WITH AUTOMATED DIFF    Collection Time: 10/18/21  8:13 AM   Result Value Ref Range    WBC 12.3 (H) 3.6 - 11.0 K/uL    RBC 3.40 (L) 3.80 - 5.20 M/uL    HGB 9.3 (L) 11.5 - 16.0 g/dL    HCT 28.6 (L) 35.0 - 47.0 %    MCV 84.1 80.0 - 99.0 FL    MCH 27.4 26.0 - 34.0 PG    MCHC 32.5 30.0 - 36.5 g/dL    RDW 15.3 (H) 11.5 - 14.5 %    PLATELET 759 (H) 265 - 400 K/uL    MPV 9.3 8.9 - 12.9 FL    NRBC 0.0 0.0  WBC    ABSOLUTE NRBC 0.00 0.00 - 0.01 K/uL    NEUTROPHILS 64 32 - 75 %    LYMPHOCYTES 17 12 - 49 %    MONOCYTES 8 5 - 13 %    EOSINOPHILS 5 0 - 7 %    BASOPHILS 1 0 - 1 %    IMMATURE GRANULOCYTES 5 (H) 0 - 0.5 %    ABS. NEUTROPHILS 7.9 1.8 - 8.0 K/UL    ABS. LYMPHOCYTES 2.1 0.8 - 3.5 K/UL    ABS. MONOCYTES 0.9 0.0 - 1.0 K/UL    ABS. EOSINOPHILS 0.6 (H) 0.0 - 0.4 K/UL    ABS. BASOPHILS 0.1 0.0 - 0.1 K/UL    ABS. IMM. GRANS. 0.6 (H) 0.00 - 0.04 K/UL    DF AUTOMATED         Results     Procedure Component Value Units Date/Time    CULTURE, BLOOD #1 [[id number]]     Order Status: Canceled Specimen: Blood     CULTURE, BLOOD #2 [[id number]]     Order Status: Canceled Specimen: Blood     COVID-19 RAPID TEST [[id number]] Collected: 10/14/21 9722    Order Status: Completed Specimen: Nasopharyngeal Updated: 10/14/21 2016     Specimen source Nasopharyngeal        COVID-19 rapid test Not Detected        Comment: Rapid [employer] ID Now   Rapid NAAT:  The specimen is NEGATIVE for SARS-CoV-2, the novel coronavirus associated with COVID-19. Negative results should be treated as presumptive and, if inconsistent with clinical signs and symptoms or necessary for patient management, should be tested with an alternative molecular assay. Negative results do not preclude SARS-CoV-2 infection and should not be used as the sole basis for patient management decisions.    This test has been authorized by the FDA under   an Emergency Use Authorization (EUA) for use by authorized laboratories. Fact sheet for Healthcare Providers: ConventionUpdate.co.nz Fact sheet for Patients: ConventionUpdate.co.nz   Methodology: Isothermal Nucleic Acid Amplification         CULTURE, BLOOD, PAIRED [[id number]]  (Abnormal) Collected: 10/14/21 1700    Order Status: Completed Specimen: Blood Updated: 10/17/21 1250     Special Requests: Blood        Culture result:       Staphylococcus species, coagulase negative POSSIBLE MIXED COLONY TYPES growing in all 4 bottles drawn NO SITE INDICATED                  preliminary report of Gram Positive Cocci in clusters growing in 3 of 4 bottles drawn CALLED TO AND READ BACK BY [facility] ON 10/16/21 AT 1512. SH          CULTURE, URINE [[id number]]  (Abnormal)  (Susceptibility) Collected: 10/14/21 1700    Order Status: Completed Specimen: Urine Updated: 10/17/21 1121     Special Requests: --        No Special Requests  Reflexed from [id number]       Branch Count --        >100,000  colonies/ml       Culture result: Escherichia coli         Klebsiella pneumoniae       Susceptibility      Escherichia coli Klebsiella pneumoniae      RANULFO RANULFO      Amikacin ($) Susceptible Susceptible      Ampicillin ($) Resistant Resistant      Ampicillin/sulbactam ($) Intermediate Susceptible      Cefazolin ($) Susceptible Susceptible      Cefepime ($$) Susceptible Susceptible      Cefoxitin Susceptible Susceptible      Ceftazidime ($) Susceptible Susceptible      Ceftriaxone ($) Susceptible Susceptible      Ciprofloxacin ($) Susceptible Susceptible      Gentamicin ($) Susceptible Susceptible      Levofloxacin ($) Susceptible Susceptible      Meropenem ($$) Susceptible Susceptible      Nitrofurantoin Susceptible Susceptible      Piperacillin/Tazobac ($) Susceptible Susceptible      Tobramycin ($) Susceptible Susceptible      Trimeth/Sulfa Susceptible Susceptible Linear View                          Labs:     Recent Labs     10/18/21  0813 10/16/21  0648   WBC 12.3* 13.6*   HGB 9.3* 8.9*   HCT 28.6* 28.3*   * 743*     No results for input(s): NA, K, CL, CO2, BUN, CREA, GLU, CA, MG, PHOS, URICA in the last 72 hours. No results for input(s): ALT, AP, TBIL, TBILI, TP, ALB, GLOB, GGT, AML, LPSE in the last 72 hours. No lab exists for component: SGOT, GPT, AMYP, HLPSE  No results for input(s): INR, PTP, APTT, INREXT in the last 72 hours. No results for input(s): FE, TIBC, PSAT, FERR in the last 72 hours. No results found for: FOL, RBCF   No results for input(s): PH, PCO2, PO2 in the last 72 hours. No results for input(s): CPK, CKNDX, TROIQ in the last 72 hours.     No lab exists for component: CPKMB  No results found for: CHOL, CHOLX, CHLST, CHOLV, HDL, HDLP, LDL, LDLC, DLDLP, TGLX, TRIGL, TRIGP, CHHD, CHHDX  No results found for: [facility]  Lab Results   Component Value Date/Time    Color Amy 10/14/2021 05:00 PM    Appearance Turbid (A) 10/14/2021 05:00 PM    Specific gravity 1.024 10/14/2021 05:00 PM    pH (UA) 5.0 10/14/2021 05:00 PM    Protein 100 (A) 10/14/2021 05:00 PM    Glucose Negative 10/14/2021 05:00 PM    Ketone 5 (A) 10/14/2021 05:00 PM    Bilirubin Negative 10/14/2021 05:00 PM    Urobilinogen 0.1 10/14/2021 05:00 PM    Nitrites Negative 10/14/2021 05:00 PM    Leukocyte Esterase Moderate (A) 10/14/2021 05:00 PM    Bacteria 4+ (A) 10/14/2021 05:00 PM    WBC  10/14/2021 05:00 PM    RBC  10/14/2021 05:00 PM         Assessment:          Altered mental status  Metabolic encephalopathy  Leukocytosis  UTI  History of CAD status post CABG  Recent spinal surgery at Q10 complicated by infection status post washout  Hypertension  Dementia  Depression  Hypothyroidism  GERD  Sepsis with UTI E. coli Klebsiella pneumonia  Thoracolumbar r spinal surgery with MRSA postoperative infection on vancomycin and rifampin      Plan:       Continue current medication on IV vancomycin and IV meropenem    We will discuss with infectious disease possible discharge home on IV antibiotic      Current Facility-Administered Medications:     [START ON 10/19/2021] Vancomycin trough level - please draw prior to dose on 10/19 @ 1000.  Thanks!, , Other, ONCE, Rick Chicas MD    lisinopriL (PRINIVIL, ZESTRIL) tablet 20 mg, 20 mg, Oral, BID, Mike Sargent MD, 20 mg at 10/18/21 0915    carvediloL (COREG) tablet 25 mg, 25 mg, Oral, BID WITH MEALS, Mike Sargent MD, 25 mg at 10/18/21 0914    vancomycin (VANCOCIN) 1,000 mg in 0.9% sodium chloride 250 mL (VIAL-MATE), 1,000 mg, IntraVENous, Q8H, Charmayne Kapur, MD, Last Rate: 250 mL/hr at 10/18/21 0226, 1,000 mg at 10/18/21 0226    meropenem (MERREM) 1 g in sterile water (preservative free) 20 mL IV syringe, 1 g, IntraVENous, Q8H, Charmayne Kapur, MD, 1 g at 10/18/21 0912    albuterol (PROVENTIL HFA, VENTOLIN HFA, PROAIR HFA) inhaler 2 Puff, 2 Puff, Inhalation, Q6H PRN, Rick Chicas MD    [facility]) capsule 30 mg, 30 mg, Oral, TID, Rick Chicas MD, 30 mg at 10/18/21 0900    donepeziL (ARICEPT) tablet 10 mg, 10 mg, Oral, QHS, Rick Chicas MD, 10 mg at 10/17/21 2201    DULoxetine (CYMBALTA) capsule 60 mg, 60 mg, Oral, DAILY, Rick Chicas MD, 60 mg at 10/18/21 0914    escitalopram oxalate (LEXAPRO) tablet 20 mg, 20 mg, Oral, DAILY, Rick Chicas MD, 20 mg at 10/18/21 0941    levothyroxine (SYNTHROID) tablet 150 mcg, 150 mcg, Oral, ACB, Rick Chicas MD, 150 mcg at 10/18/21 0915    memantine (NAMENDA) tablet 10 mg, 10 mg, Oral, BID, Rick Chicas MD, 10 mg at 10/18/21 0913    pantoprazole (PROTONIX) tablet 40 mg, 40 mg, Oral, DAILY, Rick Chicas MD, 40 mg at 10/18/21 0913    predniSONE (DELTASONE) tablet 5 mg, 5 mg, Oral, DAILY WITH BREAKFAST, Tristan Dalal MD, 5 mg at 10/18/21 0915    rifAMPin (RIFADIN) capsule 300 mg, 300 mg, Oral, ACB&D, Tristan Dalal MD, 300 mg at 10/18/21 0913    0.9% sodium chloride infusion, 75 mL/hr, IntraVENous, CONTINUOUS, Rick Chicas MD, Last Rate: 75 mL/hr at 10/16/21 1141, 75 mL/hr at 10/16/21 1141    acetaminophen (TYLENOL) tablet 650 mg, 650 mg, Oral, Q6H PRN, 650 mg at 10/18/21 0942 **OR** acetaminophen (TYLENOL) suppository 650 mg, 650 mg, Rectal, Q6H PRN, Zaida Chicas MD    polyethylene glycol (MIRALAX) packet 17 g, 17 g, Oral, DAILY PRN, Rick Chicas MD    ondansetron (ZOFRAN ODT) tablet 4 mg, 4 mg, Oral, Q8H PRN **OR** ondansetron (ZOFRAN) injection 4 mg, 4 mg, IntraVENous, Q6H PRN, Rick Chicas MD    heparin (porcine) injection 5,000 Units, 5,000 Units, SubCUTAneous, Q8H, Rick Chicas MD, 5,000 Units at 10/18/21 0610    Vancomycin Pharmacy Dosing, , Other, Rx Dosing/Monitoring, Rick Chicas MD    hydrOXYzine (VISTARIL) injection 25 mg, 25 mg, IntraMUSCular, Q6H PRN, Tristan Dalal MD, 25 mg at 10/15/21 8319

## 2021-10-18 NOTE — PROGRESS NOTES
Patient's BP has been steadily high all day despite getting scheduled meds for HTN. Dr. Kobi Mortensen notified of this and orders were received to modify her doses of Lisinopril and Coreg and give a now dose of the Coreg tonight.

## 2021-10-19 LAB
ALBUMIN SERPL-MCNC: 2 G/DL (ref 3.5–5)
ALBUMIN/GLOB SERPL: 0.4 {RATIO} (ref 1.1–2.2)
ALP SERPL-CCNC: 113 U/L (ref 45–117)
ALT SERPL-CCNC: 12 U/L (ref 12–78)
ANION GAP SERPL CALC-SCNC: 11 MMOL/L (ref 5–15)
AST SERPL W P-5'-P-CCNC: 14 U/L (ref 15–37)
BACTERIA SPEC CULT: ABNORMAL
BACTERIA SPEC CULT: ABNORMAL
BASOPHILS # BLD: 0.1 K/UL (ref 0–0.1)
BASOPHILS NFR BLD: 1 % (ref 0–1)
BILIRUB SERPL-MCNC: 0.4 MG/DL (ref 0.2–1)
BUN SERPL-MCNC: 4 MG/DL (ref 6–20)
BUN/CREAT SERPL: 14 (ref 12–20)
CA-I BLD-MCNC: 8.2 MG/DL (ref 8.5–10.1)
CHLORIDE SERPL-SCNC: 86 MMOL/L (ref 97–108)
CO2 SERPL-SCNC: 21 MMOL/L (ref 21–32)
CREAT SERPL-MCNC: 0.29 MG/DL (ref 0.55–1.02)
CRP SERPL-MCNC: 7.17 MG/DL (ref 0–0.6)
DATE LAST DOSE: ABNORMAL
DIFFERENTIAL METHOD BLD: ABNORMAL
EOSINOPHIL # BLD: 0.5 K/UL (ref 0–0.4)
EOSINOPHIL NFR BLD: 4 % (ref 0–7)
ERYTHROCYTE [DISTWIDTH] IN BLOOD BY AUTOMATED COUNT: 15.2 % (ref 11.5–14.5)
ERYTHROCYTE [SEDIMENTATION RATE] IN BLOOD: 85 MM/HR
GLOBULIN SER CALC-MCNC: 4.5 G/DL (ref 2–4)
GLUCOSE SERPL-MCNC: 76 MG/DL (ref 65–100)
HCT VFR BLD AUTO: 28.4 % (ref 35–47)
HGB BLD-MCNC: 9.4 G/DL (ref 11.5–16)
IMM GRANULOCYTES # BLD AUTO: 0.5 K/UL (ref 0–0.04)
IMM GRANULOCYTES NFR BLD AUTO: 4 % (ref 0–0.5)
LYMPHOCYTES # BLD: 2.3 K/UL (ref 0.8–3.5)
LYMPHOCYTES NFR BLD: 18 % (ref 12–49)
MCH RBC QN AUTO: 27.5 PG (ref 26–34)
MCHC RBC AUTO-ENTMCNC: 33.1 G/DL (ref 30–36.5)
MCV RBC AUTO: 83 FL (ref 80–99)
MONOCYTES # BLD: 0.9 K/UL (ref 0–1)
MONOCYTES NFR BLD: 7 % (ref 5–13)
NEUTS SEG # BLD: 8.6 K/UL (ref 1.8–8)
NEUTS SEG NFR BLD: 66 % (ref 32–75)
NRBC # BLD: 0 K/UL (ref 0–0.01)
NRBC BLD-RTO: 0 PER 100 WBC
PLATELET # BLD AUTO: 788 K/UL (ref 150–400)
PMV BLD AUTO: 8.9 FL (ref 8.9–12.9)
POTASSIUM SERPL-SCNC: 4.1 MMOL/L (ref 3.5–5.1)
PROT SERPL-MCNC: 6.5 G/DL (ref 6.4–8.2)
RBC # BLD AUTO: 3.42 M/UL (ref 3.8–5.2)
REPORTED DOSE,DOSE: ABNORMAL UNITS
SODIUM SERPL-SCNC: 118 MMOL/L (ref 136–145)
SPECIAL REQUESTS,SREQ: ABNORMAL
VANCOMYCIN TROUGH SERPL-MCNC: 31.2 UG/ML (ref 5–10)
WBC # BLD AUTO: 12.8 K/UL (ref 3.6–11)

## 2021-10-19 PROCEDURE — 80053 COMPREHEN METABOLIC PANEL: CPT

## 2021-10-19 PROCEDURE — 99232 SBSQ HOSP IP/OBS MODERATE 35: CPT | Performed by: INTERNAL MEDICINE

## 2021-10-19 PROCEDURE — 74011250637 HC RX REV CODE- 250/637

## 2021-10-19 PROCEDURE — 97530 THERAPEUTIC ACTIVITIES: CPT

## 2021-10-19 PROCEDURE — 93970 EXTREMITY STUDY: CPT | Performed by: SURGERY

## 2021-10-19 PROCEDURE — 85025 COMPLETE CBC W/AUTO DIFF WBC: CPT

## 2021-10-19 PROCEDURE — 97161 PT EVAL LOW COMPLEX 20 MIN: CPT

## 2021-10-19 PROCEDURE — 80202 ASSAY OF VANCOMYCIN: CPT

## 2021-10-19 PROCEDURE — 74011250637 HC RX REV CODE- 250/637: Performed by: INTERNAL MEDICINE

## 2021-10-19 PROCEDURE — 74011250636 HC RX REV CODE- 250/636: Performed by: FAMILY MEDICINE

## 2021-10-19 PROCEDURE — 74011636637 HC RX REV CODE- 636/637: Performed by: FAMILY MEDICINE

## 2021-10-19 PROCEDURE — 74011250637 HC RX REV CODE- 250/637: Performed by: FAMILY MEDICINE

## 2021-10-19 PROCEDURE — 74011000250 HC RX REV CODE- 250: Performed by: INTERNAL MEDICINE

## 2021-10-19 PROCEDURE — 85652 RBC SED RATE AUTOMATED: CPT

## 2021-10-19 PROCEDURE — 65270000032 HC RM SEMIPRIVATE

## 2021-10-19 PROCEDURE — 74011250636 HC RX REV CODE- 250/636: Performed by: INTERNAL MEDICINE

## 2021-10-19 PROCEDURE — 36415 COLL VENOUS BLD VENIPUNCTURE: CPT

## 2021-10-19 PROCEDURE — 86140 C-REACTIVE PROTEIN: CPT

## 2021-10-19 RX ORDER — LEVOTHYROXINE SODIUM 75 UG/1
TABLET ORAL
Status: COMPLETED
Start: 2021-10-19 | End: 2021-10-19

## 2021-10-19 RX ORDER — HYDRALAZINE HYDROCHLORIDE 25 MG/1
25 TABLET, FILM COATED ORAL 3 TIMES DAILY
Status: DISCONTINUED | OUTPATIENT
Start: 2021-10-19 | End: 2021-10-20

## 2021-10-19 RX ORDER — SODIUM CHLORIDE TAB 1 GM 1 G
1 TAB MISCELLANEOUS
Status: DISCONTINUED | OUTPATIENT
Start: 2021-10-19 | End: 2021-10-21 | Stop reason: HOSPADM

## 2021-10-19 RX ORDER — TRAMADOL HYDROCHLORIDE 50 MG/1
50 TABLET ORAL
Status: DISCONTINUED | OUTPATIENT
Start: 2021-10-19 | End: 2021-10-21 | Stop reason: HOSPADM

## 2021-10-19 RX ORDER — RIFAMPIN 300 MG/1
CAPSULE ORAL
Status: COMPLETED
Start: 2021-10-19 | End: 2021-10-19

## 2021-10-19 RX ADMIN — HYDRALAZINE HYDROCHLORIDE 25 MG: 25 TABLET, FILM COATED ORAL at 22:03

## 2021-10-19 RX ADMIN — CARVEDILOL 25 MG: 12.5 TABLET, FILM COATED ORAL at 17:03

## 2021-10-19 RX ADMIN — DULOXETINE 60 MG: 30 CAPSULE, DELAYED RELEASE ORAL at 09:42

## 2021-10-19 RX ADMIN — PANTOPRAZOLE SODIUM 40 MG: 40 TABLET, DELAYED RELEASE ORAL at 09:44

## 2021-10-19 RX ADMIN — ESCITALOPRAM OXALATE 20 MG: 10 TABLET ORAL at 09:47

## 2021-10-19 RX ADMIN — HYDROXYZINE HYDROCHLORIDE 25 MG: 50 INJECTION, SOLUTION INTRAMUSCULAR at 23:56

## 2021-10-19 RX ADMIN — MEROPENEM 1 G: 1 INJECTION, POWDER, FOR SOLUTION INTRAVENOUS at 09:44

## 2021-10-19 RX ADMIN — TRAMADOL HYDROCHLORIDE 50 MG: 50 TABLET, FILM COATED ORAL at 09:44

## 2021-10-19 RX ADMIN — RIFAMPIN 300 MG: 300 CAPSULE ORAL at 17:03

## 2021-10-19 RX ADMIN — MEMANTINE HYDROCHLORIDE 10 MG: 10 TABLET ORAL at 09:45

## 2021-10-19 RX ADMIN — LISINOPRIL 20 MG: 20 TABLET ORAL at 09:44

## 2021-10-19 RX ADMIN — LEVOTHYROXINE SODIUM 150 MCG: 0.07 TABLET ORAL at 09:43

## 2021-10-19 RX ADMIN — HYDRALAZINE HYDROCHLORIDE 25 MG: 25 TABLET, FILM COATED ORAL at 17:03

## 2021-10-19 RX ADMIN — RIFAMPIN 300 MG: 300 CAPSULE ORAL at 09:43

## 2021-10-19 RX ADMIN — CEVIMELINE 30 MG: 30 CAPSULE ORAL at 09:00

## 2021-10-19 RX ADMIN — CARVEDILOL 25 MG: 12.5 TABLET, FILM COATED ORAL at 09:42

## 2021-10-19 RX ADMIN — HYDRALAZINE HYDROCHLORIDE 25 MG: 25 TABLET, FILM COATED ORAL at 09:44

## 2021-10-19 RX ADMIN — Medication 1 G: at 12:49

## 2021-10-19 RX ADMIN — CEVIMELINE 30 MG: 30 CAPSULE ORAL at 16:00

## 2021-10-19 RX ADMIN — MEMANTINE HYDROCHLORIDE 10 MG: 10 TABLET ORAL at 22:03

## 2021-10-19 RX ADMIN — TRAMADOL HYDROCHLORIDE 50 MG: 50 TABLET, FILM COATED ORAL at 15:45

## 2021-10-19 RX ADMIN — HEPARIN SODIUM 5000 UNITS: 5000 INJECTION INTRAVENOUS; SUBCUTANEOUS at 06:02

## 2021-10-19 RX ADMIN — TRAMADOL HYDROCHLORIDE 50 MG: 50 TABLET, FILM COATED ORAL at 22:12

## 2021-10-19 RX ADMIN — VANCOMYCIN HYDROCHLORIDE 1000 MG: 1 INJECTION, POWDER, LYOPHILIZED, FOR SOLUTION INTRAVENOUS at 01:45

## 2021-10-19 RX ADMIN — DONEPEZIL HYDROCHLORIDE 10 MG: 5 TABLET, FILM COATED ORAL at 22:03

## 2021-10-19 RX ADMIN — PREDNISONE 5 MG: 5 TABLET ORAL at 09:44

## 2021-10-19 RX ADMIN — HEPARIN SODIUM 5000 UNITS: 5000 INJECTION INTRAVENOUS; SUBCUTANEOUS at 22:04

## 2021-10-19 RX ADMIN — CEVIMELINE 30 MG: 30 CAPSULE ORAL at 22:00

## 2021-10-19 RX ADMIN — SODIUM CHLORIDE 1000 MG: 9 INJECTION, SOLUTION INTRAVENOUS at 17:04

## 2021-10-19 RX ADMIN — MEROPENEM 1 G: 1 INJECTION, POWDER, FOR SOLUTION INTRAVENOUS at 01:45

## 2021-10-19 RX ADMIN — HEPARIN SODIUM 5000 UNITS: 5000 INJECTION INTRAVENOUS; SUBCUTANEOUS at 14:19

## 2021-10-19 RX ADMIN — LISINOPRIL 20 MG: 20 TABLET ORAL at 22:03

## 2021-10-19 RX ADMIN — VANCOMYCIN HYDROCHLORIDE 1000 MG: 1 INJECTION, POWDER, LYOPHILIZED, FOR SOLUTION INTRAVENOUS at 09:45

## 2021-10-19 RX ADMIN — Medication 1 G: at 17:04

## 2021-10-19 RX ADMIN — MEROPENEM 1 G: 1 INJECTION, POWDER, FOR SOLUTION INTRAVENOUS at 17:04

## 2021-10-19 NOTE — PROGRESS NOTES
Progress Note    Patient: Ayana Alicea MRN: [id number]  SSN: [id number]    YOB: 1950  Age: 73 y.o. Sex: female      Admit Date: 10/14/2021    LOS: 5 days     Subjective:   Patient followed sepsis with suspected UTI and ongoing post-operative infection from spinal surgery elsewhere, secondary to MRSA. She remains afebrile with decreasing WBC,  ESR and CRP. Urine culture grew E. Coli and Klebsiella. Currently on IV Vancomycin, Rifampin, Meropenem. Patient resting comfortably with no new complaints. Apparently with severe hyponatremia today.  at bedside. Objective:     Vitals:    10/18/21 1036 10/18/21 2121 10/19/21 0417 10/19/21 0856   BP: 136/72 (!) 171/77 (!) 171/76 (!) 201/103   Pulse: 80 72 77 82   Resp: 20 20 22   Temp: 97.4 °F (36.3 °C) 97.2 °F (36.2 °C)  97.4 °F (36.3 °C)   SpO2: 97% 97%  97%   Weight:       Height:            Intake and Output:  Current Shift: 10/19 0701 - 10/19 1900  In: -   Out: 750 [Urine:750]  Last three shifts: 10/17 1901 - 10/19 0700  In: 450 [P.O.:450]  Out: 3176 [Urine:3175]    Physical Exam:   Vitals and nursing note reviewed. Constitutional:       General: She is not in acute distress. Appearance: She is obese. She is ill-appearing. Genitourinary:     Comments: Shafer catheter  Musculoskeletal:         General: Tenderness (lower back with well-healed incision) present. Cervical back: Neck supple. Skin:     Findings: No rash. Neurological:      General: No focal deficit present. Mental Status: She is alert and oriented to person, place, and time. Psychiatric:         Behavior: Behavior normal.      Lab/Data Review:     WBC 12,800    Vancomycin trough 8.8    CRP 7.17 <6.66 <8.50  ESR 79 <103    Blood cultures (10/14)  Coagulase negative Staphylococcal species  Urine culture (10/14) >100,000 cfu/ml E.  Coli and Klebsiella pneumoniae sensitive to Levaquin  Assessment:     Active Problems:    UTI (urinary tract infection) (10/14/2021)      Sepsis ([facility] [address] (10/14/2021)    1. Sepsis with leukocytosis, elevated ESR, CRP  2. UTI, uncomplicated so far, with marked pyuria and bacteriuria, secondary to E. Coli and Klebsiella pneumoniae, Day #6/10 IV Meropenem  3. Thoracolumbar spinal surgery with MRSA post-operative infection, on Vancomycin and Rifampin  4. Positive blood cultures with Coagulase negative Staphylococci, probably contaminants  5. Abnormal CXR with lingular infiltrate, clinical significance unclear  6. Multiple antibiotic allergies including penicillins, cephalosporins, sulfa drugs, tetracycline, macrolides     Comment:  WBC increased slightly but she is on Prednisone, however, CRP also increased. Rule out possible Candida superinfection. Plan:   1. Continue IV Vancomycin and Rifampin for MRSA infection as originally directed upon admission to [facility]  2. Continue IV Meropenem 1 gm q8 for 4 more days for UTI (unable to switch to Levaquin because drug interaction with Aricept with QT prolongation)  3. In am, repeat CBC, CRP, ESR   4. Repeat urinalysis  5. Follow-up with her ID provider, Dr. Montse Blackwell [phone], in [place] for her spine infection (Appt scheduled for Oct 26 at 2:20 pm)   6. Follow-up with Dr. Cool Settleparis at discharge (he is aware of hospitalization)  7.  Cleared for discharge to [facility] from [facility] By: Bhavesh Sweeney MD     October 19, 2021

## 2021-10-19 NOTE — PROGRESS NOTES
Episode of wide complex tachycardia (WCT) per telemetry monitoring while turning and giving bedside clay/aguilar care. Dr. Rhonda Gamez notified of single WCT event and indicated if WCT occurs again to draw Mg+ and K+ levels, and get an EKG.

## 2021-10-19 NOTE — PROGRESS NOTES
CM spoke with Dr. Toñito Randolph and patient is not ready to DC. DC plan: Return to [facility] and [address] when medically stable.

## 2021-10-19 NOTE — ROUTINE PROCESS
Bedside and Verbal shift change report given to Benito Woo (oncoming nurse) by Juana Rhoades (offgoing nurse). Report included the following information SBAR and MAR.

## 2021-10-19 NOTE — PROGRESS NOTES
Problem: Mobility Impaired (Adult and Pediatric)  Goal: *Acute Goals and Plan of Care (Insert Text)  Description: Physical Therapy Goals  Initiated 10/19/21  1)  Pt to participate in 3-5 LE exercises in 7 days to improve overall functional mobility. 2)  Bed mobility with min A in 7 days to prevent skin breakdown. 3)  Pt to sit EOB x 10 min with good sitting balance to prepare for transfers. Pt. Goal:  Pt to be able to transfer safely to the chair. Outcome: Not Met  PHYSICAL THERAPY EVALUATION  Patient: Antwan Templeton (73 y.o. female)  Date: 10/19/2021  Primary Diagnosis: UTI (urinary tract infection) [N39.0]  Sepsis ([address] [A41.9]        Precautions: fall       ASSESSMENT  Pt admitted from [address] [place] [address], UTI and metabolic encephalopathy from [facility]. Pt with recent spinal surgery (T12-L5 fusion) on 8/27 and s/p infection with washout procedure on 9/7/21. Pt with hx of CAD s/p CABG, GERD, HTN. Per case management note \"spouse states that patient had been there ([address] [place] [address]) for about 2-3 weeks. Spouse explained that patient had spinal surgery Aug. 27, 2021 and was discharged to a rehab facility in [facility]. She was there for a few weeks before going back to the hospital with an infection. Then discharged from hospital that time to [facility]. Before patient had her spinal surgery, she used a cane/walker. Spouse states that she was primarily independent in her self-care, but he did the housekeeping, prepared meals and provided transportation. Spouse states that he wants his wife to return to [facility] upon discharge. Pt alert to self and time today but stated she was at a nursing facility not at the hospital.  Pt very drowsy today and having difficulty answering PLOF questions.   Based on the objective data described below, the patient presents with decreased LE strength, impaired balance, difficulty with bed mobility requiring max-total A and unable to perform transfers today due to requiring max A to sit EOB due to strong posterior lean due to back pain. Pt sat up for about 4 min before having to lay back down in the bed. Pt was instructed in log rolling technique to get in and out of bed and pt demos understanding. Pt would benefit from skilled PT services to improve LE strength, balance, overall functional mobility to ease the burden on her caregivers. Recommend d/c back to SNF. Other factors to consider for discharge: impaired mobility, level of assist     Patient will benefit from skilled therapy intervention to address the above noted impairments. PLAN :  Recommendations and Planned Interventions: bed mobility training, transfer training, therapeutic exercises, patient and family training/education and therapeutic activities      Frequency/Duration: Patient will be followed by physical therapy:  2-3x/week to address goals. Recommendation for discharge: (in order for the patient to meet his/her long term goals)  Remington Veliz    This discharge recommendation:  Has been made in collaboration with the attending provider and/or case management    IF patient discharges home will need the following DME: none         SUBJECTIVE:   Patient stated I need to lay back down.     OBJECTIVE DATA SUMMARY:   HISTORY:    Past Medical History:   Diagnosis Date    Dorsalgia     Intervertebral disc disease     Spinal stenosis    No past surgical history on file. Personal factors and/or comorbidities impacting plan of care: impaired mobility, level of assist    Home Situation  Home Environment: [address] [place] [address] Name: [facility]  One/Two Story Residence: One story  Living Alone: No  Support Systems: Spouse/Significant Other, Remington Veliz  Patient Expects to be Discharged to[de-identified] Long-term care  Current DME Used/Available at Home: None    PLOF: Pt A for ADLS/IADLS, MI with mobility prior to admission. EXAMINATION/PRESENTATION/DECISION MAKING:   Critical Behavior:  Neurologic State: Drowsy, Confused  Orientation Level: Oriented X4  Cognition: Follows commands     Hearing: Auditory  Auditory Impairment: None    Range Of Motion:  AROM: Generally decreased, functional                       Strength:    Strength: Generally decreased, functional          Right Left   HIP FLEXION 0/5 0/5   HIP EXTENSION     HIP ABDUCTION     HIP ADDUCTION     KNEE FLEXION 2/5 2/5   KNEE EXTENSION 2+/5 2+/5   ANKLE PF  3-/5 3-/5   ANKLE DF 3-/5 3-/5                    Functional Mobility:  Bed Mobility:  Rolling: Maximum assistance  Supine to Sit: Maximum assistance  Sit to Supine: Maximum assistance  Scooting: Total assistance  Transfers:                             Balance:   Sitting: Impaired  Sitting - Static: Poor (constant support)  Sitting - Dynamic: Poor (constant support)  Ambulation/Gait Training:                                                        Functional Measure:    [address] [place] [id number] AM-PAC 6 Clicks         Basic Mobility Inpatient Short Form  How much difficulty does the patient currently have. .. Unable A Lot A Little None   1. Turning over in bed (including adjusting bedclothes, sheets and blankets)? [] 1   [x] 2   [] 3   [] 4   2. Sitting down on and standing up from a chair with arms ( e.g., wheelchair, bedside commode, etc.)   [] 1   [x] 2   [] 3   [] 4   3. Moving from lying on back to sitting on the side of the bed? [] 1   [x] 2   [] 3   [] 4          How much help from another person does the patient currently need. .. Total A Lot A Little None   4. Moving to and from a bed to a chair (including a wheelchair)? [x] 1   [] 2   [] 3   [] 4   5. Need to walk in hospital room? [x] 1   [] 2   [] 3   [] 4   6. Climbing 3-5 steps with a railing? [x] 1   [] 2   [] 3   [] 4   © 2007, Trustees of [address] [place] [address] [address] [id number], under license to [employer].  All rights reserved     Score:  Initial: 9 Most Recent: X (Date:10/19/21)   Interpretation of Tool:  Represents activities that are increasingly more difficult (i.e. Bed mobility, Transfers, Gait). Score 24 23 22-20 19-15 14-10 9-7 6   Modifier CH CI CJ CK CL CM CN          Physical Therapy Evaluation Charge Determination   History Examination Presentation Decision-Making   MEDIUM  Complexity : 1-2 comorbidities / personal factors will impact the outcome/ POC  MEDIUM Complexity : 3 Standardized tests and measures addressing body structure, function, activity limitation and / or participation in recreation  LOW Complexity : Stable, uncomplicated  Other Functional Measure [facility] 6 low      Based on the above components, the patient evaluation is determined to be of the following complexity level: LOW     Pain Ratin/10 in back    Activity Tolerance:   Fair and requires rest breaks  Please refer to the flowsheet for vital signs taken during this treatment. After treatment patient left in no apparent distress:   Supine in bed and Call bell within reach    COMMUNICATION/EDUCATION:   The patients plan of care was discussed with: Registered nurse. Patient/family agree to work toward stated goals and plan of care.     Thank you for this referral.  Loreta Norton   Time Calculation: 21 mins

## 2021-10-19 NOTE — PROGRESS NOTES
Bedside and Verbal shift change report given to Macario Layton RN (oncoming nurse) by Ghazala Lockhart RN (offgoing nurse). Report included the following information SBAR and MAR.

## 2021-10-19 NOTE — PROGRESS NOTES
Day #6 of Vancomycin    Indication for Antimicrobials: SSTi     Consult placed by: Dr. Chelita Kiran    Significant Cultures:   10/14 blood: coag neg staph 4/4, GPC clusters 3/4 - prelim  10/14 urine: C. Coli, klebsiella pneumoniae    Labs:  Recent Labs     10/19/21  0851 10/18/21  0813   CREA 0.29*  --    BUN 4*  --    WBC 12.8* 12.3*         Is the Patient on Dialysis? No    Goal level: AUC/RANULFO 400-600     Impression/Plan:   . The level today is at 31.2 mg/l this level was done after dose was given. Will continue current regimen of 1000 mg IV q8h  Will get trough on 10/20/21 at 0800 am     Pharmacy will follow daily and adjust medications as appropriate for renal function and/or serum levels.     Thank you,  Casimiro Lr, PHARMD

## 2021-10-19 NOTE — PROGRESS NOTES
General Daily Progress Note          Patient Name:   Varghese Allan       YOB: 1950       Age:  73 y.o. Admit Date: 10/14/2021      Subjective:      Patient is a 73y.o. year old female history of CAD status post CABG recent spinal surgery at T11 present with altered mental status and pain patient with history of baseline confusion but patient was more confused for last 3 days at the rehab facility patient have a T11 vertebral surgery at 571 complicated by an infection status post washout on 9 7 came to emergency room seen by the ER physician patient WBC count was elevated and patient was recommend to be admitted with altered mental status possible infection      Patient alert awake not eating drinking well discussed with the patient  according to him she is very picky about the food    Today sodium level is 118           Objective:     Visit Vitals  BP (!) 201/103 (BP 1 Location: Left upper arm, BP Patient Position: At rest)   Pulse 82   Temp 97.4 °F (36.3 °C)   Resp 22   Ht 5' 8\" (1.727 m)   Wt 112.2 kg (247 lb 5.7 oz)   SpO2 97%   Breastfeeding No   BMI 37.61 kg/m²        Recent Results (from the past 24 hour(s))   CBC WITH AUTOMATED DIFF    Collection Time: 10/19/21  8:51 AM   Result Value Ref Range    WBC 12.8 (H) 3.6 - 11.0 K/uL    RBC 3.42 (L) 3.80 - 5.20 M/uL    HGB 9.4 (L) 11.5 - 16.0 g/dL    HCT 28.4 (L) 35.0 - 47.0 %    MCV 83.0 80.0 - 99.0 FL    MCH 27.5 26.0 - 34.0 PG    MCHC 33.1 30.0 - 36.5 g/dL    RDW 15.2 (H) 11.5 - 14.5 %    PLATELET 070 (H) 841 - 400 K/uL    MPV 8.9 8.9 - 12.9 FL    NRBC 0.0 0.0  WBC    ABSOLUTE NRBC 0.00 0.00 - 0.01 K/uL    NEUTROPHILS 66 32 - 75 %    LYMPHOCYTES 18 12 - 49 %    MONOCYTES 7 5 - 13 %    EOSINOPHILS 4 0 - 7 %    BASOPHILS 1 0 - 1 %    IMMATURE GRANULOCYTES 4 (H) 0 - 0.5 %    ABS. NEUTROPHILS 8.6 (H) 1.8 - 8.0 K/UL    ABS. LYMPHOCYTES 2.3 0.8 - 3.5 K/UL    ABS. MONOCYTES 0.9 0.0 - 1.0 K/UL    ABS.  EOSINOPHILS 0.5 (H) 0.0 - 0.4 K/UL    ABS. BASOPHILS 0.1 0.0 - 0.1 K/UL    ABS. IMM. GRANS. 0.5 (H) 0.00 - 0.04 K/UL    DF AUTOMATED     METABOLIC PANEL, COMPREHENSIVE    Collection Time: 10/19/21  8:51 AM   Result Value Ref Range    Sodium 118 (LL) 136 - 145 mmol/L    Potassium 4.1 3.5 - 5.1 mmol/L    Chloride 86 (L) 97 - 108 mmol/L    CO2 21 21 - 32 mmol/L    Anion gap 11 5 - 15 mmol/L    Glucose 76 65 - 100 mg/dL    BUN 4 (L) 6 - 20 mg/dL    Creatinine 0.29 (L) 0.55 - 1.02 mg/dL    BUN/Creatinine ratio 14 12 - 20      GFR est AA >60 >60 ml/min/1.73m2    GFR est non-AA >60 >60 ml/min/1.73m2    Calcium 8.2 (L) 8.5 - 10.1 mg/dL    Bilirubin, total 0.4 0.2 - 1.0 mg/dL    AST (SGOT) 14 (L) 15 - 37 U/L    ALT (SGPT) 12 12 - 78 U/L    Alk. phosphatase 113 45 - 117 U/L    Protein, total 6.5 6.4 - 8.2 g/dL    Albumin 2.0 (L) 3.5 - 5.0 g/dL    Globulin 4.5 (H) 2.0 - 4.0 g/dL    A-G Ratio 0.4 (L) 1.1 - 2.2     C REACTIVE PROTEIN, QT    Collection Time: 10/19/21  8:51 AM   Result Value Ref Range    C-Reactive protein 7.17 (H) 0.00 - 0.60 mg/dL   SED RATE (ESR)    Collection Time: 10/19/21  8:51 AM   Result Value Ref Range    Sed rate, automated 85 mm/hr   VANCOMYCIN, TROUGH    Collection Time: 10/19/21 10:45 AM   Result Value Ref Range    Vancomycin,trough 31.2 (HH) 5.0 - 10.0 ug/mL    Reported dose date Dose Dependent      Reported dose: Dose Dependent Units     [unfilled]      Review of Systems    Constitutional: Negative for chills and fever. HENT: Negative. Eyes: Negative. Respiratory: Negative. Cardiovascular: Negative. Gastrointestinal: Negative for abdominal pain and nausea. Skin: Negative. Neurological: Negative. Physical Exam:      Constitutional: pt is oriented to person, place, and time. HENT:   Head: Normocephalic and atraumatic. Eyes: Pupils are equal, round, and reactive to light. EOM are normal.   Cardiovascular: Normal rate, regular rhythm and normal heart sounds.    Pulmonary/Chest: Breath sounds normal. No wheezes. No rales. Exhibits no tenderness. Abdominal: Soft. Bowel sounds are normal. There is no abdominal tenderness. There is no rebound and no guarding. Musculoskeletal: Normal range of motion. Neurological: pt is alert and oriented to person, place, and time. DUPLEX LOWER EXT VENOUS BILAT   Final Result      CT ABD PELV W CONT   Final Result   No acute findings. Constipation      XR CHEST PORT   Final Result   1. Possible lingular airspace opacity could represent pneumonia or atelectasis. 2. Mild interstitial pulmonary edema. CT HEAD WO CONT   Final Result   No acute intracranial abnormality. Recent Results (from the past 24 hour(s))   CBC WITH AUTOMATED DIFF    Collection Time: 10/19/21  8:51 AM   Result Value Ref Range    WBC 12.8 (H) 3.6 - 11.0 K/uL    RBC 3.42 (L) 3.80 - 5.20 M/uL    HGB 9.4 (L) 11.5 - 16.0 g/dL    HCT 28.4 (L) 35.0 - 47.0 %    MCV 83.0 80.0 - 99.0 FL    MCH 27.5 26.0 - 34.0 PG    MCHC 33.1 30.0 - 36.5 g/dL    RDW 15.2 (H) 11.5 - 14.5 %    PLATELET 743 (H) 024 - 400 K/uL    MPV 8.9 8.9 - 12.9 FL    NRBC 0.0 0.0  WBC    ABSOLUTE NRBC 0.00 0.00 - 0.01 K/uL    NEUTROPHILS 66 32 - 75 %    LYMPHOCYTES 18 12 - 49 %    MONOCYTES 7 5 - 13 %    EOSINOPHILS 4 0 - 7 %    BASOPHILS 1 0 - 1 %    IMMATURE GRANULOCYTES 4 (H) 0 - 0.5 %    ABS. NEUTROPHILS 8.6 (H) 1.8 - 8.0 K/UL    ABS. LYMPHOCYTES 2.3 0.8 - 3.5 K/UL    ABS. MONOCYTES 0.9 0.0 - 1.0 K/UL    ABS. EOSINOPHILS 0.5 (H) 0.0 - 0.4 K/UL    ABS. BASOPHILS 0.1 0.0 - 0.1 K/UL    ABS. IMM.  GRANS. 0.5 (H) 0.00 - 0.04 K/UL    DF AUTOMATED     METABOLIC PANEL, COMPREHENSIVE    Collection Time: 10/19/21  8:51 AM   Result Value Ref Range    Sodium 118 (LL) 136 - 145 mmol/L    Potassium 4.1 3.5 - 5.1 mmol/L    Chloride 86 (L) 97 - 108 mmol/L    CO2 21 21 - 32 mmol/L    Anion gap 11 5 - 15 mmol/L    Glucose 76 65 - 100 mg/dL    BUN 4 (L) 6 - 20 mg/dL    Creatinine 0.29 (L) 0.55 - 1.02 mg/dL    BUN/Creatinine ratio 14 12 - 20      GFR est AA >60 >60 ml/min/1.73m2    GFR est non-AA >60 >60 ml/min/1.73m2    Calcium 8.2 (L) 8.5 - 10.1 mg/dL    Bilirubin, total 0.4 0.2 - 1.0 mg/dL    AST (SGOT) 14 (L) 15 - 37 U/L    ALT (SGPT) 12 12 - 78 U/L    Alk. phosphatase 113 45 - 117 U/L    Protein, total 6.5 6.4 - 8.2 g/dL    Albumin 2.0 (L) 3.5 - 5.0 g/dL    Globulin 4.5 (H) 2.0 - 4.0 g/dL    A-G Ratio 0.4 (L) 1.1 - 2.2     C REACTIVE PROTEIN, QT    Collection Time: 10/19/21  8:51 AM   Result Value Ref Range    C-Reactive protein 7.17 (H) 0.00 - 0.60 mg/dL   SED RATE (ESR)    Collection Time: 10/19/21  8:51 AM   Result Value Ref Range    Sed rate, automated 85 mm/hr   VANCOMYCIN, TROUGH    Collection Time: 10/19/21 10:45 AM   Result Value Ref Range    Vancomycin,trough 31.2 (HH) 5.0 - 10.0 ug/mL    Reported dose date Dose Dependent      Reported dose: Dose Dependent Units       Results     Procedure Component Value Units Date/Time    CULTURE, BLOOD #1 [[id number]]     Order Status: Canceled Specimen: Blood     CULTURE, BLOOD #2 [[id number]]     Order Status: Canceled Specimen: Blood     COVID-19 RAPID TEST [[id number]] Collected: 10/14/21 1842    Order Status: Completed Specimen: Nasopharyngeal Updated: 10/14/21 2016     Specimen source Nasopharyngeal        COVID-19 rapid test Not Detected        Comment: Rapid Abbott ID Now   Rapid NAAT:  The specimen is NEGATIVE for SARS-CoV-2, the novel coronavirus associated with COVID-19. Negative results should be treated as presumptive and, if inconsistent with clinical signs and symptoms or necessary for patient management, should be tested with an alternative molecular assay. Negative results do not preclude SARS-CoV-2 infection and should not be used as the sole basis for patient management decisions. This test has been authorized by the FDA under   an Emergency Use Authorization (EUA) for use by authorized laboratories.  Fact sheet for Healthcare Providers: ConventionUpdate.co.nz Fact sheet for Patients: ConventionUpdate.co.nz   Methodology: Isothermal Nucleic Acid Amplification         CULTURE, BLOOD, PAIRED [[id number]]  (Abnormal) Collected: 10/14/21 1700    Order Status: Completed Specimen: Blood Updated: 10/19/21 5883     Special Requests: Blood        Culture result:       Staphylococcus species, coagulase negative growing in all 4 bottles drawn NO SITE INDICATED                  preliminary report of Gram Positive Cocci in clusters growing in 3 of 4 bottles drawn CALLED TO AND READ BACK BY [facility] ON 10/16/21 AT 1512. SH          CULTURE, URINE [[id number]]  (Abnormal)  (Susceptibility) Collected: 10/14/21 1700    Order Status: Completed Specimen: Urine Updated: 10/17/21 1121     Special Requests: --        No Special Requests  Reflexed from [id number]       [place] Count --        >100,000  colonies/ml       Culture result: Escherichia coli         Klebsiella pneumoniae       Susceptibility      Escherichia coli Klebsiella pneumoniae      RANULFO RANULFO      Amikacin ($) Susceptible Susceptible      Ampicillin ($) Resistant Resistant      Ampicillin/sulbactam ($) Intermediate Susceptible      Cefazolin ($) Susceptible Susceptible      Cefepime ($$) Susceptible Susceptible      Cefoxitin Susceptible Susceptible      Ceftazidime ($) Susceptible Susceptible      Ceftriaxone ($) Susceptible Susceptible      Ciprofloxacin ($) Susceptible Susceptible      Gentamicin ($) Susceptible Susceptible      Levofloxacin ($) Susceptible Susceptible      Meropenem ($$) Susceptible Susceptible      Nitrofurantoin Susceptible Susceptible      Piperacillin/Tazobac ($) Susceptible Susceptible      Tobramycin ($) Susceptible Susceptible      Trimeth/Sulfa Susceptible Susceptible                 Linear View                          Labs:     Recent Labs     10/19/21  0851 10/18/21  0813   WBC 12.8* 12.3*   HGB 9.4* 9.3*   HCT 28.4* 28.6*   * 758* Recent Labs     10/19/21  0851   *   K 4.1   CL 86*   CO2 21   BUN 4*   CREA 0.29*   GLU 76   CA 8.2*     Recent Labs     10/19/21  0851   ALT 12      TBILI 0.4   TP 6.5   ALB 2.0*   GLOB 4.5*     No results for input(s): INR, PTP, APTT, INREXT, INREXT in the last 72 hours. No results for input(s): FE, TIBC, PSAT, FERR in the last 72 hours. No results found for: FOL, RBCF   No results for input(s): PH, PCO2, PO2 in the last 72 hours. No results for input(s): CPK, CKNDX, TROIQ in the last 72 hours.     No lab exists for component: CPKMB  No results found for: CHOL, CHOLX, CHLST, CHOLV, HDL, HDLP, LDL, LDLC, DLDLP, TGLX, TRIGL, TRIGP, CHHD, CHHDX  No results found for: [facility]  Lab Results   Component Value Date/Time    Color Amy 10/14/2021 05:00 PM    Appearance Turbid (A) 10/14/2021 05:00 PM    Specific gravity 1.024 10/14/2021 05:00 PM    pH (UA) 5.0 10/14/2021 05:00 PM    Protein 100 (A) 10/14/2021 05:00 PM    Glucose Negative 10/14/2021 05:00 PM    Ketone 5 (A) 10/14/2021 05:00 PM    Bilirubin Negative 10/14/2021 05:00 PM    Urobilinogen 0.1 10/14/2021 05:00 PM    Nitrites Negative 10/14/2021 05:00 PM    Leukocyte Esterase Moderate (A) 10/14/2021 05:00 PM    Bacteria 4+ (A) 10/14/2021 05:00 PM    WBC  10/14/2021 05:00 PM    RBC  10/14/2021 05:00 PM         Assessment:          Altered mental status  Metabolic encephalopathy  Leukocytosis  UTI  History of CAD status post CABG  Recent spinal surgery at O99 complicated by infection status post washout  Hypertension  Dementia  Depression  Hypothyroidism  Hyponatremia    GERD  Sepsis with UTI E. coli Klebsiella pneumonia  Thoracolumbar r spinal surgery with MRSA postoperative infection on vancomycin and rifampin      Plan:   Start on sodium chloride tablet  Nephrology consult    Continue current medication on IV vancomycin and IV meropenem    We will discuss with infectious disease possible discharge home on IV antibiotic      Possible discharge in 24  48 hrs.     Current Facility-Administered Medications:     traMADoL (ULTRAM) tablet 50 mg, 50 mg, Oral, Q6H PRN, Rick Chicas MD, 50 mg at 10/19/21 0944    hydrALAZINE (APRESOLINE) tablet 25 mg, 25 mg, Oral, TID, Rick Chicas MD, 25 mg at 10/19/21 0944    sodium chloride tablet 1 g, 1 g, Oral, TID WITH MEALS, Rick Chicas MD, 1 g at 10/19/21 1249    vancomycin (VANCOCIN) 1,000 mg in 0.9% sodium chloride 250 mL (VIAL-MATE), 1,000 mg, IntraVENous, Q8H, Richard Amado MD    [START ON 10/20/2021] Trough Vancomycin level to be drawn on 10/20/21 at 0800 am., , Other, John Prieto MD    lisinopriL (PRINIVIL, ZESTRIL) tablet 20 mg, 20 mg, Oral, BID, Linnea TRUJILLO MD, 20 mg at 10/19/21 0944    carvediloL (COREG) tablet 25 mg, 25 mg, Oral, BID WITH MEALS, Linnea TRUJILLO MD, 25 mg at 10/19/21 0942    meropenem (MERREM) 1 g in sterile water (preservative free) 20 mL IV syringe, 1 g, IntraVENous, Q8H, Richard Amado MD, 1 g at 10/19/21 0944    albuterol (PROVENTIL HFA, VENTOLIN HFA, PROAIR HFA) inhaler 2 Puff, 2 Puff, Inhalation, Q6H PRN, Rick Chicas MD    [facility]) capsule 30 mg, 30 mg, Oral, TID, Rick Chicas MD, 30 mg at 10/19/21 0900    donepeziL (ARICEPT) tablet 10 mg, 10 mg, Oral, QHS, Rick Chicas MD, 10 mg at 10/18/21 2123    DULoxetine (CYMBALTA) capsule 60 mg, 60 mg, Oral, DAILY, Rick Chicas MD, 60 mg at 10/19/21 0942    escitalopram oxalate (LEXAPRO) tablet 20 mg, 20 mg, Oral, DAILY, Rick Chicas MD, 20 mg at 10/19/21 0947    levothyroxine (SYNTHROID) tablet 150 mcg, 150 mcg, Oral, ACB, Rick Chicas MD, 150 mcg at 10/19/21 0943    memantine (NAMENDA) tablet 10 mg, 10 mg, Oral, BID, Rick Chicas MD, 10 mg at 10/19/21 0945    pantoprazole (PROTONIX) tablet 40 mg, 40 mg, Oral, DAILY, Rick Chicas MD, 40 mg at 10/19/21 0944    predniSONE (DELTASONE) tablet 5 mg, 5 mg, Oral, DAILY WITH BREAKFAST, Hugo Phoenix, MD, 5 mg at 10/19/21 0944    rifAMPin (RIFADIN) capsule 300 mg, 300 mg, Oral, ACB&D, Foster Chicas MD, 300 mg at 10/19/21 0943    0.9% sodium chloride infusion, 75 mL/hr, IntraVENous, CONTINUOUS, Rick Chicas MD, Last Rate: 75 mL/hr at 10/18/21 1800, 75 mL/hr at 10/18/21 1800    acetaminophen (TYLENOL) tablet 650 mg, 650 mg, Oral, Q6H PRN, 650 mg at 10/18/21 2123 **OR** acetaminophen (TYLENOL) suppository 650 mg, 650 mg, Rectal, Q6H PRN, Foster Chicas MD    polyethylene glycol (MIRALAX) packet 17 g, 17 g, Oral, DAILY PRN, Foster Chicas MD    ondansetron (ZOFRAN ODT) tablet 4 mg, 4 mg, Oral, Q8H PRN **OR** ondansetron (ZOFRAN) injection 4 mg, 4 mg, IntraVENous, Q6H PRN, Rick Chicas MD    heparin (porcine) injection 5,000 Units, 5,000 Units, SubCUTAneous, Q8H, Rick Chicas MD, 5,000 Units at 10/19/21 0602    Vancomycin Pharmacy Dosing, , Other, Rx Dosing/Monitoring, Foster Chicas MD    hydrOXYzine (VISTARIL) injection 25 mg, 25 mg, IntraMUSCular, Q6H PRN, Rick Chicas MD, 25 mg at 10/18/21 2125

## 2021-10-19 NOTE — CONSULTS
Comprehensive Nutrition Assessment    Type and Reason for Visit: Consult, Initial (Poor PO)    Nutrition Recommendations/Plan:   Adjust to Regular diet for better PO acceptance      D/C 3 CHO and 2 gm NA restrictions    Add ensure HP TID Jazmyn Peeks)  Add ensure pudding TID Jazmyn Peeks)      Noted lactose intolerance, pudding and ensure are suitable for lactose intolerance    Document % intakes and BM in I/O's    Consider possible NGT if agreeable with POC    Nutrition Assessment:    Admitted for AMS. Sepsis with UTI E. coli Klebsiella pneumonia. Low sodium, nephro consulted. Barrier to d/c is pt poor PO, RD consulted. RD visited pt bedside, but 2/2 lethargy was not able to obtain info. Called pt husbands who reports pt is not a picky eater, she just does not like hospital food/\"dry chicken\". State she needs a better variety of food. Food preferences include: parfait/ yogurt and casseroles. At home pt has good acceptance of ensure.  believes pt would accept ensure and pudding, but VANILLA ONLY. RD to order. RD relayed food preferences and ONS to foodservice members to help with pt PO intakes. Labs: H/H 9.4/28.4, Na 118, BUN 4, Cr 0.29, ketones 5. Malnutrition Assessment:  Malnutrition Status:  Mild malnutrition    Context:  Acute illness     Findings of the 6 clinical characteristics of malnutrition:   Energy Intake:  7 - 50% or less of est energy requirements for 5 or more days  Weight Loss:  No significant weight loss     Body Fat Loss:  No significant body fat loss,     Muscle Mass Loss:  No significant muscle mass loss,    Fluid Accumulation:  No significant fluid accumulation,      Nutrition Related Findings:  NFPE without acute findings. No h/o dysphagia. No known N/V/D/C. Last BM 10/18. No edema. Wounds:    Surgical incision (s/p surgical incision to back)       Current Nutrition Therapies:  ADULT DIET Regular; 3 carb choices (45 gm/meal);  Low Fat/Low Chol/High Fiber/2 gm Na  ADULT ORAL NUTRITION SUPPLEMENT Dinner, Breakfast, Lunch; Low Calorie/High Protein  ADULT ORAL NUTRITION SUPPLEMENT Lunch, Breakfast; Fortified Pudding    Anthropometric Measures:  · Height:  5' 8\" (172.7 cm)  · Current Body Wt:  110 kg (242 lb 8.1 oz)   · Usual Body Wt:   ( denies wt changes)     · Ideal Body Wt:  140 lbs:  173.2 %   · BMI Category:  Obese class 2 (BMI 35.0-39. 9)       Nutrition Diagnosis:   · Inadequate oral intake related to  (dislike of food) as evidenced by intake 0-25%    Nutrition Interventions:   Food and/or Nutrient Delivery: Modify current diet, Start oral nutrition supplement (d/c diet restrictions)  Nutrition Education and Counseling: No recommendations at this time  Coordination of Nutrition Care: Continue to monitor while inpatient    Goals:  Pt to meet >50% of EEN within 2 days. Na WNL. BM every 1-2 days.        Nutrition Monitoring and Evaluation:   Behavioral-Environmental Outcomes: None identified  Food/Nutrient Intake Outcomes: Food and nutrient intake, Supplement intake  Physical Signs/Symptoms Outcomes: Biochemical data, Meal time behavior    Discharge Planning:    Continue oral nutrition supplement (if with poor PO)     Electronically signed by Shaniqua Arrington RD on 10/19/2021 at 1:28 PM    Contact: [phone]

## 2021-10-20 LAB
ALBUMIN SERPL-MCNC: 1.9 G/DL (ref 3.5–5)
ALBUMIN/GLOB SERPL: 0.4 {RATIO} (ref 1.1–2.2)
ALP SERPL-CCNC: 101 U/L (ref 45–117)
ALT SERPL-CCNC: 9 U/L (ref 12–78)
ANION GAP SERPL CALC-SCNC: 10 MMOL/L (ref 5–15)
AST SERPL W P-5'-P-CCNC: 15 U/L (ref 15–37)
BASOPHILS # BLD: 0.1 K/UL (ref 0–0.1)
BASOPHILS NFR BLD: 1 % (ref 0–1)
BILIRUB SERPL-MCNC: 0.3 MG/DL (ref 0.2–1)
BUN SERPL-MCNC: 3 MG/DL (ref 6–20)
BUN/CREAT SERPL: 12 (ref 12–20)
CA-I BLD-MCNC: 8.2 MG/DL (ref 8.5–10.1)
CHLORIDE SERPL-SCNC: 86 MMOL/L (ref 97–108)
CO2 SERPL-SCNC: 24 MMOL/L (ref 21–32)
CREAT SERPL-MCNC: 0.26 MG/DL (ref 0.55–1.02)
CRP SERPL-MCNC: 9.72 MG/DL (ref 0–0.6)
DATE LAST DOSE: ABNORMAL
DIFFERENTIAL METHOD BLD: ABNORMAL
EOSINOPHIL # BLD: 0.9 K/UL (ref 0–0.4)
EOSINOPHIL NFR BLD: 8 % (ref 0–7)
ERYTHROCYTE [DISTWIDTH] IN BLOOD BY AUTOMATED COUNT: 15 % (ref 11.5–14.5)
ERYTHROCYTE [SEDIMENTATION RATE] IN BLOOD: 100 MM/HR
GLOBULIN SER CALC-MCNC: 4.6 G/DL (ref 2–4)
GLUCOSE SERPL-MCNC: 72 MG/DL (ref 65–100)
HCT VFR BLD AUTO: 27.1 % (ref 35–47)
HGB BLD-MCNC: 8.8 G/DL (ref 11.5–16)
IMM GRANULOCYTES # BLD AUTO: 0 K/UL
IMM GRANULOCYTES NFR BLD AUTO: 0 %
LYMPHOCYTES # BLD: 1.6 K/UL (ref 0.8–3.5)
LYMPHOCYTES NFR BLD: 15 % (ref 12–49)
MCH RBC QN AUTO: 27.2 PG (ref 26–34)
MCHC RBC AUTO-ENTMCNC: 32.5 G/DL (ref 30–36.5)
MCV RBC AUTO: 83.9 FL (ref 80–99)
MONOCYTES # BLD: 0.3 K/UL (ref 0–1)
MONOCYTES NFR BLD: 3 % (ref 5–13)
MYELOCYTES NFR BLD MANUAL: 1 %
NEUTS SEG # BLD: 7.8 K/UL (ref 1.8–8)
NEUTS SEG NFR BLD: 72 % (ref 32–75)
NRBC # BLD: 0 K/UL (ref 0–0.01)
NRBC BLD-RTO: 0 PER 100 WBC
PLATELET # BLD AUTO: 719 K/UL (ref 150–400)
PMV BLD AUTO: 9.5 FL (ref 8.9–12.9)
POTASSIUM SERPL-SCNC: 3.9 MMOL/L (ref 3.5–5.1)
PROT SERPL-MCNC: 6.5 G/DL (ref 6.4–8.2)
RBC # BLD AUTO: 3.23 M/UL (ref 3.8–5.2)
RBC MORPH BLD: ABNORMAL
REPORTED DOSE,DOSE: ABNORMAL UNITS
SODIUM SERPL-SCNC: 120 MMOL/L (ref 136–145)
TSH SERPL DL<=0.05 MIU/L-ACNC: 42.8 UIU/ML (ref 0.36–3.74)
URATE SERPL-MCNC: 2.2 MG/DL (ref 2.6–6)
VANCOMYCIN TROUGH SERPL-MCNC: 23.8 UG/ML (ref 5–10)
WBC # BLD AUTO: 10.9 K/UL (ref 3.6–11)

## 2021-10-20 PROCEDURE — 80202 ASSAY OF VANCOMYCIN: CPT

## 2021-10-20 PROCEDURE — 84443 ASSAY THYROID STIM HORMONE: CPT

## 2021-10-20 PROCEDURE — 36415 COLL VENOUS BLD VENIPUNCTURE: CPT

## 2021-10-20 PROCEDURE — 74011250636 HC RX REV CODE- 250/636: Performed by: INTERNAL MEDICINE

## 2021-10-20 PROCEDURE — 80053 COMPREHEN METABOLIC PANEL: CPT

## 2021-10-20 PROCEDURE — 74011250637 HC RX REV CODE- 250/637: Performed by: INTERNAL MEDICINE

## 2021-10-20 PROCEDURE — 99232 SBSQ HOSP IP/OBS MODERATE 35: CPT | Performed by: INTERNAL MEDICINE

## 2021-10-20 PROCEDURE — 74011250637 HC RX REV CODE- 250/637: Performed by: FAMILY MEDICINE

## 2021-10-20 PROCEDURE — 85652 RBC SED RATE AUTOMATED: CPT

## 2021-10-20 PROCEDURE — 85025 COMPLETE CBC W/AUTO DIFF WBC: CPT

## 2021-10-20 PROCEDURE — 74011636637 HC RX REV CODE- 636/637: Performed by: FAMILY MEDICINE

## 2021-10-20 PROCEDURE — 86140 C-REACTIVE PROTEIN: CPT

## 2021-10-20 PROCEDURE — 74011000250 HC RX REV CODE- 250: Performed by: INTERNAL MEDICINE

## 2021-10-20 PROCEDURE — 83930 ASSAY OF BLOOD OSMOLALITY: CPT

## 2021-10-20 PROCEDURE — 84550 ASSAY OF BLOOD/URIC ACID: CPT

## 2021-10-20 PROCEDURE — 65270000032 HC RM SEMIPRIVATE

## 2021-10-20 PROCEDURE — 74011250636 HC RX REV CODE- 250/636: Performed by: FAMILY MEDICINE

## 2021-10-20 RX ORDER — FUROSEMIDE 10 MG/ML
20 INJECTION INTRAMUSCULAR; INTRAVENOUS ONCE
Status: COMPLETED | OUTPATIENT
Start: 2021-10-20 | End: 2021-10-20

## 2021-10-20 RX ORDER — HYDROCODONE BITARTRATE AND ACETAMINOPHEN 5; 325 MG/1; MG/1
1 TABLET ORAL
Status: DISCONTINUED | OUTPATIENT
Start: 2021-10-20 | End: 2021-10-21 | Stop reason: HOSPADM

## 2021-10-20 RX ORDER — HYDROMORPHONE HYDROCHLORIDE 2 MG/1
2 TABLET ORAL
Status: DISCONTINUED | OUTPATIENT
Start: 2021-10-20 | End: 2021-10-21 | Stop reason: HOSPADM

## 2021-10-20 RX ORDER — LISINOPRIL 40 MG/1
40 TABLET ORAL DAILY
Status: DISCONTINUED | OUTPATIENT
Start: 2021-10-21 | End: 2021-10-20

## 2021-10-20 RX ORDER — HYDRALAZINE HYDROCHLORIDE 50 MG/1
100 TABLET, FILM COATED ORAL 3 TIMES DAILY
Status: DISCONTINUED | OUTPATIENT
Start: 2021-10-20 | End: 2021-10-21 | Stop reason: HOSPADM

## 2021-10-20 RX ORDER — LISINOPRIL 40 MG/1
40 TABLET ORAL DAILY
Status: DISCONTINUED | OUTPATIENT
Start: 2021-10-20 | End: 2021-10-21 | Stop reason: HOSPADM

## 2021-10-20 RX ADMIN — SODIUM CHLORIDE 1000 MG: 9 INJECTION, SOLUTION INTRAVENOUS at 03:22

## 2021-10-20 RX ADMIN — RIFAMPIN 300 MG: 300 CAPSULE ORAL at 16:30

## 2021-10-20 RX ADMIN — MEMANTINE HYDROCHLORIDE 10 MG: 10 TABLET ORAL at 09:27

## 2021-10-20 RX ADMIN — HYDRALAZINE HYDROCHLORIDE 100 MG: 50 TABLET, FILM COATED ORAL at 16:00

## 2021-10-20 RX ADMIN — SODIUM CHLORIDE 1000 MG: 9 INJECTION, SOLUTION INTRAVENOUS at 09:24

## 2021-10-20 RX ADMIN — HEPARIN SODIUM 5000 UNITS: 5000 INJECTION INTRAVENOUS; SUBCUTANEOUS at 05:10

## 2021-10-20 RX ADMIN — HYDROXYZINE HYDROCHLORIDE 25 MG: 50 INJECTION, SOLUTION INTRAMUSCULAR at 06:12

## 2021-10-20 RX ADMIN — LEVOTHYROXINE SODIUM 150 MCG: 0.07 TABLET ORAL at 09:24

## 2021-10-20 RX ADMIN — DONEPEZIL HYDROCHLORIDE 10 MG: 5 TABLET, FILM COATED ORAL at 22:00

## 2021-10-20 RX ADMIN — SODIUM CHLORIDE 1000 MG: 9 INJECTION, SOLUTION INTRAVENOUS at 18:02

## 2021-10-20 RX ADMIN — RIFAMPIN 300 MG: 300 CAPSULE ORAL at 09:27

## 2021-10-20 RX ADMIN — TRAMADOL HYDROCHLORIDE 50 MG: 50 TABLET, FILM COATED ORAL at 04:33

## 2021-10-20 RX ADMIN — LISINOPRIL 40 MG: 40 TABLET ORAL at 09:39

## 2021-10-20 RX ADMIN — MEMANTINE HYDROCHLORIDE 10 MG: 10 TABLET ORAL at 21:59

## 2021-10-20 RX ADMIN — HYDRALAZINE HYDROCHLORIDE 100 MG: 50 TABLET, FILM COATED ORAL at 22:00

## 2021-10-20 RX ADMIN — CARVEDILOL 25 MG: 12.5 TABLET, FILM COATED ORAL at 09:25

## 2021-10-20 RX ADMIN — HEPARIN SODIUM 5000 UNITS: 5000 INJECTION INTRAVENOUS; SUBCUTANEOUS at 21:59

## 2021-10-20 RX ADMIN — HYDROMORPHONE HYDROCHLORIDE 2 MG: 2 TABLET ORAL at 18:02

## 2021-10-20 RX ADMIN — HYDROCODONE BITARTRATE AND ACETAMINOPHEN 1 TABLET: 5; 325 TABLET ORAL at 09:54

## 2021-10-20 RX ADMIN — MEROPENEM 1 G: 1 INJECTION, POWDER, FOR SOLUTION INTRAVENOUS at 18:03

## 2021-10-20 RX ADMIN — DULOXETINE 60 MG: 30 CAPSULE, DELAYED RELEASE ORAL at 09:27

## 2021-10-20 RX ADMIN — HEPARIN SODIUM 5000 UNITS: 5000 INJECTION INTRAVENOUS; SUBCUTANEOUS at 14:32

## 2021-10-20 RX ADMIN — HYDRALAZINE HYDROCHLORIDE 100 MG: 50 TABLET, FILM COATED ORAL at 09:30

## 2021-10-20 RX ADMIN — MEROPENEM 1 G: 1 INJECTION, POWDER, FOR SOLUTION INTRAVENOUS at 00:44

## 2021-10-20 RX ADMIN — Medication 1 G: at 18:02

## 2021-10-20 RX ADMIN — FUROSEMIDE 20 MG: 10 INJECTION, SOLUTION INTRAMUSCULAR; INTRAVENOUS at 09:37

## 2021-10-20 RX ADMIN — ESCITALOPRAM OXALATE 20 MG: 10 TABLET ORAL at 09:28

## 2021-10-20 RX ADMIN — CEVIMELINE 30 MG: 30 CAPSULE ORAL at 09:28

## 2021-10-20 RX ADMIN — CEVIMELINE 30 MG: 30 CAPSULE ORAL at 22:00

## 2021-10-20 RX ADMIN — Medication 1 G: at 09:25

## 2021-10-20 RX ADMIN — CEVIMELINE 30 MG: 30 CAPSULE ORAL at 16:00

## 2021-10-20 RX ADMIN — CARVEDILOL 25 MG: 12.5 TABLET, FILM COATED ORAL at 18:02

## 2021-10-20 RX ADMIN — Medication 1 G: at 12:45

## 2021-10-20 RX ADMIN — PREDNISONE 5 MG: 5 TABLET ORAL at 09:25

## 2021-10-20 RX ADMIN — HYDROMORPHONE HYDROCHLORIDE 2 MG: 2 TABLET ORAL at 12:42

## 2021-10-20 RX ADMIN — MEROPENEM 1 G: 1 INJECTION, POWDER, FOR SOLUTION INTRAVENOUS at 09:24

## 2021-10-20 NOTE — PROGRESS NOTES
Chart reviewed. Patient pending nephrology consult for hyponatremia. DC plan: Return to [facility] and [address] when medically stable.

## 2021-10-20 NOTE — PROGRESS NOTES
Hospitalist Progress Note    Subjective:   Daily Progress Note: 10/20/2021 11:04 AM    Patient is [age] y.o. year old female history of CAD status post CABG recent spinal surgery at T11 present with altered mental status and pain patient with history of baseline confusion but patient was more confused for last 3 days at the rehab facility patient have a T11 vertebral surgery at 682 complicated by an infection status post washout on 9 7 came to emergency room seen by the ER physician patient WBC count was elevated and patient was recommend to be admitted with altered mental status possible infection    10/20  Patient seen today laying in bed. Slightly confused, slow to answer questions. Altered mental status. Hyponatremia 120. She admits to [facility] abdominal, R leg pain. She describes the pain as a burning sensation that travels down her leg. She is not sure when it began. She admitted to past hx of low back pain. Straight leg raise positive. She does not remember the last time she had had a bowel movement. She denies nausea, vomiting.      Current Facility-Administered Medications   Medication Dose Route Frequency    hydrALAZINE (APRESOLINE) tablet 100 mg  100 mg Oral TID    lisinopriL (PRINIVIL, ZESTRIL) tablet 40 mg  40 mg Oral DAILY    HYDROcodone-acetaminophen (NORCO) 5-325 mg per tablet 1 Tablet  1 Tablet Oral Q6H PRN    HYDROmorphone (DILAUDID) tablet 2 mg  2 mg Oral Q4H PRN    traMADoL (ULTRAM) tablet 50 mg  50 mg Oral Q6H PRN    sodium chloride tablet 1 g  1 g Oral TID WITH MEALS    vancomycin (VANCOCIN) 1,000 mg in 0.9% sodium chloride 250 mL (VIAL-MATE)  1,000 mg IntraVENous Q8H    carvediloL (COREG) tablet 25 mg  25 mg Oral BID WITH MEALS    meropenem (MERREM) 1 g in sterile water (preservative free) 20 mL IV syringe  1 g IntraVENous Q8H    albuterol (PROVENTIL HFA, VENTOLIN HFA, PROAIR HFA) inhaler 2 Puff  2 Puff Inhalation Q6H PRN    cevimeline (EVOXAC) capsule 30 mg  30 mg Oral TID    donepeziL (ARICEPT) tablet 10 mg  10 mg Oral QHS    DULoxetine (CYMBALTA) capsule 60 mg  60 mg Oral DAILY    escitalopram oxalate (LEXAPRO) tablet 20 mg  20 mg Oral DAILY    levothyroxine (SYNTHROID) tablet 150 mcg  150 mcg Oral ACB    memantine (NAMENDA) tablet 10 mg  10 mg Oral BID    predniSONE (DELTASONE) tablet 5 mg  5 mg Oral DAILY WITH BREAKFAST    rifAMPin (RIFADIN) capsule 300 mg  300 mg Oral ACB&D    acetaminophen (TYLENOL) tablet 650 mg  650 mg Oral Q6H PRN    Or    acetaminophen (TYLENOL) suppository 650 mg  650 mg Rectal Q6H PRN    polyethylene glycol (MIRALAX) packet 17 g  17 g Oral DAILY PRN    ondansetron (ZOFRAN ODT) tablet 4 mg  4 mg Oral Q8H PRN    Or    ondansetron (ZOFRAN) injection 4 mg  4 mg IntraVENous Q6H PRN    heparin (porcine) injection 5,000 Units  5,000 Units SubCUTAneous Q8H    Vancomycin Pharmacy Dosing   Other Rx Dosing/Monitoring    hydrOXYzine (VISTARIL) injection 25 mg  25 mg IntraMUSCular Q6H PRN            Objective:     Visit Vitals  BP (!) 176/73 (BP 1 Location: Left upper arm, BP Patient Position: At rest)   Pulse 75   Temp 97.5 °F (36.4 °C)   Resp 22   Ht 5' 8\" (1.727 m)   Wt 110 kg (242 lb 8.1 oz)   SpO2 96%   Breastfeeding No   BMI 36.87 kg/m²      O2 Device: None (Room air)    Temp (24hrs), Av.3 °F (36.3 °C), Min:97 °F (36.1 °C), Max:97.5 °F (36.4 °C)      10/20 0701 - 10/20 1900  In: -   Out: 7058 [CAZYB:5137]  10/18 1901 - 10/20 07  In: -   Out: 1700 [Urine:1700]    Physical Exam:  Constitutional: Alert and awake  HENT:  Head: Normocephalic and atraumatic. Eyes: Pupils are equal, round, and reactive to light. EOM are normal.   Cardiovascular: Normal rate, regular rhythm and normal heart sounds. Pulmonary/Chest: Breath sounds normal. No wheezes. No rales. Exhibits no tenderness. Abdominal: Abdomen distened. Bowel sounds are normal. There is abdominal tenderness in LLQ. There is no rebound and no guarding.     Musculoskeletal: R   Neurological: apporiate mood and judgement   Skin:       Data Review    24 hrs labs reviewed. -WBC down 10.9  -Na 120 low  -CRP 9.72 high  Recent Results (from the past 24 hour(s))   CBC WITH AUTOMATED DIFF    Collection Time: 10/20/21  9:02 AM   Result Value Ref Range    WBC 10.9 3.6 - 11.0 K/uL    RBC 3.23 (L) 3.80 - 5.20 M/uL    HGB 8.8 (L) 11.5 - 16.0 g/dL    HCT 27.1 (L) 35.0 - 47.0 %    MCV 83.9 80.0 - 99.0 FL    MCH 27.2 26.0 - 34.0 PG    MCHC 32.5 30.0 - 36.5 g/dL    RDW 15.0 (H) 11.5 - 14.5 %    PLATELET 327 (H) 731 - 400 K/uL    MPV 9.5 8.9 - 12.9 FL    NRBC 0.0 0.0  WBC    ABSOLUTE NRBC 0.00 0.00 - 0.01 K/uL    NEUTROPHILS PENDING %    LYMPHOCYTES PENDING %    MONOCYTES PENDING %    EOSINOPHILS PENDING %    BASOPHILS PENDING %    IMMATURE GRANULOCYTES PENDING %    ABS. NEUTROPHILS PENDING K/UL    ABS. LYMPHOCYTES PENDING K/UL    ABS. MONOCYTES PENDING K/UL    ABS. EOSINOPHILS PENDING K/UL    ABS. BASOPHILS PENDING K/UL    ABS. IMM. GRANS. PENDING K/UL    DF PENDING    C REACTIVE PROTEIN, QT    Collection Time: 10/20/21  9:02 AM   Result Value Ref Range    C-Reactive protein 9.72 (H) 0.00 - 0.60 mg/dL   SED RATE (ESR)    Collection Time: 10/20/21  9:02 AM   Result Value Ref Range    Sed rate, automated 850 mm/hr   METABOLIC PANEL, COMPREHENSIVE    Collection Time: 10/20/21  9:02 AM   Result Value Ref Range    Sodium 120 (L) 136 - 145 mmol/L    Potassium 3.9 3.5 - 5.1 mmol/L    Chloride 86 (L) 97 - 108 mmol/L    CO2 24 21 - 32 mmol/L    Anion gap 10 5 - 15 mmol/L    Glucose 72 65 - 100 mg/dL    BUN 3 (L) 6 - 20 mg/dL    Creatinine 0.26 (L) 0.55 - 1.02 mg/dL    BUN/Creatinine ratio 12 12 - 20      GFR est AA >60 >60 ml/min/1.73m2    GFR est non-AA >60 >60 ml/min/1.73m2    Calcium 8.2 (L) 8.5 - 10.1 mg/dL    Bilirubin, total 0.3 0.2 - 1.0 mg/dL    AST (SGOT) 15 15 - 37 U/L    ALT (SGPT) 9 (L) 12 - 78 U/L    Alk.  phosphatase 101 45 - 117 U/L    Protein, total 6.5 6.4 - 8.2 g/dL    Albumin 1.9 (L) 3.5 - 5.0 g/dL Globulin 4.6 (H) 2.0 - 4.0 g/dL    A-G Ratio 0.4 (L) 1.1 - 2.2     VANCOMYCIN, TROUGH    Collection Time: 10/20/21 11:06 AM   Result Value Ref Range    Vancomycin,trough 23.8 (HH) 5.0 - 10.0 ug/mL    Reported dose date Blood      Reported dose: Blood Units         Assessment/Plan:     Altered mental status  Metabolic encephalopathy  Leukocytosis-Resolved  UTI  History of CAD status post CABG  Recent spinal surgery at V23 complicated by infection status post washout  Hypertension  Dementia  Depression  Hypothyroidism  Hyponatremia-Improving  GERD  Sepsis with UTI E. coli Klebsiella pneumonia  Thoracolumbar r spinal surgery with MRSA postoperative infection on vancomycin and rifampin      PLAN    Start on sodium chloride tablet  Repeat labs      Monitor sodium level if stable then discharge home     Consult Ortho    Continue current medication on IV vancomycin and IV meropenem        Per ID:  2. Continue IV Meropenem 1 gm q8 for 4 more days for UTI (unable to switch to Levaquin because drug interaction with Aricept with QT prolongation)  4. Repeat urinalysis  5. Follow-up with her ID provider, Dr. Cristina Boland [phone], in [place] for her spine infection (Appt scheduled for Oct 26 at 2:20 pm)   6. Follow-up with Dr. Blaire Fountain at discharge (he is aware of hospitalization)  7.  Cleared for discharge to [facility] from ID standpoint

## 2021-10-20 NOTE — PROGRESS NOTES
*ATTENTION:  This note has been created by a medical student for educational purposes only. Please do not refer to the content of this note for clinical decision-making, billing, or other purposes. Please see attending physicians note to obtain clinical information on this patient. *      Hospitalist Progress Note    Subjective:   Daily Progress Note: 10/20/2021 11:04 AM    Patient is V 6 y.o. year old female history of CAD status post CABG recent spinal surgery at T11 present with altered mental status and pain patient with history of baseline confusion but patient was more confused for last 3 days at the rehab facility patient have a T11 vertebral surgery at 978 complicated by an infection status post washout on 9 7 came to emergency room seen by the ER physician patient WBC count was elevated and patient was recommend to be admitted with altered mental status possible infection    10/20  Patient seen today laying in bed. Slightly confused, slow to answer questions. Altered mental status. Hyponatremia 120. She admits to Q abdominal, R leg pain. She describes the pain as a burning sensation that travels down her leg. She is not sure when it began. She admitted to past hx of low back pain. Straight leg raise positive. She does not remember the last time she had had a bowel movement. She denies nausea, vomiting.      Current Facility-Administered Medications   Medication Dose Route Frequency    hydrALAZINE (APRESOLINE) tablet 100 mg  100 mg Oral TID    lisinopriL (PRINIVIL, ZESTRIL) tablet 40 mg  40 mg Oral DAILY    HYDROcodone-acetaminophen (NORCO) 5-325 mg per tablet 1 Tablet  1 Tablet Oral Q6H PRN    traMADoL (ULTRAM) tablet 50 mg  50 mg Oral Q6H PRN    sodium chloride tablet 1 g  1 g Oral TID WITH MEALS    vancomycin (VANCOCIN) 1,000 mg in 0.9% sodium chloride 250 mL (VIAL-MATE)  1,000 mg IntraVENous Q8H    carvediloL (COREG) tablet 25 mg  25 mg Oral BID WITH MEALS    meropenem (MERREM) 1 g in sterile water (preservative free) 20 mL IV syringe  1 g IntraVENous Q8H    albuterol (PROVENTIL HFA, VENTOLIN HFA, PROAIR HFA) inhaler 2 Puff  2 Puff Inhalation Q6H PRN    cevimeline (EVOXAC) capsule 30 mg  30 mg Oral TID    donepeziL (ARICEPT) tablet 10 mg  10 mg Oral QHS    DULoxetine (CYMBALTA) capsule 60 mg  60 mg Oral DAILY    escitalopram oxalate (LEXAPRO) tablet 20 mg  20 mg Oral DAILY    levothyroxine (SYNTHROID) tablet 150 mcg  150 mcg Oral ACB    memantine (NAMENDA) tablet 10 mg  10 mg Oral BID    predniSONE (DELTASONE) tablet 5 mg  5 mg Oral DAILY WITH BREAKFAST    rifAMPin (RIFADIN) capsule 300 mg  300 mg Oral ACB&D    0.9% sodium chloride infusion  75 mL/hr IntraVENous CONTINUOUS    acetaminophen (TYLENOL) tablet 650 mg  650 mg Oral Q6H PRN    Or    acetaminophen (TYLENOL) suppository 650 mg  650 mg Rectal Q6H PRN    polyethylene glycol (MIRALAX) packet 17 g  17 g Oral DAILY PRN    ondansetron (ZOFRAN ODT) tablet 4 mg  4 mg Oral Q8H PRN    Or    ondansetron (ZOFRAN) injection 4 mg  4 mg IntraVENous Q6H PRN    heparin (porcine) injection 5,000 Units  5,000 Units SubCUTAneous Q8H    Vancomycin Pharmacy Dosing   Other Rx Dosing/Monitoring    hydrOXYzine (VISTARIL) injection 25 mg  25 mg IntraMUSCular Q6H PRN            Objective:     Visit Vitals  BP (!) 176/73 (BP 1 Location: Left upper arm, BP Patient Position: At rest)   Pulse 75   Temp 97.5 °F (36.4 °C)   Resp 22   Ht 5' 8\" (1.727 m)   Wt 242 lb 8.1 oz (110 kg)   SpO2 96%   Breastfeeding No   BMI 36.87 kg/m²      O2 Device: None (Room air)    Temp (24hrs), Av.3 °F (36.3 °C), Min:97 °F (36.1 °C), Max:97.5 °F (36.4 °C)      10/20 07 - 10/20 190  In: -   Out: 1400 [Urine:1400]  10/18 1901 - 10/20 0700  In: -   Out: 1700 [Urine:1700]    Physical Exam:  Constitutional: Alert and awake  HENT:  Head: Normocephalic and atraumatic. Eyes: Pupils are equal, round, and reactive to light.  EOM are normal.   Cardiovascular: Normal rate, regular rhythm and normal heart sounds. Pulmonary/Chest: Breath sounds normal. No wheezes. No rales. Exhibits no tenderness. Abdominal: Abdomen distened. Bowel sounds are normal. There is abdominal tenderness in LLQ. There is no rebound and no guarding. Musculoskeletal: R   Neurological: apporiate mood and judgement   Skin:       Data Review    24 hrs labs reviewed. -WBC down 10.9  -Na 120 low  -CRP 9.72 high  Recent Results (from the past 24 hour(s))   CBC WITH AUTOMATED DIFF    Collection Time: 10/20/21  9:02 AM   Result Value Ref Range    WBC 10.9 3.6 - 11.0 K/uL    RBC 3.23 (L) 3.80 - 5.20 M/uL    HGB 8.8 (L) 11.5 - 16.0 g/dL    HCT 27.1 (L) 35.0 - 47.0 %    MCV 83.9 80.0 - 99.0 FL    MCH 27.2 26.0 - 34.0 PG    MCHC 32.5 30.0 - 36.5 g/dL    RDW 15.0 (H) 11.5 - 14.5 %    PLATELET 213 (H) 520 - 400 K/uL    MPV 9.5 8.9 - 12.9 FL    NRBC 0.0 0.0  WBC    ABSOLUTE NRBC 0.00 0.00 - 0.01 K/uL    NEUTROPHILS PENDING %    LYMPHOCYTES PENDING %    MONOCYTES PENDING %    EOSINOPHILS PENDING %    BASOPHILS PENDING %    IMMATURE GRANULOCYTES PENDING %    ABS. NEUTROPHILS PENDING K/UL    ABS. LYMPHOCYTES PENDING K/UL    ABS. MONOCYTES PENDING K/UL    ABS. EOSINOPHILS PENDING K/UL    ABS. BASOPHILS PENDING K/UL    ABS. IMM. GRANS.  PENDING K/UL    DF PENDING    C REACTIVE PROTEIN, QT    Collection Time: 10/20/21  9:02 AM   Result Value Ref Range    C-Reactive protein 9.72 (H) 0.00 - 6.02 mg/dL   METABOLIC PANEL, COMPREHENSIVE    Collection Time: 10/20/21  9:02 AM   Result Value Ref Range    Sodium 120 (L) 136 - 145 mmol/L    Potassium 3.9 3.5 - 5.1 mmol/L    Chloride 86 (L) 97 - 108 mmol/L    CO2 24 21 - 32 mmol/L    Anion gap 10 5 - 15 mmol/L    Glucose 72 65 - 100 mg/dL    BUN 3 (L) 6 - 20 mg/dL    Creatinine 0.26 (L) 0.55 - 1.02 mg/dL    BUN/Creatinine ratio 12 12 - 20      GFR est AA >60 >60 ml/min/1.73m2    GFR est non-AA >60 >60 ml/min/1.73m2    Calcium 8.2 (L) 8.5 - 10.1 mg/dL    Bilirubin, total 0.3 0.2 - 1.0 mg/dL    AST (SGOT) 15 15 - 37 U/L    ALT (SGPT) 9 (L) 12 - 78 U/L    Alk. phosphatase 101 45 - 117 U/L    Protein, total 6.5 6.4 - 8.2 g/dL    Albumin 1.9 (L) 3.5 - 5.0 g/dL    Globulin 4.6 (H) 2.0 - 4.0 g/dL    A-G Ratio 0.4 (L) 1.1 - 2.2           Assessment/Plan:     Altered mental status  Metabolic encephalopathy  Leukocytosis-Resolved  UTI  History of CAD status post CABG  Recent spinal surgery at G59 complicated by infection status post washout  Hypertension  Dementia  Depression  Hypothyroidism  Hyponatremia-Improving  GERD  Sepsis with UTI E. coli Klebsiella pneumonia  Thoracolumbar r spinal surgery with MRSA postoperative infection on vancomycin and rifampin      PLAN    Start on sodium chloride tablet  Repeat labs  Nephrology consult pending     Consult Ortho    Continue current medication on IV vancomycin and IV meropenem        Per ID:  2. Continue IV Meropenem 1 gm q8 for 4 more days for UTI (unable to switch to Levaquin because drug interaction with Aricept with QT prolongation)  4. Repeat urinalysis  5. Follow-up with her ID provider, Dr. Indiana Aguilar [phone], in [place] for her spine infection (Appt scheduled for Oct 26 at 2:20 pm)   6. Follow-up with Dr. Justo Nassar at discharge (he is aware of hospitalization)  7.  Cleared for discharge to [facility] from ID standpoint

## 2021-10-20 NOTE — PROGRESS NOTES
Day:07  Vanc trough was drawn wrong repeatedly after administering the dose. This supratherapeutic level is not the true trough. Continue the current regimen of Vanc 1000 mg every 8 hours. Patient is discharged to [facility]. Scheduled Trough on 10/22 at 1600.

## 2021-10-20 NOTE — CONSULTS
Consult Date: 10/20/2021    Consults    Subjective      70y F w/ pmhx remarkable for CAD s/p CABG, hx of T11 spinal surgery c/b cervicitis at present BIBEMS due to AMS; pt was at rehab center when she became altered and lethargic w/o reports of CP, SOB, N/V/D, dizziness, light headiness, dysuria, hematuria, fever or chills but reports of reduced oral intake while in rehab. Hospital course c/b Na+ at 120 on admission. Evaluated and seen at bedside, AOX3, NAD and speaking full sentences, routine labs showing Na+ at 118 w/o gross evidence of volume overload. Nephrology consulted in account of hyponatremia      Past Medical History:   Diagnosis Date    Dorsalgia     Intervertebral disc disease     Spinal stenosis       No past surgical history on file. No family history on file.    Social History     Tobacco Use    Smoking status: Not on file   Substance Use Topics    Alcohol use: Not on file       Current Facility-Administered Medications   Medication Dose Route Frequency Provider Last Rate Last Admin    hydrALAZINE (APRESOLINE) tablet 100 mg  100 mg Oral TID Estrella GANT MD   100 mg at 10/20/21 0930    lisinopriL (PRINIVIL, ZESTRIL) tablet 40 mg  40 mg Oral DAILY Rick Chicas MD   40 mg at 10/20/21 0939    HYDROcodone-acetaminophen (NORCO) 5-325 mg per tablet 1 Tablet  1 Tablet Oral Q6H PRN Rick Chicas MD   1 Tablet at 10/20/21 0954    traMADoL (ULTRAM) tablet 50 mg  50 mg Oral Q6H PRN Rick Chicas MD   50 mg at 10/20/21 0433    sodium chloride tablet 1 g  1 g Oral TID WITH MEALS Rick Chicas MD   1 g at 10/20/21 0925    vancomycin (VANCOCIN) 1,000 mg in 0.9% sodium chloride 250 mL (VIAL-MATE)  1,000 mg IntraVENous Q8H Oksana Mora  mL/hr at 10/20/21 0924 1,000 mg at 10/20/21 0924    carvediloL (COREG) tablet 25 mg  25 mg Oral BID WITH MEALS Ami TRUJILLO MD   25 mg at 10/20/21 0925    meropenem (MERREM) 1 g in sterile water (preservative free) 20 mL IV syringe  1 g IntraVENous Q8H Eusebio Hills MD   1 g at 10/20/21 0924    albuterol (PROVENTIL HFA, VENTOLIN HFA, PROAIR HFA) inhaler 2 Puff  2 Puff Inhalation Q6H PRN Mellissa Chicas MD        [facility]) capsule 30 mg  30 mg Oral TID Rick Chicas MD   30 mg at 10/20/21 0941    donepeziL (ARICEPT) tablet 10 mg  10 mg Oral QHS Rick Chicas MD   10 mg at 10/19/21 2203    DULoxetine (CYMBALTA) capsule 60 mg  60 mg Oral DAILY Rick Chicas MD   60 mg at 10/20/21 8163    escitalopram oxalate (LEXAPRO) tablet 20 mg  20 mg Oral DAILY Rick Chicas MD   20 mg at 10/20/21 8119    levothyroxine (SYNTHROID) tablet 150 mcg  150 mcg Oral ACB Rick Chicas MD   150 mcg at 10/20/21 7103    memantine (NAMENDA) tablet 10 mg  10 mg Oral BID Mellissa Chicas MD   10 mg at 10/20/21 0818    predniSONE (DELTASONE) tablet 5 mg  5 mg Oral DAILY WITH BREAKFAST Rick Chicas MD   5 mg at 10/20/21 8074    rifAMPin (RIFADIN) capsule 300 mg  300 mg Oral ACB&D Mellissa Chicas MD   300 mg at 10/20/21 6898    0.9% sodium chloride infusion  75 mL/hr IntraVENous CONTINUOUS Rick Chicas MD 75 mL/hr at 10/18/21 1800 75 mL/hr at 10/18/21 1800    acetaminophen (TYLENOL) tablet 650 mg  650 mg Oral Q6H PRN Mellissa Chicas MD   650 mg at 10/18/21 2123    Or    acetaminophen (TYLENOL) suppository 650 mg  650 mg Rectal Q6H PRN Mellissa Chicas MD        polyethylene glycol (MIRALAX) packet 17 g  17 g Oral DAILY PRN Mellissa Chicas MD        ondansetron (ZOFRAN ODT) tablet 4 mg  4 mg Oral Q8H PRN Rick Chicas MD        Or    ondansetron [facility]) injection 4 mg  4 mg IntraVENous Q6H PRN Mellissa Chicas MD        heparin (porcine) injection 5,000 Units  5,000 Units SubCUTAneous Barron Padgett MD   5,000 Units at 10/20/21 0510    Vancomycin Pharmacy Dosing   Other Rx Dosing/Monitoring Bin Tracey MD        hydrOXYzine (VISTARIL) injection 25 mg  25 mg IntraMUSCular Q6H PRN Juan Francisco, Zonia Ko MD   25 mg at 10/20/21 1003        Review of Systems   Constitutional: Negative for fever. HENT: Negative for facial swelling. Respiratory: Negative for chest tightness and shortness of breath. Cardiovascular: Negative for chest pain and palpitations. Gastrointestinal: Negative for abdominal distention, constipation, diarrhea, nausea and vomiting. Genitourinary: Negative for dysuria and hematuria. Musculoskeletal: Negative for joint swelling. Neurological: Negative for syncope, light-headedness and numbness. Psychiatric/Behavioral: Negative for confusion. Objective     Vital signs for last 24 hours:  Visit Vitals  BP (!) 176/73 (BP 1 Location: Left upper arm, BP Patient Position: At rest)   Pulse 75   Temp 97.5 °F (36.4 °C)   Resp 22   Ht 5' 8\" (1.727 m)   Wt 110 kg (242 lb 8.1 oz)   SpO2 96%   Breastfeeding No   BMI 36.87 kg/m²       Intake/Output this shift:  Current Shift: 10/20 0701 - 10/20 1900  In: -   Out: 1400 [Urine:1400]  Last 3 Shifts: 10/18 1901 - 10/20 0700  In: -   Out: 1700 [Urine:1700]    Data Review:   Recent Results (from the past 24 hour(s))   CBC WITH AUTOMATED DIFF    Collection Time: 10/20/21  9:02 AM   Result Value Ref Range    WBC 10.9 3.6 - 11.0 K/uL    RBC 3.23 (L) 3.80 - 5.20 M/uL    HGB 8.8 (L) 11.5 - 16.0 g/dL    HCT 27.1 (L) 35.0 - 47.0 %    MCV 83.9 80.0 - 99.0 FL    MCH 27.2 26.0 - 34.0 PG    MCHC 32.5 30.0 - 36.5 g/dL    RDW 15.0 (H) 11.5 - 14.5 %    PLATELET 888 (H) 991 - 400 K/uL    MPV 9.5 8.9 - 12.9 FL    NRBC 0.0 0.0  WBC    ABSOLUTE NRBC 0.00 0.00 - 0.01 K/uL    NEUTROPHILS PENDING %    LYMPHOCYTES PENDING %    MONOCYTES PENDING %    EOSINOPHILS PENDING %    BASOPHILS PENDING %    IMMATURE GRANULOCYTES PENDING %    ABS. NEUTROPHILS PENDING K/UL    ABS. LYMPHOCYTES PENDING K/UL    ABS. MONOCYTES PENDING K/UL    ABS. EOSINOPHILS PENDING K/UL    ABS. BASOPHILS PENDING K/UL    ABS. IMM. GRANS.  PENDING K/UL    DF PENDING    C REACTIVE PROTEIN, QT    Collection Time: 10/20/21  9:02 AM   Result Value Ref Range    C-Reactive protein 9.72 (H) 0.00 - 2.92 mg/dL   METABOLIC PANEL, COMPREHENSIVE    Collection Time: 10/20/21  9:02 AM   Result Value Ref Range    Sodium 120 (L) 136 - 145 mmol/L    Potassium 3.9 3.5 - 5.1 mmol/L    Chloride 86 (L) 97 - 108 mmol/L    CO2 24 21 - 32 mmol/L    Anion gap 10 5 - 15 mmol/L    Glucose 72 65 - 100 mg/dL    BUN 3 (L) 6 - 20 mg/dL    Creatinine 0.26 (L) 0.55 - 1.02 mg/dL    BUN/Creatinine ratio 12 12 - 20      GFR est AA >60 >60 ml/min/1.73m2    GFR est non-AA >60 >60 ml/min/1.73m2    Calcium 8.2 (L) 8.5 - 10.1 mg/dL    Bilirubin, total 0.3 0.2 - 1.0 mg/dL    AST (SGOT) 15 15 - 37 U/L    ALT (SGPT) 9 (L) 12 - 78 U/L    Alk. phosphatase 101 45 - 117 U/L    Protein, total 6.5 6.4 - 8.2 g/dL    Albumin 1.9 (L) 3.5 - 5.0 g/dL    Globulin 4.6 (H) 2.0 - 4.0 g/dL    A-G Ratio 0.4 (L) 1.1 - 2.2         Physical Exam  Vitals reviewed. Constitutional:       Appearance: Normal appearance. HENT:      Head: Atraumatic. Cardiovascular:      Rate and Rhythm: Normal rate and regular rhythm. Pulses: Normal pulses. Heart sounds: No murmur heard. No gallop. Pulmonary:      Effort: Pulmonary effort is normal. No respiratory distress. Breath sounds: Normal breath sounds. No stridor. No wheezing or rales. Abdominal:      General: Abdomen is flat. Musculoskeletal:         General: No swelling. Skin:     General: Skin is warm. Neurological:      General: No focal deficit present.          Current Facility-Administered Medications:     hydrALAZINE (APRESOLINE) tablet 100 mg, 100 mg, Oral, TID, Robbie GANT MD, 100 mg at 10/20/21 0930    lisinopriL (PRINIVIL, ZESTRIL) tablet 40 mg, 40 mg, Oral, DAILY, Rick Chicas MD, 40 mg at 10/20/21 0939    HYDROcodone-acetaminophen (NORCO) 5-325 mg per tablet 1 Tablet, 1 Tablet, Oral, Q6H PRN, Timothy Chicas MD, 1 Tablet at 10/20/21 0954    HYDROmorphone (DILAUDID) tablet 2 mg, 2 mg, Oral, Q4H PRN, Torrie Mahoney MD    traMADoL (ULTRAM) tablet 50 mg, 50 mg, Oral, Q6H PRN, Rick Chicas MD, 50 mg at 10/20/21 0433    sodium chloride tablet 1 g, 1 g, Oral, TID WITH MEALS, Rick Chicas MD, 1 g at 10/20/21 0925    vancomycin (VANCOCIN) 1,000 mg in 0.9% sodium chloride 250 mL (VIAL-MATE), 1,000 mg, IntraVENous, Q8H, Azalia Gomez MD, Last Rate: 250 mL/hr at 10/20/21 0924, 1,000 mg at 10/20/21 0924    carvediloL (COREG) tablet 25 mg, 25 mg, Oral, BID WITH MEALS, Mike Sargent MD, 25 mg at 10/20/21 0925    meropenem (MERREM) 1 g in sterile water (preservative free) 20 mL IV syringe, 1 g, IntraVENous, Q8H, Azalia Gomez MD, 1 g at 10/20/21 0924    albuterol (PROVENTIL HFA, VENTOLIN HFA, PROAIR HFA) inhaler 2 Puff, 2 Puff, Inhalation, Q6H PRN, Rick Chicas MD    [facility]) capsule 30 mg, 30 mg, Oral, TID, Rick Chicas MD, 30 mg at 10/20/21 0191    donepeziL (ARICEPT) tablet 10 mg, 10 mg, Oral, QHS, Rick Chicas MD, 10 mg at 10/19/21 2203    DULoxetine (CYMBALTA) capsule 60 mg, 60 mg, Oral, DAILY, Rick Chicas MD, 60 mg at 10/20/21 3657    escitalopram oxalate (LEXAPRO) tablet 20 mg, 20 mg, Oral, DAILY, Rick Chicas MD, 20 mg at 10/20/21 1709    levothyroxine (SYNTHROID) tablet 150 mcg, 150 mcg, Oral, ACB, Rick Chicas MD, 150 mcg at 10/20/21 9984    memantine (NAMENDA) tablet 10 mg, 10 mg, Oral, BID, Rick Chicas MD, 10 mg at 10/20/21 9788    predniSONE (DELTASONE) tablet 5 mg, 5 mg, Oral, DAILY WITH BREAKFAST, Rick Chicas MD, 5 mg at 10/20/21 3741    rifAMPin (RIFADIN) capsule 300 mg, 300 mg, Oral, ACB&D, Rick Chicas MD, 300 mg at 10/20/21 2312    acetaminophen (TYLENOL) tablet 650 mg, 650 mg, Oral, Q6H PRN, 650 mg at 10/18/21 2123 **OR** acetaminophen (TYLENOL) suppository 650 mg, 650 mg, Rectal, Q6H PRN, Rick Chicas MD    polyethylene glycol (MIRALAX) packet 17 g, 17 g, Oral, DAILY PRN, Clara Mayes MD    ondansetron [facility] ODT) tablet 4 mg, 4 mg, Oral, Q8H PRN **OR** ondansetron (ZOFRAN) injection 4 mg, 4 mg, IntraVENous, Q6H PRN, Rick Chicas MD    heparin (porcine) injection 5,000 Units, 5,000 Units, SubCUTAneous, Q8H, Rick Chicas MD, 5,000 Units at 10/20/21 0510    Vancomycin Pharmacy Dosing, , Other, Rx Dosing/Monitoring, Danielle Chicas MD    hydrOXYzine (VISTARIL) injection 25 mg, 25 mg, IntraMUSCular, Q6H PRN, Rick Chicas MD, 25 mg at 10/20/21 1293  Recent Results (from the past 24 hour(s))   CBC WITH AUTOMATED DIFF    Collection Time: 10/20/21  9:02 AM   Result Value Ref Range    WBC 10.9 3.6 - 11.0 K/uL    RBC 3.23 (L) 3.80 - 5.20 M/uL    HGB 8.8 (L) 11.5 - 16.0 g/dL    HCT 27.1 (L) 35.0 - 47.0 %    MCV 83.9 80.0 - 99.0 FL    MCH 27.2 26.0 - 34.0 PG    MCHC 32.5 30.0 - 36.5 g/dL    RDW 15.0 (H) 11.5 - 14.5 %    PLATELET 547 (H) 046 - 400 K/uL    MPV 9.5 8.9 - 12.9 FL    NRBC 0.0 0.0  WBC    ABSOLUTE NRBC 0.00 0.00 - 0.01 K/uL    NEUTROPHILS PENDING %    LYMPHOCYTES PENDING %    MONOCYTES PENDING %    EOSINOPHILS PENDING %    BASOPHILS PENDING %    IMMATURE GRANULOCYTES PENDING %    ABS. NEUTROPHILS PENDING K/UL    ABS. LYMPHOCYTES PENDING K/UL    ABS. MONOCYTES PENDING K/UL    ABS. EOSINOPHILS PENDING K/UL    ABS. BASOPHILS PENDING K/UL    ABS. IMM. GRANS.  PENDING K/UL    DF PENDING    C REACTIVE PROTEIN, QT    Collection Time: 10/20/21  9:02 AM   Result Value Ref Range    C-Reactive protein 9.72 (H) 0.00 - 1.53 mg/dL   METABOLIC PANEL, COMPREHENSIVE    Collection Time: 10/20/21  9:02 AM   Result Value Ref Range    Sodium 120 (L) 136 - 145 mmol/L    Potassium 3.9 3.5 - 5.1 mmol/L    Chloride 86 (L) 97 - 108 mmol/L    CO2 24 21 - 32 mmol/L    Anion gap 10 5 - 15 mmol/L    Glucose 72 65 - 100 mg/dL    BUN 3 (L) 6 - 20 mg/dL    Creatinine 0.26 (L) 0.55 - 1.02 mg/dL    BUN/Creatinine ratio 12 12 - 20      GFR est AA >60 >60 ml/min/1.73m2    GFR est non-AA >60 >60 ml/min/1.73m2    Calcium 8.2 (L) 8.5 - 10.1 mg/dL    Bilirubin, total 0.3 0.2 - 1.0 mg/dL    AST (SGOT) 15 15 - 37 U/L    ALT (SGPT) 9 (L) 12 - 78 U/L    Alk. phosphatase 101 45 - 117 U/L    Protein, total 6.5 6.4 - 8.2 g/dL    Albumin 1.9 (L) 3.5 - 5.0 g/dL    Globulin 4.6 (H) 2.0 - 4.0 g/dL    A-G Ratio 0.4 (L) 1.1 - 2.2         Assessment     Plan     1. Chronic Asymptomatic hyponatremia - euvolemic at present  Na+ on admission 120 ~ At present 118  Possible causes poor solute intake vs  ADH release from pain vs hypothyroidism  New urine studies and additional w/u requested  C/w strict I/O   C/w fluid restriction <1L/d and salt tablets 1g PO TID for now  Encourage PO intake  Avoid over correction 6-8meq/d  BMP trend for additional monitoring    2. Hx of Recent spinal surgery - pain control as per primary team     3. HTN -  C/w nifedipine 30mg PO daily for now    4. Sepsis c/b MRSA spinal infection and E. Coli and Klebsiela UTI - Maintain MAP > 65   C/w renally adjusted antibiotics.      Signed By: Angel Griggs MD     October 20, 2021

## 2021-10-20 NOTE — PROGRESS NOTES
Progress Note    Patient: Tyson Crabtree MRN: [id number]  SSN: [id number]    YOB: 1950  Age: 73 y.o. Sex: female      Admit Date: 10/14/2021    LOS: 6 days     Subjective:   Patient followed sepsis with suspected UTI and ongoing post-operative infection from spinal surgery elsewhere, secondary to MRSA. She remains afebrile with decreasing WBC,  ESR and CRP. Urine culture grew E. Coli and Klebsiella. Currently on IV Vancomycin, Rifampin, Meropenem. Patient resting comfortably with no new complaints. Serum sodium has increased slightly. Objective:     Vitals:    10/19/21 2046 10/19/21 2202 10/20/21 0826 10/20/21 1601   BP: (!) 123/52 128/65 (!) 176/73 (!) 121/57   Pulse: 67  75 72   Resp: 18 22 22   Temp: 97 °F (36.1 °C)  97.5 °F (36.4 °C) 98.9 °F (37.2 °C)   SpO2: 96%  96% 93%   Weight:       Height:            Intake and Output:  Current Shift: 10/20 0701 - 10/20 1900  In: -   Out: 1400 [HJQAS:0732]  Last three shifts: 10/18 1901 - 10/20 0700  In: -   Out: 1700 [Urine:1700]    Physical Exam:   Vitals and nursing note reviewed. Constitutional:       General: She is not in acute distress. Appearance: She is obese. She is ill-appearing. Genitourinary:     Comments: Shafer catheter  Musculoskeletal:         General: Tenderness (lower back with well-healed incision) present. Cervical back: Neck supple. Skin:     Findings: No rash. Neurological:      General: No focal deficit present. Mental Status: She is alert and oriented to person, place, and time. Psychiatric:         Behavior: Behavior normal.      Lab/Data Review:     WBC 10,900    Vancomycin trough 8.8    CRP 9.72 < 7.17 <6.66 <8.50   <79 <103    Blood cultures (10/14)  Coagulase negative Staphylococcal species  Urine culture (10/14) >100,000 cfu/ml E.  Coli and Klebsiella pneumoniae sensitive to Levaquin  Assessment:     Active Problems:    UTI (urinary tract infection) (10/14/2021)      Sepsis ([address] (10/14/2021)    1. Sepsis with leukocytosis, elevated ESR, CRP  2. UTI, uncomplicated so far, with marked pyuria and bacteriuria, secondary to E. Coli and Klebsiella pneumoniae, Day #7/10 IV Meropenem  3. Thoracolumbar spinal surgery with MRSA post-operative infection, on Vancomycin and Rifampin  4. Positive blood cultures with Coagulase negative Staphylococci, probably contaminants  5. Abnormal CXR with lingular infiltrate, clinical significance unclear  6. Multiple antibiotic allergies including penicillins, cephalosporins, sulfa drugs, tetracycline, macrolides     Comment:  WBC now normal but CRP and ESR have increased today. Repeat urinalysis ordered to rule out Candida superinfection. Plan:   1. Continue IV Vancomycin and Rifampin for MRSA infection, Day #35/42 (per her ID providers)  2. Continue IV Meropenem 1 gm q8 for 3 more days for UTI (unable to switch to Levaquin because drug interaction with Aricept with QT prolongation)  3. In am, repeat CBC, CRP, ESR   4. Reorder urinalysis  5. Follow-up with her ID provider, Dr. Goode Spine [phone], in [place] for her spine infection (Appt scheduled for Oct 26 at 2:20 pm)   6. Follow-up with Dr. Douglas Ledbetter at discharge (he is aware of hospitalization)  7.  Cleared for discharge to [facility] By: Karley Nguyen MD     October 20, 2021

## 2021-10-20 NOTE — PROGRESS NOTES
Attempted to see patient for OT evaluation. Patient received supine in bed, calling out for assistance. Patient reported stomach/abdominal pain! Repositioned patient. Nursing entered room shortly. Will attempt evaluation next visit pending patient availability/status.

## 2021-10-21 VITALS
SYSTOLIC BLOOD PRESSURE: 143 MMHG | HEIGHT: 68 IN | WEIGHT: 242.51 LBS | DIASTOLIC BLOOD PRESSURE: 67 MMHG | TEMPERATURE: 97.7 F | RESPIRATION RATE: 20 BRPM | HEART RATE: 71 BPM | BODY MASS INDEX: 36.75 KG/M2 | OXYGEN SATURATION: 96 %

## 2021-10-21 PROBLEM — E87.1 HYPONATREMIA: Status: ACTIVE | Noted: 2021-10-21

## 2021-10-21 LAB
ALBUMIN SERPL-MCNC: 1.8 G/DL (ref 3.5–5)
ALBUMIN/GLOB SERPL: 0.4 {RATIO} (ref 1.1–2.2)
ALP SERPL-CCNC: 100 U/L (ref 45–117)
ALT SERPL-CCNC: 11 U/L (ref 12–78)
ANION GAP SERPL CALC-SCNC: 9 MMOL/L (ref 5–15)
AST SERPL W P-5'-P-CCNC: 13 U/L (ref 15–37)
BASOPHILS # BLD: 0.1 K/UL (ref 0–0.1)
BASOPHILS NFR BLD: 1 % (ref 0–1)
BILIRUB SERPL-MCNC: 0.2 MG/DL (ref 0.2–1)
BUN SERPL-MCNC: 3 MG/DL (ref 6–20)
BUN/CREAT SERPL: 11 (ref 12–20)
CA-I BLD-MCNC: 8.2 MG/DL (ref 8.5–10.1)
CHLORIDE SERPL-SCNC: 90 MMOL/L (ref 97–108)
CO2 SERPL-SCNC: 25 MMOL/L (ref 21–32)
CREAT SERPL-MCNC: 0.28 MG/DL (ref 0.55–1.02)
CRP SERPL-MCNC: 9.62 MG/DL (ref 0–0.6)
DIFFERENTIAL METHOD BLD: ABNORMAL
EOSINOPHIL # BLD: 0.3 K/UL (ref 0–0.4)
EOSINOPHIL NFR BLD: 3 % (ref 0–7)
ERYTHROCYTE [DISTWIDTH] IN BLOOD BY AUTOMATED COUNT: 15.5 % (ref 11.5–14.5)
ERYTHROCYTE [SEDIMENTATION RATE] IN BLOOD: 109 MM/HR
GLOBULIN SER CALC-MCNC: 4.3 G/DL (ref 2–4)
GLUCOSE SERPL-MCNC: 73 MG/DL (ref 65–100)
HCT VFR BLD AUTO: 25.2 % (ref 35–47)
HGB BLD-MCNC: 8.1 G/DL (ref 11.5–16)
IMM GRANULOCYTES # BLD AUTO: 0.3 K/UL (ref 0–0.04)
IMM GRANULOCYTES NFR BLD AUTO: 3 % (ref 0–0.5)
LYMPHOCYTES # BLD: 2 K/UL (ref 0.8–3.5)
LYMPHOCYTES NFR BLD: 22 % (ref 12–49)
MCH RBC QN AUTO: 27.2 PG (ref 26–34)
MCHC RBC AUTO-ENTMCNC: 32.1 G/DL (ref 30–36.5)
MCV RBC AUTO: 84.6 FL (ref 80–99)
MONOCYTES # BLD: 0.8 K/UL (ref 0–1)
MONOCYTES NFR BLD: 9 % (ref 5–13)
NEUTS SEG # BLD: 5.6 K/UL (ref 1.8–8)
NEUTS SEG NFR BLD: 62 % (ref 32–75)
NRBC # BLD: 0 K/UL (ref 0–0.01)
NRBC BLD-RTO: 0 PER 100 WBC
OSMOLALITY SERPL: 244 MOSM/KG H2O
PLATELET # BLD AUTO: 679 K/UL (ref 150–400)
PMV BLD AUTO: 9.8 FL (ref 8.9–12.9)
POTASSIUM SERPL-SCNC: 3.8 MMOL/L (ref 3.5–5.1)
PROT SERPL-MCNC: 6.1 G/DL (ref 6.4–8.2)
RBC # BLD AUTO: 2.98 M/UL (ref 3.8–5.2)
SODIUM SERPL-SCNC: 124 MMOL/L (ref 136–145)
WBC # BLD AUTO: 9.1 K/UL (ref 3.6–11)

## 2021-10-21 PROCEDURE — 74011250636 HC RX REV CODE- 250/636: Performed by: INTERNAL MEDICINE

## 2021-10-21 PROCEDURE — 80053 COMPREHEN METABOLIC PANEL: CPT

## 2021-10-21 PROCEDURE — 74011250637 HC RX REV CODE- 250/637: Performed by: INTERNAL MEDICINE

## 2021-10-21 PROCEDURE — 36415 COLL VENOUS BLD VENIPUNCTURE: CPT

## 2021-10-21 PROCEDURE — 85652 RBC SED RATE AUTOMATED: CPT

## 2021-10-21 PROCEDURE — 86140 C-REACTIVE PROTEIN: CPT

## 2021-10-21 PROCEDURE — 74011636637 HC RX REV CODE- 636/637: Performed by: FAMILY MEDICINE

## 2021-10-21 PROCEDURE — 99232 SBSQ HOSP IP/OBS MODERATE 35: CPT | Performed by: INTERNAL MEDICINE

## 2021-10-21 PROCEDURE — 74011000250 HC RX REV CODE- 250: Performed by: INTERNAL MEDICINE

## 2021-10-21 PROCEDURE — 85025 COMPLETE CBC W/AUTO DIFF WBC: CPT

## 2021-10-21 PROCEDURE — 74011250636 HC RX REV CODE- 250/636: Performed by: FAMILY MEDICINE

## 2021-10-21 PROCEDURE — 74011250637 HC RX REV CODE- 250/637: Performed by: FAMILY MEDICINE

## 2021-10-21 RX ORDER — HYDRALAZINE HYDROCHLORIDE 100 MG/1
100 TABLET, FILM COATED ORAL 3 TIMES DAILY
Qty: 30 TABLET | Refills: 0 | Status: SHIPPED | OUTPATIENT
Start: 2021-10-21

## 2021-10-21 RX ORDER — HYDROMORPHONE HYDROCHLORIDE 2 MG/1
2 TABLET ORAL
Qty: 10 TABLET | Refills: 0 | Status: SHIPPED | OUTPATIENT
Start: 2021-10-21 | End: 2021-10-24

## 2021-10-21 RX ADMIN — HYDRALAZINE HYDROCHLORIDE 100 MG: 50 TABLET, FILM COATED ORAL at 17:23

## 2021-10-21 RX ADMIN — CARVEDILOL 25 MG: 12.5 TABLET, FILM COATED ORAL at 17:24

## 2021-10-21 RX ADMIN — HYDROMORPHONE HYDROCHLORIDE 2 MG: 2 TABLET ORAL at 02:38

## 2021-10-21 RX ADMIN — LISINOPRIL 40 MG: 40 TABLET ORAL at 09:43

## 2021-10-21 RX ADMIN — CEVIMELINE 30 MG: 30 CAPSULE ORAL at 09:44

## 2021-10-21 RX ADMIN — CEVIMELINE 30 MG: 30 CAPSULE ORAL at 16:00

## 2021-10-21 RX ADMIN — MEMANTINE HYDROCHLORIDE 10 MG: 10 TABLET ORAL at 09:43

## 2021-10-21 RX ADMIN — SODIUM CHLORIDE 1000 MG: 9 INJECTION, SOLUTION INTRAVENOUS at 09:51

## 2021-10-21 RX ADMIN — SODIUM CHLORIDE 1000 MG: 9 INJECTION, SOLUTION INTRAVENOUS at 02:39

## 2021-10-21 RX ADMIN — CARVEDILOL 25 MG: 12.5 TABLET, FILM COATED ORAL at 09:43

## 2021-10-21 RX ADMIN — Medication 1 G: at 12:07

## 2021-10-21 RX ADMIN — PREDNISONE 5 MG: 5 TABLET ORAL at 09:43

## 2021-10-21 RX ADMIN — Medication 1 G: at 09:45

## 2021-10-21 RX ADMIN — HYDROMORPHONE HYDROCHLORIDE 2 MG: 2 TABLET ORAL at 11:28

## 2021-10-21 RX ADMIN — RIFAMPIN 300 MG: 300 CAPSULE ORAL at 17:23

## 2021-10-21 RX ADMIN — HEPARIN SODIUM 5000 UNITS: 5000 INJECTION INTRAVENOUS; SUBCUTANEOUS at 06:41

## 2021-10-21 RX ADMIN — HYDROMORPHONE HYDROCHLORIDE 2 MG: 2 TABLET ORAL at 06:41

## 2021-10-21 RX ADMIN — Medication 1 G: at 17:23

## 2021-10-21 RX ADMIN — MEROPENEM 1 G: 1 INJECTION, POWDER, FOR SOLUTION INTRAVENOUS at 17:24

## 2021-10-21 RX ADMIN — MEROPENEM 1 G: 1 INJECTION, POWDER, FOR SOLUTION INTRAVENOUS at 01:24

## 2021-10-21 RX ADMIN — HYDROCODONE BITARTRATE AND ACETAMINOPHEN 1 TABLET: 5; 325 TABLET ORAL at 05:04

## 2021-10-21 RX ADMIN — RIFAMPIN 300 MG: 300 CAPSULE ORAL at 09:43

## 2021-10-21 RX ADMIN — HYDRALAZINE HYDROCHLORIDE 100 MG: 50 TABLET, FILM COATED ORAL at 09:43

## 2021-10-21 RX ADMIN — MEROPENEM 1 G: 1 INJECTION, POWDER, FOR SOLUTION INTRAVENOUS at 09:44

## 2021-10-21 RX ADMIN — DULOXETINE 60 MG: 30 CAPSULE, DELAYED RELEASE ORAL at 09:43

## 2021-10-21 RX ADMIN — HYDROMORPHONE HYDROCHLORIDE 2 MG: 2 TABLET ORAL at 17:29

## 2021-10-21 RX ADMIN — LEVOTHYROXINE SODIUM 175 MCG: 0.1 TABLET ORAL at 09:43

## 2021-10-21 RX ADMIN — ESCITALOPRAM OXALATE 20 MG: 10 TABLET ORAL at 09:44

## 2021-10-21 NOTE — PROGRESS NOTES
DC order in system for patient to go back to SNF and continue IV ABX at facility. Patient will go to [facility] and [address] [place] [address], room 28B and nurse can call report to [phone]. CM called patient spouse, Gege Lee @ [phone] and notified of acceptance and room number. Discharge plan of care/case management plan validated with provider discharge order.

## 2021-10-21 NOTE — PROGRESS NOTES
Bedside shift change report given to Maritza Arellano RN (oncoming nurse) by Sanjeev Flores RN (offgoing nurse). Report included the following information SBAR, Kardex, Intake/Output, MAR, Accordion, and Recent Results.

## 2021-10-21 NOTE — PROGRESS NOTES
Discharge plan of care/case management plan validated with provider discharge order. I have reviewed discharge instructions with the patient and caregiver. The patient and caregiver verbalized understanding. Nurse included in discharge instructions a summary of care doc for follow-up appointment with PCP. Discharge medications reviewed with patient and caregiver and appropriate educational materials and side effects teaching were provided. Nursing informed patient that prescriptions were sent to preferred pharmacy. Discussed with the patient and all questioned fully answered. Telemetry removed and returned to unit. No c/o pain, n/v/d. No s/s of respiratory distress. Patient assisted with gathering personal belongings. Verbal report given to Pio MADERA  by Lia Fountain RN (offgoing nurse). Report included the following information SBAR, MAR, Recent Results and Med Rec Status.

## 2021-10-21 NOTE — ASSESSMENT & PLAN NOTE
Urinalysis significant for 1.024/5.0/100 protein  Low uric acid at 2.2  Reports average intake of about 3 liters of  Fluids  Reports 1 week of darkening urine    Noted to have tall glass of soda half drunk next to her    Plan:   Patient may have had inappropriate ADH secretion in response to acute illness.  If remains hyponatremic after clinical stabilization, may represent SIADH  Obtain urine electrolytes to determine urine free water exretion and U(na+k)/serum Na ratio  Can continue with fluid restriction and high protein diet  Obtain urine osmolality

## 2021-10-21 NOTE — PROGRESS NOTES
Progress Note    Patient: Eliberto Schilder MRN: [id number]  SSN: [id number]    YOB: 1950  Age: 73 y.o. Sex: female      Admit Date: 10/14/2021    LOS: 7 days     Subjective:   Patient followed sepsis with suspected UTI and ongoing post-operative infection from spinal surgery elsewhere, secondary to MRSA. She remains afebrile with normal WBC, CRP but ESR increased. Urine culture grew E. Coli and Klebsiella. Currently on IV Vancomycin, Rifampin, Meropenem. Patient resting comfortably with no new complaints. She is resting comfortably with no new complaints. She is scheduled to return to [facility]. Objective:     Vitals:    10/19/21 2202 10/20/21 0826 10/20/21 1601 10/20/21 2151   BP: 128/65 (!) 176/73 (!) 121/57 (!) 140/63   Pulse:  75 72 70   Resp:  22 22 20   Temp:  97.5 °F (36.4 °C) 98.9 °F (37.2 °C) 97.7 °F (36.5 °C)   SpO2:  96% 93% 96%   Weight:       Height:            Intake and Output:  Current Shift: No intake/output data recorded. Last three shifts: 10/19 1901 - 10/21 0700  In: -   Out: 2850 [Urine:2850]    Physical Exam:   Vitals and nursing note reviewed. Constitutional:       General: She is not in acute distress. Appearance: She is obese. She is ill-appearing. Genitourinary:     Comments: Shafer catheter  Musculoskeletal:         General: Tenderness (lower back with well-healed incision) present. Cervical back: Neck supple. Skin:     Findings: No rash. Neurological:      General: No focal deficit present. Mental Status: She is alert and oriented to person, place, and time. Psychiatric:         Behavior: Behavior normal.      Lab/Data Review:     WBC 9,100    Vancomycin trough 8.8    CRP 9.62 <9.72 < 7.17 <6.66 <8.50   <100 <79 <103    Blood cultures (10/14)  Coagulase negative Staphylococcal species  Urine culture (10/14) >100,000 cfu/ml E.  Coli and Klebsiella pneumoniae sensitive to Levaquin  Assessment:     Active Problems:    UTI (urinary tract infection) (10/14/2021)      Sepsis ([address] (10/14/2021)      Hyponatremia (10/21/2021)    1. Sepsis with leukocytosis, elevated ESR, CRP  2. UTI, uncomplicated so far, with marked pyuria and bacteriuria, secondary to E. Coli and Klebsiella pneumoniae, Day #8/10 IV Meropenem  3. Thoracolumbar spinal surgery with MRSA post-operative infection, on Day #36/42 Vancomycin and Rifampin  4. Positive blood cultures with Coagulase negative Staphylococci, probably contaminants  5. Abnormal CXR with lingular infiltrate, clinical significance unclear  6. Multiple antibiotic allergies including penicillins, cephalosporins, sulfa drugs, tetracycline, macrolides     Comment:  WBC normal with decreasing CRP but ESR still increasing. Repeat urinalysis ordered two days in a row but has still not been sent. Attempting to rule out Candida UTI. Plan:   1. Continue IV Vancomycin and Rifampin for MRSA infection for 6 more days (per her ID providers)  2. Continue IV Meropenem 1 gm q8 for 2 more days for UTI (unable to switch to Levaquin because drug interaction with Aricept with QT prolongation)  3. Follow-up with her ID provider, Dr. Nellie Parker [phone], in [place] for her spine infection (Appt scheduled for Oct 26 at 2:20 pm)   5. Follow-up with Dr. Kan Arce at discharge (he is aware of hospitalization)  7.  Cleared for discharge to [facility] from [facility] By: Luis Angel Suggs MD     October 21, 2021

## 2021-10-21 NOTE — PROGRESS NOTES
Progress Note  Date:10/21/2021       Room:[facility]  Patient Name:Annalise Gaines     YOB: 1950     Age:71 y.o. Subjective    Subjective:  Symptoms:  (Improved mentation  Na improved to 124  ). Diet:  Adequate intake. Pain:  She reports no pain. Review of Systems  Objective         Vitals Last 24 Hours:  TEMPERATURE:  Temp  Av.3 °F (36.8 °C)  Min: 97.7 °F (36.5 °C)  Max: 98.9 °F (37.2 °C)  RESPIRATIONS RANGE: Resp  Av  Min: 20  Max: 22  PULSE OXIMETRY RANGE: SpO2  Av.5 %  Min: 93 %  Max: 96 %  PULSE RANGE: Pulse  Av  Min: 70  Max: 72  BLOOD PRESSURE RANGE: Systolic (53RID), GTW:342 , Min:121 , ZCL:647   ; Diastolic (48PHU), IPX:14, Min:57, Max:63    I/O (24Hr): Intake/Output Summary (Last 24 hours) at 10/21/2021 1318  Last data filed at 10/21/2021 0534  Gross per 24 hour   Intake    Output 500 ml   Net -500 ml     Objective:  Vital signs: (most recent): Blood pressure (!) 140/63, pulse 70, temperature 97.7 °F (36.5 °C), resp. rate 20, height 5' 8\" (1.727 m), weight 110 kg (242 lb 8.1 oz), SpO2 96 %, not currently breastfeeding. Vital signs are normal.    Lungs:  Normal effort. Heart: Normal rate. Neurological: Patient is oriented to person, place and time. Labs/Imaging/Diagnostics    Labs:  CBC:  Recent Labs     10/21/21  0648 10/20/21  0902 10/19/21  0851   WBC 9.1 10.9 12.8*   RBC 2.98* 3.23* 3.42*   HGB 8.1* 8.8* 9.4*   HCT 25.2* 27.1* 28.4*   MCV 84.6 83.9 83.0   RDW 15.5* 15.0* 15.2*   * 719* 788*     CHEMISTRIES:  Recent Labs     10/21/21  0648 10/20/21  0902 10/19/21  0851   * 120* 118*   K 3.8 3.9 4.1   CL 90* 86* 86*   CO2    BUN 3* 3* 4*   CA 8.2* 8.2* 8.2*   PT/INR:No results for input(s): INR, INREXT in the last 72 hours. No lab exists for component: PROTIME  APTT:No results for input(s): APTT in the last 72 hours.   LIVER PROFILE:  Recent Labs     10/21/21  7726 10/20/21  0336 10/19/21  0851   AST 13* 15 14*   ALT 11* 9* 12     Lab Results   Component Value Date/Time    ALT (SGPT) 11 (L) 10/21/2021 06:48 AM    AST (SGOT) 13 (L) 10/21/2021 06:48 AM    Alk. phosphatase 100 10/21/2021 06:48 AM    Bilirubin, total 0.2 10/21/2021 06:48 AM       Imaging Last 24 Hours:  No results found. Assessment//Plan   Active Problems:    UTI (urinary tract infection) (10/14/2021)      Sepsis ([address] (10/14/2021)      Hyponatremia (10/21/2021)      Assessment:  (Hyponatremia  Urinalysis significant for 1.024/5.0/100 protein  Low uric acid at 2.2  Reports average intake of about 3 liters of  Fluids  Reports 1 week of darkening urine    Noted to have tall glass of soda half drunk next to her    Plan:   Patient may have had inappropriate ADH secretion in response to acute   illness. If remains hyponatremic after clinical stabilization, may   represent SIADH  Obtain urine electrolytes to determine urine free water exretion and   U(na+k)/serum Na ratio  Can continue with fluid restriction and high protein diet  Obtain urine osmolality  ).        Electronically signed by Raymond Preston MD on 10/21/2021 at 1:18 PM

## 2021-10-21 NOTE — DISCHARGE INSTRUCTIONS
Patient Education        Altered Mental Status: Care Instructions  Your Care Instructions     Altered mental status is a change in how well your brain is working. As a result, you may be confused, be less alert than usual, or act in odd ways. This may include seeing or hearing things that aren't really there (hallucinations). A mental status change has many possible causes. For example, it may be the result of an infection, an imbalance of chemicals in the body, or a chronic disease such as diabetes or COPD. It can also be caused by things such as a head injury, taking certain medicines, or using alcohol or drugs. The doctor may do tests to look for the cause. These tests may include urine tests, blood tests, and imaging tests such as a CT scan. Sometimes a clear cause isn't found. But tests can help the doctor rule out a serious cause of your symptoms. A change in mental status can be scary. But mental status will often return to normal when the cause is treated. So it is important to get any follow-up testing or treatment the doctor has suggested. The doctor has checked you carefully, but problems can develop later. If you notice any problems or new symptoms, get medical treatment right away. Follow-up care is a key part of your treatment and safety. Be sure to make and go to all appointments, and call your doctor if you are having problems. It's also a good idea to know your test results and keep a list of the medicines you take. How can you care for yourself at home? · Be safe with medicines. Take your medicines exactly as prescribed. Call your doctor if you think you are having a problem with your medicine. · Have another adult stay with you until you are better. This can help keep you safe. Ask that person to watch for signs that your mental status is getting worse. When should you call for help? Call 911 anytime you think you may need emergency care.  For example, call if:    · You passed out (lost consciousness). Call your doctor now or seek immediate medical care if:    · Your mental status is getting worse.     · You have new symptoms, such as a fever, chills, or shortness of breath.     · You do not feel safe. Watch closely for changes in your health, and be sure to contact your doctor if:    · You do not get better as expected. Where can you learn more? Go to http://www.barnett.com/  Enter J452 in the search box to learn more about \"Altered Mental Status: Care Instructions. \"  Current as of: April 8, 2021               Content Version: 13.0  © 9518-3563 [employer]. Care instructions adapted under license by [employer] (which disclaims liability or warranty for this information). If you have questions about a medical condition or this instruction, always ask your healthcare professional. [address] any warranty or liability for your use of this information. Patient Education        Hyponatremia: Care Instructions  Your Care Instructions     Hyponatremia (say \"tf-[id number]-cdu-TREE-billy-uh\") means that you don't have enough sodium in your blood. It can cause nausea, vomiting, and headaches. Or you may not feel hungry. In serious cases, it can cause seizures, a coma, or even death. Hyponatremia is not a disease. It is a problem caused by something else, such as medicines or exercising for a long time in hot weather. You can get hyponatremia if you lose a lot of fluids and then you drink a lot of water or other liquids that don't have much sodium. You can also get it if you have kidney, liver, heart, or other health problems. Treatment is focused on getting your sodium levels back to normal.  Follow-up care is a key part of your treatment and safety. Be sure to make and go to all appointments, and call your doctor if you are having problems.  It's also a good idea to know your test results and keep a list of the medicines you take. How can you care for yourself at home? · If your doctor recommends it, drink fluids that have sodium. Sports drinks are a good choice. Or you can eat salty foods. · If your doctor recommends it, limit the amount of water you drink. And limit fluids that are mostly water. These include tea, coffee, and juice. · Take your medicines exactly as prescribed. Call your doctor if you have any problems with your medicine. · Get your sodium levels tested when your doctor tells you to. When should you call for help? Call 911 anytime you think you may need emergency care. For example, call if:    · You have a seizure.     · You passed out (lost consciousness). Call your doctor now or seek immediate medical care if:    · You are confused or it is hard to focus.     · You have little or no appetite.     · You feel sick to your stomach or you vomit.     · You have a headache.     · You have mood changes.     · You feel more tired than usual.   Watch closely for changes in your health, and be sure to contact your doctor if:    · You do not get better as expected. Where can you learn more? Go to http://www.gray.com/  Enter U075 in the search box to learn more about \"Hyponatremia: Care Instructions. \"  Current as of: June 17, 2021               Content Version: 13.0  © 2006-2021 [employer]. Care instructions adapted under license by [employer] (which disclaims liability or warranty for this information). If you have questions about a medical condition or this instruction, always ask your healthcare professional. [place] [address] any warranty or liability for your use of this information. Patient Education        Sepsis: Care Instructions  Overview     Sepsis is an intense reaction to an infection. It can cause damage to the body and lead to dangerously low blood pressure. You may have inflammation across large areas of your body.  It can damage tissue and even go deep into your organs. Infections that can lead to sepsis include:  · A skin infection such as from a cut. · A lung infection like pneumonia. · A kidney infection. · A gut infection such as E. coli. Sepsis is treated with antibiotics. Your doctor will try to find the infection that led to sepsis. Malgorzata Mattson also get fluids through a vein (IV). Machines will track your vital signs, including temperature, blood pressure, breathing rate, and pulse rate. The physical and mental effects of sepsis may not be seen for several weeks after treatment. And they may last long after the infection is gone. Physical problems may include:  · Feeling weak and tired. · Feeling out of breath. · Aches and pains. · Problems with getting around. · Trouble falling asleep or staying asleep. · Dry and itchy skin, brittle nails, and hair loss. Some of these effects can lead to problems with your organs or your feet, legs, hands, or arms. Sepsis can also affect your mind and emotions. Problems may include:  · Self-doubt. · Anxiety. · Nightmares. · Depression and mood problems. · Wanting to avoid other people. · Confusion. · Flashbacks and bad memories of your illness. It's important to care for yourself and try to avoid infections. This may lower your risk of getting sepsis again. Follow-up care is a key part of your treatment and safety. Be sure to make and go to all appointments, and call your doctor if you are having problems. It's also a good idea to know your test results and keep a list of the medicines you take. How can you care for yourself at home? · Be safe with medicines. Take your medicines exactly as prescribed. Call your doctor if you think you are having a problem with your medicine. · If your doctor prescribed antibiotics, take them as directed. Do not stop taking them just because you feel better. You need to take the full course of antibiotics.   · Help prevent infections that could again lead to sepsis. ? Try to avoid colds and flu. If you must be around people who have a cold or the flu, wash your hands often. And get a flu vaccine every year. ? Ask your doctor if you need a pneumococcal vaccine (to prevent pneumonia, meningitis, and other infections). If you have had one before, ask your doctor if you need another dose. ? Clean any wounds or scrapes. · Do not smoke or use other tobacco products. When you quit smoking, you are less likely to get a cold, the flu, bronchitis, and pneumonia. If you need help quitting, talk to your doctor about stop-smoking programs and medicines. These can increase your chances of quitting for good. · Drink plenty of fluids to prevent dehydration. Choose water and other clear liquids until you feel better. If you have kidney, heart, or liver disease and have to limit fluids, talk with your doctor before you increase the amount of fluids you drink. · Eat a healthy diet. Include fruits, vegetables, and whole grains in your diet every day. · If your doctor recommends it, try doing some physical activity. Walking is a good choice. Bit by bit, increase the amount you walk every day. · Talk with your family and friends about your challenges. Ask for help if you need it. · Keep a journal. Writing down your thoughts and feelings can help reduce your stress. · Ask family members to fill in gaps in your memory. · Set small goals for yourself that you can reach. Reward yourself for success. When should you call for help? Call 911  anytime you think you may need emergency care. For example, call if:    · You passed out (lost consciousness). Call your doctor now or seek immediate medical care if:    · You have symptoms such as:  ? Shortness of breath. ? Feeling very sick. ? Severe pain. ? A fast heart rate. ? Cool, pale, or clammy skin. ? Feeling confused. ?  Feeling very sleepy, or you are hard to wake up.     · You are dizzy or lightheaded, or you feel like you may faint.     · You have a fever or chills. Watch closely for changes in your health, and be sure to contact your doctor if:    · You do not get better as expected. Where can you learn more? Go to http://www.gray.com/  Enter T383 in the search box to learn more about \"Sepsis: Care Instructions. \"  Current as of: July 1, 2021               Content Version: 13.0  © 2006-2021 [employer]. Care instructions adapted under license by [employer] (which disclaims liability or warranty for this information). If you have questions about a medical condition or this instruction, always ask your healthcare professional. [place] [address] any warranty or liability for your use of this information. Patient Education        Urinary Tract Infection (UTI) in Women: Care Instructions  Overview     A urinary tract infection, or UTI, is a general term for an infection anywhere between the kidneys and the urethra (where urine comes out). Most UTIs are bladder infections. They often cause pain or burning when you urinate. UTIs are caused by bacteria and can be cured with antibiotics. Be sure to complete your treatment so that the infection does not get worse. Follow-up care is a key part of your treatment and safety. Be sure to make and go to all appointments, and call your doctor if you are having problems. It's also a good idea to know your test results and keep a list of the medicines you take. How can you care for yourself at home? · Take your antibiotics as directed. Do not stop taking them just because you feel better. You need to take the full course of antibiotics. · Drink extra water and other fluids for the next day or two. This will help make the urine less concentrated and help wash out the bacteria that are causing the infection.  (If you have kidney, heart, or liver disease and have to limit fluids, talk with your doctor before you increase the amount of fluids you drink.)  · Avoid drinks that are carbonated or have caffeine. They can irritate the bladder. · Urinate often. Try to empty your bladder each time. · To relieve pain, take a hot bath or lay a heating pad set on low over your lower belly or genital area. Never go to sleep with a heating pad in place. To prevent UTIs  · Drink plenty of water each day. This helps you urinate often, which clears bacteria from your system. (If you have kidney, heart, or liver disease and have to limit fluids, talk with your doctor before you increase the amount of fluids you drink.)  · Urinate when you need to. · If you are sexually active, urinate right after you have sex. · Change sanitary pads often. · Avoid douches, bubble baths, feminine hygiene sprays, and other feminine hygiene products that have deodorants. · After going to the bathroom, wipe from front to back. When should you call for help? Call your doctor now or seek immediate medical care if:    · Symptoms such as fever, chills, nausea, or vomiting get worse or appear for the first time.     · You have new pain in your back just below your rib cage. This is called flank pain.     · There is new blood or pus in your urine.     · You have any problems with your antibiotic medicine. Watch closely for changes in your health, and be sure to contact your doctor if:    · You are not getting better after taking an antibiotic for 2 days.     · Your symptoms go away but then come back. Where can you learn more? Go to http://www.gray.com/  Enter L200 in the search box to learn more about \"Urinary Tract Infection (UTI) in Women: Care Instructions. \"  Current as of: February 10, 2021               Content Version: 13.0  © 5127-6129 [employer]. Care instructions adapted under license by [employer] (which disclaims liability or warranty for this information).  If you have questions about a medical condition or this instruction, always ask your healthcare professional. [place] [address] any warranty or liability for your use of this information. Discharge Instructions       PATIENT ID: Tomma Bloch  MRN: [id number]   YOB: 1950    DATE OF ADMISSION: 10/14/2021  2:12 PM    DATE OF DISCHARGE: 10/21/2021    PRIMARY CARE PROVIDER: Debo Silverman MD     ATTENDING PHYSICIAN: Bin Tracey MD  DISCHARGING PROVIDER: Nate Hoffman MD    To contact this individual call [phone] and ask the  to page. If unavailable ask to be transferred the Adult Hospitalist Department. DISCHARGE DIAGNOSES sepsis/hyponatremia    CONSULTATIONS: IP CONSULT TO INFECTIOUS DISEASES  IP CONSULT TO NEPHROLOGY  IP CONSULT TO ORTHOPEDIC SURGERY    PROCEDURES/SURGERIES: * No surgery found *    PENDING TEST RESULTS:   At the time of discharge the following test results are still pending: Monitor sodium level daily  FOLLOW UP APPOINTMENTS:   Follow-up Information     Follow up With Specialties Details Why Contact Info    Debo Silverman MD Family Medicine In 1 week  [address] [place] [id number]             ADDITIONAL CARE RECOMMENDATIONS: Continue IV antibiotics as per ID/monitor sodium level    DIET: Diabetic Diet      ACTIVITY: Activity as tolerated    Wound care: Wound Care Order: submitted to [facility] Please view [email or web address]    EQUIPMENT needed: None      DISCHARGE MEDICATIONS:   See Medication Reconciliation Form    · It is important that you take the medication exactly as they are prescribed. · Keep your medication in the bottles provided by the pharmacist and keep a list of the medication names, dosages, and times to be taken in your wallet. · Do not take other medications without consulting your doctor.        NOTIFY YOUR PHYSICIAN FOR ANY OF THE FOLLOWING:   Fever over 101 degrees for 24 hours.   Chest pain, shortness of breath, fever, chills, nausea, vomiting, diarrhea, change in mentation, falling, weakness, bleeding. Severe pain or pain not relieved by medications. Or, any other signs or symptoms that you may have questions about.       DISPOSITION:    Home With:   OT  PT  HH  RN       SNF/Inpatient Rehab/LTAC    Independent/assisted living    Hospice    Other:         PROBLEM LIST Updated:  Yes  yes       Signed:   Madelin Maki MD  10/21/2021  11:25 AM

## 2021-10-21 NOTE — PROGRESS NOTES
Bedside and Verbal shift change report given to Aniket Cruz RN (oncoming nurse) by Jethro Bryant RN (offgoing nurse). Report included the following information SBAR and MAR.

## 2021-10-21 NOTE — DISCHARGE SUMMARY
Hospitalist Progress Note    Subjective:   Daily Progress Note: 10/21/2021 10:22 AM    Patient is C 88 y.o. year old female history of CAD status post CABG recent spinal surgery at T11 present with altered mental status and pain patient with history of baseline confusion but patient was more confused for last 3 days at the rehab facility patient have a T11 vertebral surgery at 505 complicated by an infection status post washout on 9 7 came to emergency room seen by the ER physician patient WBC count was elevated and patient was recommend to be admitted with altered mental status possible infection     10/20  Patient seen today laying in bed. More alert today. Hyponatremia 124. She admits to contining R leg pain. She describes the pain as a burning sensation that travels down her leg. She is not sure when it began. She admitted to past hx of low back pain. She denies nausea, vomiting, abdominal pain.     Current Facility-Administered Medications   Medication Dose Route Frequency    hydrALAZINE (APRESOLINE) tablet 100 mg  100 mg Oral TID    lisinopriL (PRINIVIL, ZESTRIL) tablet 40 mg  40 mg Oral DAILY    HYDROcodone-acetaminophen (NORCO) 5-325 mg per tablet 1 Tablet  1 Tablet Oral Q6H PRN    HYDROmorphone (DILAUDID) tablet 2 mg  2 mg Oral Q4H PRN    levothyroxine (SYNTHROID) tablet 175 mcg  175 mcg Oral ACB    [START ON 10/22/2021] Vancomycin Lab reminder 10/22 at 1600   Other ONCE    traMADoL (ULTRAM) tablet 50 mg  50 mg Oral Q6H PRN    sodium chloride tablet 1 g  1 g Oral TID WITH MEALS    vancomycin (VANCOCIN) 1,000 mg in 0.9% sodium chloride 250 mL (VIAL-MATE)  1,000 mg IntraVENous Q8H    carvediloL (COREG) tablet 25 mg  25 mg Oral BID WITH MEALS    meropenem (MERREM) 1 g in sterile water (preservative free) 20 mL IV syringe  1 g IntraVENous Q8H    albuterol (PROVENTIL HFA, VENTOLIN HFA, PROAIR HFA) inhaler 2 Puff  2 Puff Inhalation Q6H PRN    cevimeline (EVOXAC) capsule 30 mg  30 mg Oral TID  donepeziL (ARICEPT) tablet 10 mg  10 mg Oral QHS    DULoxetine (CYMBALTA) capsule 60 mg  60 mg Oral DAILY    escitalopram oxalate (LEXAPRO) tablet 20 mg  20 mg Oral DAILY    memantine (NAMENDA) tablet 10 mg  10 mg Oral BID    predniSONE (DELTASONE) tablet 5 mg  5 mg Oral DAILY WITH BREAKFAST    rifAMPin (RIFADIN) capsule 300 mg  300 mg Oral ACB&D    acetaminophen (TYLENOL) tablet 650 mg  650 mg Oral Q6H PRN    Or    acetaminophen (TYLENOL) suppository 650 mg  650 mg Rectal Q6H PRN    polyethylene glycol (MIRALAX) packet 17 g  17 g Oral DAILY PRN    ondansetron (ZOFRAN ODT) tablet 4 mg  4 mg Oral Q8H PRN    Or    ondansetron (ZOFRAN) injection 4 mg  4 mg IntraVENous Q6H PRN    heparin (porcine) injection 5,000 Units  5,000 Units SubCUTAneous Q8H    Vancomycin Pharmacy Dosing   Other Rx Dosing/Monitoring    hydrOXYzine (VISTARIL) injection 25 mg  25 mg IntraMUSCular Q6H PRN            Objective:     Visit Vitals  BP (!) 140/63   Pulse 70   Temp 97.7 °F (36.5 °C)   Resp 20   Ht 5' 8\" (1.727 m)   Wt 110 kg (242 lb 8.1 oz)   SpO2 96%   Breastfeeding No   BMI 36.87 kg/m²      O2 Device: None (Room air)    Temp (24hrs), Av.3 °F (36.8 °C), Min:97.7 °F (36.5 °C), Max:98.9 °F (37.2 °C)      No intake/output data recorded. 10/19 1901 - 10/21 0700  In: -   Out: 2850 [Urine:2850]    Physical Exam:  Constitutional: Alert and awake  HENT:  Head: Normocephalic and atraumatic. Eyes: Pupils are equal, round, and reactive to light. EOM are normal.   Cardiovascular: Normal rate, regular rhythm and normal heart sounds. Pulmonary/Chest: Breath sounds normal. No wheezes. No rales. Exhibits no tenderness. Abdominal: Soft. Bowel sounds are normal. There is no abdominal tenderness. There is no rebound and no guarding.    Musculoskeletal: R   Neurological: apporiate mood and judgement   Skin:     Data Review    24 hrs labs reviewed.      -Na 124 low, trending upward  -CRP 9.62, high  -TSH 42.80 high  -Uric Acid 2.2 low    Recent Results (from the past 24 hour(s))   URIC ACID    Collection Time: 10/20/21 12:00 PM   Result Value Ref Range    Uric acid 2.2 (L) 2.6 - 6.0 mg/dL   CBC WITH AUTOMATED DIFF    Collection Time: 10/21/21  6:48 AM   Result Value Ref Range    WBC 9.1 3.6 - 11.0 K/uL    RBC 2.98 (L) 3.80 - 5.20 M/uL    HGB 8.1 (L) 11.5 - 16.0 g/dL    HCT 25.2 (L) 35.0 - 47.0 %    MCV 84.6 80.0 - 99.0 FL    MCH 27.2 26.0 - 34.0 PG    MCHC 32.1 30.0 - 36.5 g/dL    RDW 15.5 (H) 11.5 - 14.5 %    PLATELET 173 (H) 283 - 400 K/uL    MPV 9.8 8.9 - 12.9 FL    NRBC 0.0 0.0  WBC    ABSOLUTE NRBC 0.00 0.00 - 0.01 K/uL    NEUTROPHILS 62 32 - 75 %    LYMPHOCYTES 22 12 - 49 %    MONOCYTES 9 5 - 13 %    EOSINOPHILS 3 0 - 7 %    BASOPHILS 1 0 - 1 %    IMMATURE GRANULOCYTES 3 (H) 0 - 0.5 %    ABS. NEUTROPHILS 5.6 1.8 - 8.0 K/UL    ABS. LYMPHOCYTES 2.0 0.8 - 3.5 K/UL    ABS. MONOCYTES 0.8 0.0 - 1.0 K/UL    ABS. EOSINOPHILS 0.3 0.0 - 0.4 K/UL    ABS. BASOPHILS 0.1 0.0 - 0.1 K/UL    ABS. IMM. GRANS. 0.3 (H) 0.00 - 0.04 K/UL    DF AUTOMATED     METABOLIC PANEL, COMPREHENSIVE    Collection Time: 10/21/21  6:48 AM   Result Value Ref Range    Sodium 124 (L) 136 - 145 mmol/L    Potassium 3.8 3.5 - 5.1 mmol/L    Chloride 90 (L) 97 - 108 mmol/L    CO2 25 21 - 32 mmol/L    Anion gap 9 5 - 15 mmol/L    Glucose 73 65 - 100 mg/dL    BUN 3 (L) 6 - 20 mg/dL    Creatinine 0.28 (L) 0.55 - 1.02 mg/dL    BUN/Creatinine ratio 11 (L) 12 - 20      GFR est AA >60 >60 ml/min/1.73m2    GFR est non-AA >60 >60 ml/min/1.73m2    Calcium 8.2 (L) 8.5 - 10.1 mg/dL    Bilirubin, total 0.2 0.2 - 1.0 mg/dL    AST (SGOT) 13 (L) 15 - 37 U/L    ALT (SGPT) 11 (L) 12 - 78 U/L    Alk.  phosphatase 100 45 - 117 U/L    Protein, total 6.1 (L) 6.4 - 8.2 g/dL    Albumin 1.8 (L) 3.5 - 5.0 g/dL    Globulin 4.3 (H) 2.0 - 4.0 g/dL    A-G Ratio 0.4 (L) 1.1 - 2.2     C REACTIVE PROTEIN, QT    Collection Time: 10/21/21  6:48 AM   Result Value Ref Range    C-Reactive protein 9.62 (H) 0.00 - 0.60 mg/dL   SED RATE (ESR)    Collection Time: 10/21/21  6:48 AM   Result Value Ref Range    Sed rate, automated 109 mm/hr         Assessment/Plan:     Altered mental status  Metabolic encephalopathy  Leukocytosis-Resolved  UTI  History of CAD status post CABG  Recent spinal surgery at F19 complicated by infection status post washout  Hypertension  Dementia  Depression  Hypothyroidism-TSH 42.8  Hyponatremia-Improving  GERD  Sepsis with UTI E. coli Klebsiella pneumonia  Thoracolumbar r spinal surgery with MRSA postoperative infection on vancomycin and rifampin      PLAN         Per ID:  -Continue IV Meropenem 1 gm q8 for 3 more days for UTI (unable to switch to Levaquin because drug interaction with Aricept with QT prolongation)  -Repeat urinalysis  -Follow-up with her ID provider, Dr. Carina Darden [phone], [facility] for her spine infection (Appt scheduled for Oct 26 at 2:20 pm)   -Follow-up with Dr. Ingrid Leonard at discharge (he is aware of hospitalization)  -Cleared for discharge to [facility] from ID standpoint

## 2021-10-21 NOTE — PROGRESS NOTES
56 Nursed faxed \"summary of care\" document to extension [phone].    [phone] Nurse received a follow-up phone call from [address] [place] [address] (LPN from [facility]); nurse received a different fax number due to malfunction of previous fax machine. Nurse faxed \"summary of care\" document to extension [phone].

## 2021-10-21 NOTE — PROGRESS NOTES
*ATTENTION:  This note has been created by a medical student for educational purposes only. Please do not refer to the content of this note for clinical decision-making, billing, or other purposes. Please see attending physicians note to obtain clinical information on this patient. *      Hospitalist Progress Note    Subjective:   Daily Progress Note: 10/21/2021 10:22 AM    Patient is A 89 y.o. year old female history of CAD status post CABG recent spinal surgery at T11 present with altered mental status and pain patient with history of baseline confusion but patient was more confused for last 3 days at the rehab facility patient have a T11 vertebral surgery at 544 complicated by an infection status post washout on 9 7 came to emergency room seen by the ER physician patient WBC count was elevated and patient was recommend to be admitted with altered mental status possible infection     10/20  Patient seen today laying in bed. More alert today. Hyponatremia 124. She admits to contining R leg pain. She describes the pain as a burning sensation that travels down her leg. She is not sure when it began. She admitted to past hx of low back pain. She denies nausea, vomiting, abdominal pain.     Current Facility-Administered Medications   Medication Dose Route Frequency    hydrALAZINE (APRESOLINE) tablet 100 mg  100 mg Oral TID    lisinopriL (PRINIVIL, ZESTRIL) tablet 40 mg  40 mg Oral DAILY    HYDROcodone-acetaminophen (NORCO) 5-325 mg per tablet 1 Tablet  1 Tablet Oral Q6H PRN    HYDROmorphone (DILAUDID) tablet 2 mg  2 mg Oral Q4H PRN    levothyroxine (SYNTHROID) tablet 175 mcg  175 mcg Oral ACB    [START ON 10/22/2021] Vancomycin Lab reminder 10/22 at 1600   Other ONCE    traMADoL (ULTRAM) tablet 50 mg  50 mg Oral Q6H PRN    sodium chloride tablet 1 g  1 g Oral TID WITH MEALS    vancomycin (VANCOCIN) 1,000 mg in 0.9% sodium chloride 250 mL (VIAL-MATE)  1,000 mg IntraVENous Q8H    carvediloL (COREG) tablet 25 mg  25 mg Oral BID WITH MEALS    meropenem (MERREM) 1 g in sterile water (preservative free) 20 mL IV syringe  1 g IntraVENous Q8H    albuterol (PROVENTIL HFA, VENTOLIN HFA, PROAIR HFA) inhaler 2 Puff  2 Puff Inhalation Q6H PRN    cevimeline (EVOXAC) capsule 30 mg  30 mg Oral TID    donepeziL (ARICEPT) tablet 10 mg  10 mg Oral QHS    DULoxetine (CYMBALTA) capsule 60 mg  60 mg Oral DAILY    escitalopram oxalate (LEXAPRO) tablet 20 mg  20 mg Oral DAILY    memantine (NAMENDA) tablet 10 mg  10 mg Oral BID    predniSONE (DELTASONE) tablet 5 mg  5 mg Oral DAILY WITH BREAKFAST    rifAMPin (RIFADIN) capsule 300 mg  300 mg Oral ACB&D    acetaminophen (TYLENOL) tablet 650 mg  650 mg Oral Q6H PRN    Or    acetaminophen (TYLENOL) suppository 650 mg  650 mg Rectal Q6H PRN    polyethylene glycol (MIRALAX) packet 17 g  17 g Oral DAILY PRN    ondansetron (ZOFRAN ODT) tablet 4 mg  4 mg Oral Q8H PRN    Or    ondansetron (ZOFRAN) injection 4 mg  4 mg IntraVENous Q6H PRN    heparin (porcine) injection 5,000 Units  5,000 Units SubCUTAneous Q8H    Vancomycin Pharmacy Dosing   Other Rx Dosing/Monitoring    hydrOXYzine (VISTARIL) injection 25 mg  25 mg IntraMUSCular Q6H PRN            Objective:     Visit Vitals  BP (!) 140/63   Pulse 70   Temp 97.7 °F (36.5 °C)   Resp 20   Ht 5' 8\" (1.727 m)   Wt 242 lb 8.1 oz (110 kg)   SpO2 96%   Breastfeeding No   BMI 36.87 kg/m²      O2 Device: None (Room air)    Temp (24hrs), Av.3 °F (36.8 °C), Min:97.7 °F (36.5 °C), Max:98.9 °F (37.2 °C)      No intake/output data recorded. 10/19 1901 - 10/21 0700  In: -   Out: 2850 [Urine:2850]    Physical Exam:  Constitutional: Alert and awake  HENT:  Head: Normocephalic and atraumatic. Eyes: Pupils are equal, round, and reactive to light. EOM are normal.   Cardiovascular: Normal rate, regular rhythm and normal heart sounds. Pulmonary/Chest: Breath sounds normal. No wheezes. No rales. Exhibits no tenderness. Abdominal: Soft.  Bowel sounds are normal. There is no abdominal tenderness. There is no rebound and no guarding.    Musculoskeletal: R   Neurological: apporiate mood and judgement   Skin:     Data Review    24 hrs labs reviewed. -Na 124 low, trending upward  -CRP 9.62, high  -TSH 42.80 high  -Uric Acid 2.2 low    Recent Results (from the past 24 hour(s))   VANCOMYCIN, TROUGH    Collection Time: 10/20/21 11:06 AM   Result Value Ref Range    Vancomycin,trough 23.8 (HH) 5.0 - 10.0 ug/mL    Reported dose date Blood      Reported dose: Blood Units   URIC ACID    Collection Time: 10/20/21 12:00 PM   Result Value Ref Range    Uric acid 2.2 (L) 2.6 - 6.0 mg/dL   CBC WITH AUTOMATED DIFF    Collection Time: 10/21/21  6:48 AM   Result Value Ref Range    WBC 9.1 3.6 - 11.0 K/uL    RBC 2.98 (L) 3.80 - 5.20 M/uL    HGB 8.1 (L) 11.5 - 16.0 g/dL    HCT 25.2 (L) 35.0 - 47.0 %    MCV 84.6 80.0 - 99.0 FL    MCH 27.2 26.0 - 34.0 PG    MCHC 32.1 30.0 - 36.5 g/dL    RDW 15.5 (H) 11.5 - 14.5 %    PLATELET 774 (H) 189 - 400 K/uL    MPV 9.8 8.9 - 12.9 FL    NRBC 0.0 0.0  WBC    ABSOLUTE NRBC 0.00 0.00 - 0.01 K/uL    NEUTROPHILS 62 32 - 75 %    LYMPHOCYTES 22 12 - 49 %    MONOCYTES 9 5 - 13 %    EOSINOPHILS 3 0 - 7 %    BASOPHILS 1 0 - 1 %    IMMATURE GRANULOCYTES 3 (H) 0 - 0.5 %    ABS. NEUTROPHILS 5.6 1.8 - 8.0 K/UL    ABS. LYMPHOCYTES 2.0 0.8 - 3.5 K/UL    ABS. MONOCYTES 0.8 0.0 - 1.0 K/UL    ABS. EOSINOPHILS 0.3 0.0 - 0.4 K/UL    ABS. BASOPHILS 0.1 0.0 - 0.1 K/UL    ABS. IMM.  GRANS. 0.3 (H) 0.00 - 0.04 K/UL    DF AUTOMATED     METABOLIC PANEL, COMPREHENSIVE    Collection Time: 10/21/21  6:48 AM   Result Value Ref Range    Sodium 124 (L) 136 - 145 mmol/L    Potassium 3.8 3.5 - 5.1 mmol/L    Chloride 90 (L) 97 - 108 mmol/L    CO2 25 21 - 32 mmol/L    Anion gap 9 5 - 15 mmol/L    Glucose 73 65 - 100 mg/dL    BUN 3 (L) 6 - 20 mg/dL    Creatinine 0.28 (L) 0.55 - 1.02 mg/dL    BUN/Creatinine ratio 11 (L) 12 - 20      GFR est AA >60 >60 ml/min/1.73m2 GFR est non-AA >60 >60 ml/min/1.73m2    Calcium 8.2 (L) 8.5 - 10.1 mg/dL    Bilirubin, total 0.2 0.2 - 1.0 mg/dL    AST (SGOT) 13 (L) 15 - 37 U/L    ALT (SGPT) 11 (L) 12 - 78 U/L    Alk.  phosphatase 100 45 - 117 U/L    Protein, total 6.1 (L) 6.4 - 8.2 g/dL    Albumin 1.8 (L) 3.5 - 5.0 g/dL    Globulin 4.3 (H) 2.0 - 4.0 g/dL    A-G Ratio 0.4 (L) 1.1 - 2.2     C REACTIVE PROTEIN, QT    Collection Time: 10/21/21  6:48 AM   Result Value Ref Range    C-Reactive protein 9.62 (H) 0.00 - 0.60 mg/dL   SED RATE (ESR)    Collection Time: 10/21/21  6:48 AM   Result Value Ref Range    Sed rate, automated 109 mm/hr         Assessment/Plan:     Altered mental status  Metabolic encephalopathy  Leukocytosis-Resolved  UTI  History of CAD status post CABG  Recent spinal surgery at Z85 complicated by infection status post washout  Hypertension  Dementia  Depression  Hypothyroidism-TSH 42.8  Hyponatremia-Improving  GERD  Sepsis with UTI E. coli Klebsiella pneumonia  Thoracolumbar r spinal surgery with MRSA postoperative infection on vancomycin and rifampin      PLAN     Start on sodium chloride tablet  Repeat labs     Monitor sodium level if stable then discharge home  Continue current medication on IV vancomycin and IV meropenem    Per ID:  -Continue IV Meropenem 1 gm q8 for 3 more days for UTI (unable to switch to Levaquin because drug interaction with Aricept with QT prolongation)  -Repeat urinalysis  -Follow-up with her ID provider, Dr. Carina Darden [phone], [facility] for her spine infection (Appt scheduled for Oct 26 at 2:20 pm)   -Follow-up with Dr. Justo Nassar at discharge (he is aware of hospitalization)  -Cleared for discharge to [facility] from ID standpoint

## 2021-10-21 NOTE — PROGRESS NOTES
Bedside and Verbal shift change report given to Dianah Sandhoff, RN (oncoming nurse) by Ghazala Lockhart RN (offgoing nurse). Report included the following information SBAR and MAR.

## 2021-10-23 ENCOUNTER — APPOINTMENT (OUTPATIENT)
Dept: GENERAL RADIOLOGY | Age: 71
DRG: 917 | End: 2021-10-23
Attending: EMERGENCY MEDICINE
Payer: MEDICARE

## 2021-10-23 ENCOUNTER — HOSPITAL ENCOUNTER (INPATIENT)
Age: 71
LOS: 4 days | Discharge: NURSING, REHAB OR LONG-TERM CARE | DRG: 917 | End: 2021-10-27
Attending: EMERGENCY MEDICINE | Admitting: HOSPITALIST
Payer: MEDICARE

## 2021-10-23 ENCOUNTER — APPOINTMENT (OUTPATIENT)
Dept: CT IMAGING | Age: 71
DRG: 917 | End: 2021-10-23
Attending: STUDENT IN AN ORGANIZED HEALTH CARE EDUCATION/TRAINING PROGRAM
Payer: MEDICARE

## 2021-10-23 DIAGNOSIS — E87.1 HYPONATREMIA: ICD-10-CM

## 2021-10-23 DIAGNOSIS — R41.82 ALTERED MENTAL STATUS, UNSPECIFIED ALTERED MENTAL STATUS TYPE: Primary | ICD-10-CM

## 2021-10-23 PROBLEM — G93.40 ACUTE ENCEPHALOPATHY: Status: ACTIVE | Noted: 2021-10-23

## 2021-10-23 LAB
ALBUMIN SERPL-MCNC: 1.8 G/DL (ref 3.5–5)
ALBUMIN/GLOB SERPL: 0.4 {RATIO} (ref 1.1–2.2)
ALP SERPL-CCNC: 107 U/L (ref 45–117)
ALT SERPL-CCNC: 15 U/L (ref 12–78)
AMPHET UR QL SCN: NEGATIVE
ANION GAP SERPL CALC-SCNC: 8 MMOL/L (ref 5–15)
APPEARANCE UR: ABNORMAL
AST SERPL W P-5'-P-CCNC: 26 U/L (ref 15–37)
ATRIAL RATE: 70 BPM
BACTERIA URNS QL MICRO: NEGATIVE /HPF
BARBITURATES UR QL SCN: NEGATIVE
BASOPHILS # BLD: 0.1 K/UL (ref 0–0.1)
BASOPHILS NFR BLD: 1 % (ref 0–1)
BENZODIAZ UR QL: NEGATIVE
BILIRUB SERPL-MCNC: 0.4 MG/DL (ref 0.2–1)
BILIRUB UR QL: NEGATIVE
BNP SERPL-MCNC: 3099 PG/ML
BUN SERPL-MCNC: 5 MG/DL (ref 6–20)
BUN/CREAT SERPL: 8 (ref 12–20)
CA-I BLD-MCNC: 8.4 MG/DL (ref 8.5–10.1)
CALCULATED P AXIS, ECG09: 41 DEGREES
CALCULATED R AXIS, ECG10: 15 DEGREES
CALCULATED T AXIS, ECG11: 18 DEGREES
CANNABINOIDS UR QL SCN: NEGATIVE
CHLORIDE SERPL-SCNC: 90 MMOL/L (ref 97–108)
CO2 SERPL-SCNC: 29 MMOL/L (ref 21–32)
COCAINE UR QL SCN: NEGATIVE
COLOR UR: ABNORMAL
COVID-19 RAPID TEST, COVR: NOT DETECTED
CREAT SERPL-MCNC: 0.61 MG/DL (ref 0.55–1.02)
DIAGNOSIS, 93000: NORMAL
DIFFERENTIAL METHOD BLD: ABNORMAL
DRUG SCRN COMMENT,DRGCM: ABNORMAL
EOSINOPHIL # BLD: 0.2 K/UL (ref 0–0.4)
EOSINOPHIL NFR BLD: 2 % (ref 0–7)
ERYTHROCYTE [DISTWIDTH] IN BLOOD BY AUTOMATED COUNT: 15.8 % (ref 11.5–14.5)
GLOBULIN SER CALC-MCNC: 4.1 G/DL (ref 2–4)
GLUCOSE BLD STRIP.AUTO-MCNC: 124 MG/DL (ref 65–117)
GLUCOSE SERPL-MCNC: 106 MG/DL (ref 65–100)
GLUCOSE UR STRIP.AUTO-MCNC: NEGATIVE MG/DL
HCT VFR BLD AUTO: 25.4 % (ref 35–47)
HGB BLD-MCNC: 8.2 G/DL (ref 11.5–16)
HGB UR QL STRIP: NEGATIVE
HYALINE CASTS URNS QL MICRO: ABNORMAL /LPF (ref 0–5)
IMM GRANULOCYTES # BLD AUTO: 0.2 K/UL (ref 0–0.04)
IMM GRANULOCYTES NFR BLD AUTO: 1 % (ref 0–0.5)
KETONES UR QL STRIP.AUTO: 20 MG/DL
LACTATE SERPL-SCNC: 1 MMOL/L (ref 0.4–2)
LEUKOCYTE ESTERASE UR QL STRIP.AUTO: NEGATIVE
LYMPHOCYTES # BLD: 1.6 K/UL (ref 0.8–3.5)
LYMPHOCYTES NFR BLD: 12 % (ref 12–49)
MCH RBC QN AUTO: 27.2 PG (ref 26–34)
MCHC RBC AUTO-ENTMCNC: 32.3 G/DL (ref 30–36.5)
MCV RBC AUTO: 84.4 FL (ref 80–99)
METHADONE UR QL: NEGATIVE
MONOCYTES # BLD: 0.8 K/UL (ref 0–1)
MONOCYTES NFR BLD: 6 % (ref 5–13)
MUCOUS THREADS URNS QL MICRO: ABNORMAL /LPF
NEUTS SEG # BLD: 10.3 K/UL (ref 1.8–8)
NEUTS SEG NFR BLD: 78 % (ref 32–75)
NITRITE UR QL STRIP.AUTO: NEGATIVE
NRBC # BLD: 0 K/UL (ref 0–0.01)
NRBC BLD-RTO: 0 PER 100 WBC
OPIATES UR QL: POSITIVE
P-R INTERVAL, ECG05: 186 MS
PCP UR QL: NEGATIVE
PERFORMED BY, TECHID: ABNORMAL
PH UR STRIP: 5 [PH] (ref 5–8)
PLATELET # BLD AUTO: 745 K/UL (ref 150–400)
PMV BLD AUTO: 9.2 FL (ref 8.9–12.9)
POTASSIUM SERPL-SCNC: 3.9 MMOL/L (ref 3.5–5.1)
PROT SERPL-MCNC: 5.9 G/DL (ref 6.4–8.2)
PROT UR STRIP-MCNC: NEGATIVE MG/DL
Q-T INTERVAL, ECG07: 498 MS
QRS DURATION, ECG06: 108 MS
QTC CALCULATION (BEZET), ECG08: 537 MS
RBC # BLD AUTO: 3.01 M/UL (ref 3.8–5.2)
RBC #/AREA URNS HPF: ABNORMAL /HPF (ref 0–5)
SODIUM SERPL-SCNC: 127 MMOL/L (ref 136–145)
SP GR UR REFRACTOMETRY: 1.01 (ref 1–1.03)
SPECIMEN SOURCE: NORMAL
TROPONIN-HIGH SENSITIVITY: 19 NG/L (ref 0–51)
TROPONIN-HIGH SENSITIVITY: 22 NG/L (ref 0–51)
UROBILINOGEN UR QL STRIP.AUTO: 0.1 EU/DL (ref 0.1–1)
VENTRICULAR RATE, ECG03: 70 BPM
WBC # BLD AUTO: 13.2 K/UL (ref 3.6–11)
WBC URNS QL MICRO: ABNORMAL /HPF (ref 0–4)

## 2021-10-23 PROCEDURE — 93005 ELECTROCARDIOGRAM TRACING: CPT

## 2021-10-23 PROCEDURE — 74011250637 HC RX REV CODE- 250/637: Performed by: HOSPITALIST

## 2021-10-23 PROCEDURE — 74011250636 HC RX REV CODE- 250/636: Performed by: STUDENT IN AN ORGANIZED HEALTH CARE EDUCATION/TRAINING PROGRAM

## 2021-10-23 PROCEDURE — 87635 SARS-COV-2 COVID-19 AMP PRB: CPT

## 2021-10-23 PROCEDURE — 65270000029 HC RM PRIVATE

## 2021-10-23 PROCEDURE — 36415 COLL VENOUS BLD VENIPUNCTURE: CPT

## 2021-10-23 PROCEDURE — 83880 ASSAY OF NATRIURETIC PEPTIDE: CPT

## 2021-10-23 PROCEDURE — 84484 ASSAY OF TROPONIN QUANT: CPT

## 2021-10-23 PROCEDURE — 80053 COMPREHEN METABOLIC PANEL: CPT

## 2021-10-23 PROCEDURE — 96374 THER/PROPH/DIAG INJ IV PUSH: CPT

## 2021-10-23 PROCEDURE — 99285 EMERGENCY DEPT VISIT HI MDM: CPT

## 2021-10-23 PROCEDURE — 81001 URINALYSIS AUTO W/SCOPE: CPT

## 2021-10-23 PROCEDURE — 74011000258 HC RX REV CODE- 258: Performed by: HOSPITALIST

## 2021-10-23 PROCEDURE — 71045 X-RAY EXAM CHEST 1 VIEW: CPT

## 2021-10-23 PROCEDURE — 83605 ASSAY OF LACTIC ACID: CPT

## 2021-10-23 PROCEDURE — 85025 COMPLETE CBC W/AUTO DIFF WBC: CPT

## 2021-10-23 PROCEDURE — 74011250636 HC RX REV CODE- 250/636: Performed by: EMERGENCY MEDICINE

## 2021-10-23 PROCEDURE — 87040 BLOOD CULTURE FOR BACTERIA: CPT

## 2021-10-23 PROCEDURE — 84443 ASSAY THYROID STIM HORMONE: CPT

## 2021-10-23 PROCEDURE — 74011250636 HC RX REV CODE- 250/636: Performed by: HOSPITALIST

## 2021-10-23 PROCEDURE — 82962 GLUCOSE BLOOD TEST: CPT

## 2021-10-23 PROCEDURE — 80307 DRUG TEST PRSMV CHEM ANLYZR: CPT

## 2021-10-23 PROCEDURE — 70450 CT HEAD/BRAIN W/O DYE: CPT

## 2021-10-23 PROCEDURE — 96376 TX/PRO/DX INJ SAME DRUG ADON: CPT

## 2021-10-23 PROCEDURE — 96361 HYDRATE IV INFUSION ADD-ON: CPT

## 2021-10-23 RX ORDER — NALOXONE HYDROCHLORIDE 1 MG/ML
2 INJECTION INTRAMUSCULAR; INTRAVENOUS; SUBCUTANEOUS
Status: COMPLETED | OUTPATIENT
Start: 2021-10-23 | End: 2021-10-23

## 2021-10-23 RX ORDER — ACETAMINOPHEN 325 MG/1
650 TABLET ORAL
Status: DISCONTINUED | OUTPATIENT
Start: 2021-10-23 | End: 2021-10-27 | Stop reason: HOSPADM

## 2021-10-23 RX ORDER — SODIUM CHLORIDE 0.9 % (FLUSH) 0.9 %
5-40 SYRINGE (ML) INJECTION AS NEEDED
Status: DISCONTINUED | OUTPATIENT
Start: 2021-10-23 | End: 2021-10-27 | Stop reason: HOSPADM

## 2021-10-23 RX ORDER — ENOXAPARIN SODIUM 100 MG/ML
40 INJECTION SUBCUTANEOUS DAILY
Status: DISCONTINUED | OUTPATIENT
Start: 2021-10-24 | End: 2021-10-27 | Stop reason: HOSPADM

## 2021-10-23 RX ORDER — ACETAMINOPHEN 650 MG/1
650 SUPPOSITORY RECTAL
Status: DISCONTINUED | OUTPATIENT
Start: 2021-10-23 | End: 2021-10-27 | Stop reason: HOSPADM

## 2021-10-23 RX ORDER — NALOXONE HYDROCHLORIDE 1 MG/ML
2 INJECTION INTRAMUSCULAR; INTRAVENOUS; SUBCUTANEOUS ONCE
Status: COMPLETED | OUTPATIENT
Start: 2021-10-23 | End: 2021-10-23

## 2021-10-23 RX ORDER — RIFAMPIN 300 MG/1
600 CAPSULE ORAL
Status: DISCONTINUED | OUTPATIENT
Start: 2021-10-24 | End: 2021-10-27 | Stop reason: HOSPADM

## 2021-10-23 RX ORDER — PANTOPRAZOLE SODIUM 40 MG/1
40 TABLET, DELAYED RELEASE ORAL DAILY
Status: DISCONTINUED | OUTPATIENT
Start: 2021-10-24 | End: 2021-10-27 | Stop reason: HOSPADM

## 2021-10-23 RX ORDER — PREDNISONE 5 MG/1
5 TABLET ORAL DAILY
Status: DISCONTINUED | OUTPATIENT
Start: 2021-10-24 | End: 2021-10-27 | Stop reason: HOSPADM

## 2021-10-23 RX ORDER — POLYETHYLENE GLYCOL 3350 17 G/17G
17 POWDER, FOR SOLUTION ORAL DAILY PRN
Status: DISCONTINUED | OUTPATIENT
Start: 2021-10-23 | End: 2021-10-27 | Stop reason: HOSPADM

## 2021-10-23 RX ORDER — ONDANSETRON 4 MG/1
4 TABLET, ORALLY DISINTEGRATING ORAL
Status: DISCONTINUED | OUTPATIENT
Start: 2021-10-23 | End: 2021-10-27 | Stop reason: HOSPADM

## 2021-10-23 RX ORDER — CEVIMELINE HYDROCHLORIDE 30 MG/1
30 CAPSULE ORAL 3 TIMES DAILY
Status: DISCONTINUED | OUTPATIENT
Start: 2021-10-23 | End: 2021-10-27 | Stop reason: HOSPADM

## 2021-10-23 RX ORDER — ONDANSETRON 2 MG/ML
4 INJECTION INTRAMUSCULAR; INTRAVENOUS
Status: DISCONTINUED | OUTPATIENT
Start: 2021-10-23 | End: 2021-10-27 | Stop reason: HOSPADM

## 2021-10-23 RX ORDER — LEVOTHYROXINE SODIUM 75 UG/1
150 TABLET ORAL DAILY
Status: DISCONTINUED | OUTPATIENT
Start: 2021-10-24 | End: 2021-10-27 | Stop reason: HOSPADM

## 2021-10-23 RX ORDER — SODIUM CHLORIDE 0.9 % (FLUSH) 0.9 %
5-40 SYRINGE (ML) INJECTION EVERY 8 HOURS
Status: DISCONTINUED | OUTPATIENT
Start: 2021-10-23 | End: 2021-10-25

## 2021-10-23 RX ADMIN — SODIUM CHLORIDE 1000 ML: 9 INJECTION, SOLUTION INTRAVENOUS at 13:15

## 2021-10-23 RX ADMIN — NALOXONE HYDROCHLORIDE 2 MG: 1 INJECTION PARENTERAL at 13:17

## 2021-10-23 RX ADMIN — Medication 10 ML: at 21:33

## 2021-10-23 RX ADMIN — NALOXONE HYDROCHLORIDE 2 MG: 1 INJECTION PARENTERAL at 13:19

## 2021-10-23 RX ADMIN — NALOXONE HYDROCHLORIDE 2 MG: 1 INJECTION PARENTERAL at 15:55

## 2021-10-23 RX ADMIN — CEVIMELINE 30 MG: 30 CAPSULE ORAL at 21:23

## 2021-10-23 RX ADMIN — DAPTOMYCIN 650 MG: 500 INJECTION, POWDER, LYOPHILIZED, FOR SOLUTION INTRAVENOUS at 20:11

## 2021-10-23 NOTE — ROUTINE PROCESS
TRANSFER - OUT REPORT:    Verbal report given to [facility] on Andie Redd  being transferred to [facility] for routine progression of care       Report consisted of patients Situation, Background, Assessment and   Recommendations(SBAR). Information from the following report(s) SBAR was reviewed with the receiving nurse. Lines:   PICC Double Lumen 09/20/21 Right;Brachial (Active)       Peripheral IV 10/23/21 Anterior; Left Hand (Active)        Opportunity for questions and clarification was provided.       Patient transported with:   [employer]

## 2021-10-23 NOTE — ED TRIAGE NOTES
Pt came from [facility] and rehab, nurse gave 10mg oxycodone, patient then became unresponsive, gave 4mg narcan and patient became responsive but confused.  Normally alert and oriented

## 2021-10-23 NOTE — H&P
History and Physical    Subjective:   Chief Complaint : unresponsive since AM  Source of information : charts and significant other    History of present illness:     71F, h/o sjogrens syndrome, RA on prednisone, fibromylgia, depression, COPD not on oxygen and CHFpEF not on lasix    A brief into: Importantly she has a history of  Thoracic spinal stenosis, s/p laminectomy. On 9/15/2021 she was discharged from [facility] hospital- a complicated hospital stay where she presented with septic shock s/p epidural abscess s/p drainage that grew MRSA, she has a PICC line and was on daptomycin and rifampin for 6 weeks. SHE HAD INADEQUATE RESPONSE TO VANCOMYCIN She has 4 more days of abx as per the charts. And it was in that admission she was started on percocet    Not to mention she is on dilaudid 2 mg every 6 hourly, with flexeril, Zanaflex, amitriptyline, duloxetine     She present with unresponsiveness from the nursing home in the morning. She arrived via EMS and received 4mg narcan, and ER gave 6 mg naracn with improvement in mentation.  She was on 6L and weaned     Currently she is complaining of abdominal pain    She denies hitting the head, diarrhea,     ER: CT head no IC bleed    Past Medical History:   Diagnosis Date    Adverse effect of anesthesia     Arthritis     joint pain    Back pain     Balance problem     Bruises easily     Bursitis     Cataract     Chronic bronchitis (HCC)     Chronic fatigue syndrome     Chronic obstructive pulmonary disease ([address]     COVID-19 virus infection 02/2021    Edema of both legs     Esophageal reflux     Falls frequently 02/2018    6 times in the last 4 months    Fibroids     Fibromyalgia     GERD (gastroesophageal reflux disease)     Hiatal hernia     Hiatal hernia     History of peptic ulcer     Hypertension     IBS (irritable bowel syndrome)     Ill-defined condition     Irregular heart beat     Migraines     Neck pain     Obesity     Osteoporosis     Psychiatric disorder     DEPRESSION    PUD (peptic ulcer disease)     [facility]VD [facility]ipheral vascular disease) [facility]     RA (rheumatoid arthritis) [facility]     Right leg numbness     Sarcoidosis     brain    Sarcoidosis of lung ([facility] 2011    Short-term memory loss     SOB (shortness of breath)     Thyroid disease     hypothyroid    Tremors of nervous system     Urinary incontinence      Past Surgical History:   Procedure Laterality Date    HX APPENDECTOMY  1968    HX BREAST LUMPECTOMY      X 6    HX CATARACT REMOVAL Bilateral 08/2014    HX CERVICAL FUSION  03/2017    HX CHOLECYSTECTOMY  1995    HX GASTRIC BYPASS  1978    HX HYSTERECTOMY  1983    HX LUMBAR FUSION  12/2012    HX ORTHOPAEDIC  2012    L1-L5    HX TONSILLECTOMY  2012    PICC INSERTION VASCULAR ACCESS TEAM  9/20/2021          Family History   Problem Relation Age of Onset    Hypertension Mother     Thyroid Disease Mother     Hypertension Father     Other Father         angina    Hypertension Paternal Grandmother     Diabetes Paternal Grandmother     Diabetes Paternal Grandfather     Hypertension Paternal Grandfather       Social History     Tobacco Use    Smoking status: Never Smoker    Smokeless tobacco: Never Used   Substance Use Topics    Alcohol use: Yes     Alcohol/week: 7.0 standard drinks     Types: 7 Shots of liquor per week     Comment: liquor or wine       Prior to Admission medications    Medication Sig Start Date End Date Taking? Authorizing Provider   cyclobenzaprine (FLEXERIL) 10 mg tablet Take 1 Tablet by mouth three (3) times daily as needed for Muscle Spasm(s). 9/20/21   Alicia Sotomayor PA-C   HYDROmorphone (Dilaudid) 2 mg tablet Take 2 mg by mouth every six (6) hours as needed for Pain. Provider, Historical   ondansetron hcl (Zofran) 4 mg tablet Take 4 mg by mouth every six (6) hours as needed for Nausea or Vomiting.     Provider, Historical   cyclobenzaprine (FLEXERIL) 10 mg tablet Take 1 Tablet by mouth three (3) times daily as needed for Muscle Spasm(s). 8/30/21   Linda Becker PA-C   carvediloL (COREG) 6.25 mg tablet Take 6.25 mg by mouth two (2) times daily (with meals). Provider, Historical   memantine (NAMENDA) 10 mg tablet Take 10 mg by mouth two (2) times a day. Provider, Historical   donepeziL (ARICEPT) 5 mg tablet Take 5 mg by mouth nightly. Provider, Historical   predniSONE (DELTASONE) 5 mg tablet Take 5 mg by mouth daily. Provider, Historical   tiZANidine (ZANAFLEX) 4 mg capsule Take 8 mg by mouth three (3) times daily. Provider, Historical   fluticasone furoate-vilanteroL (Breo Ellipta) 100-25 mcg/dose inhaler Take 1 Puff by inhalation daily. Provider, Historical   albuterol (PROVENTIL HFA, VENTOLIN HFA, PROAIR HFA) 90 mcg/actuation inhaler Take 2 Puffs by inhalation every six (6) hours as needed for Wheezing. Provider, Historical   alendronate (FOSAMAX) 35 mg tablet Take 35 mg by mouth every seven (7) days. Provider, Historical   cyanocobalamin (VITAMIN B12) 1,000 mcg/mL injection 1,000 mcg by IntraMUSCular route every thirty (30) days. Provider, Historical   metroNIDAZOLE (METROGEL) 0.75 % topical gel Apply  to affected area two (2) times a day. Provider, Historical   gabapentin (NEURONTIN) 800 mg tablet Take 800 mg by mouth three (3) times daily. Provider, Historical   escitalopram oxalate (LEXAPRO) 20 mg tablet Take 20 mg by mouth daily. Provider, Historical   cetirizine (ZYRTEC) 10 mg tablet Take 10 mg by mouth daily. Provider, Historical   amitriptyline (ELAVIL) 25 mg tablet Take 25 mg by mouth nightly. Provider, Historical   lisinopril (PRINIVIL, ZESTRIL) 5 mg tablet Take 5 mg by mouth daily. Provider, Historical   cevimeline (EVOXAC) 30 mg capsule Take 30 mg by mouth three (3) times daily. Provider, Historical   DULoxetine (Cymbalta) 60 mg capsule Take 60 mg by mouth nightly.     Provider, Historical   levothyroxine (Synthroid) 150 mcg tablet Take 150 mcg by mouth daily. Provider, Historical   Omeprazole delayed release (PRILOSEC D/R) 20 mg tablet Take 20 mg by mouth daily. Provider, Historical     Allergies   Allergen Reactions    Other Food Hives and Unknown (comments)    Amoxicillin Rash    Erythromycin Anaphylaxis    Pcn [Penicillins] Anaphylaxis    Bactrim [Sulfamethoprim Ds] Unknown (comments)    Buckwheat Other (comments)    Cayenne Hives    Ceclor [Cefaclor] Unknown (comments) and Rash     Heavy rash    Cheese Other (comments)    Clarithromycin Unknown (comments)    Egg Runny Nose     Eyes water, runny nose    Fish Containing Products Rash     Jeanette    Gluten Other (comments)    Lactose Runny Nose     Eyes water, runny nose    Nuts [Nut - Unspecified] Hives, Runny Nose and Other (comments)     Pistachio also cause eyes to water    Oats Other (comments)    Safflower Oil Hives    Sesame Seed Other (comments)    Sulfa (Sulfonamide Antibiotics) Unknown (comments)    Sulfamethoxazole-Trimethoprim Unknown (comments) and Rash     Rash    Sulfur Hives and Itching    Tetracycline Rash     Swelling    Tetracyclines Unknown (comments)    Wheat Other (comments)             Review of Systems:  Unable to perform ROS: Mental status change     Vitals:     Visit Vitals  /88   Pulse 65   Temp 98.2 °F (36.8 °C)   Resp 20   Wt 112.2 kg (247 lb 5.7 oz)   SpO2 100%   BMI 41.16 kg/m²       Physical Exam:   Physical Exam  Constitutional:       Appearance: Normal appearance. She is ill-appearing. HENT:      Head: Normocephalic and atraumatic. Right Ear: External ear normal.      Left Ear: External ear normal.      Nose: Nose normal.      Mouth/Throat:      Mouth: Mucous membranes are moist.   Eyes:      Extraocular Movements: Extraocular movements intact. Right eye: No nystagmus. Left eye: No nystagmus. Conjunctiva/sclera: Conjunctivae normal.      Pupils:      Right eye: Pupil is not reactive. Left eye: Pupil is not reactive. Comments: Bilateral pinpoint pupils without ocular deviation or nystagmus   Cardiovascular:      Rate and Rhythm: Normal rate and regular rhythm. Pulses: Normal pulses. Heart sounds: Normal heart sounds. Pulmonary:      Effort: Pulmonary effort is normal. No respiratory distress. Breath sounds: Normal breath sounds. No stridor. No wheezing, rhonchi or rales. Abdominal:      General: Abdomen is flat. There is no distension. Palpations: Abdomen is soft. Tenderness: There is no abdominal tenderness. Comments: Midline scar   Musculoskeletal:         General: Normal range of motion. Cervical back: Normal range of motion. Skin:     General: Skin is warm and dry. Capillary Refill: Capillary refill takes less than 2 seconds. Neurological:      Mental Status: She is lethargic. GCS: GCS eye subscore is 3. GCS verbal subscore is 2. GCS motor subscore is 5.    Psychiatric:         Mood and Affect: Mood normal.         Behavior: Behavior normal.         Data Review:   Recent Results (from the past 24 hour(s))   URINALYSIS W/MICROSCOPIC    Collection Time: 10/23/21  1:00 PM   Result Value Ref Range    Color Yellow/Straw      Appearance Turbid (A) Clear      Specific gravity 1.011 1.003 - 1.030      pH (UA) 5.0 5.0 - 8.0      Protein Negative Negative mg/dL    Glucose Negative Negative mg/dL    Ketone 20 (A) Negative mg/dL    Bilirubin Negative Negative      Blood Negative Negative      Urobilinogen 0.1 0.1 - 1.0 EU/dL    Nitrites Negative Negative      Leukocyte Esterase Negative Negative      WBC 10-20 0 - 4 /hpf    RBC 0-5 0 - 5 /hpf    Bacteria Negative Negative /hpf    Mucus Trace (A) Negative /lpf    Hyaline cast 10-20 0 - 5 /lpf   DRUG SCREEN, URINE    Collection Time: 10/23/21  1:15 PM   Result Value Ref Range    AMPHETAMINES Negative Negative      BARBITURATES Negative Negative      BENZODIAZEPINES Negative Negative      COCAINE Negative Negative      METHADONE Negative Negative      OPIATES Positive (A) Negative      PCP(PHENCYCLIDINE) Negative Negative      THC (TH-CANNABINOL) Negative Negative      Drug screen comment        This test is a screen for drugs of abuse in a medical setting only (i.e., they are unconfirmed results and as such must not be used for non-medical purposes, e.g.,employment testing, legal testing). Due to its inherent nature, false positive (FP) and false negative (FN) results may be obtained. Therefore, if necessary for medical care, recommend confirmation of positive findings by GC/MS. GLUCOSE, POC    Collection Time: 10/23/21  1:20 PM   Result Value Ref Range    Glucose (POC) 124 (H) 65 - 117 mg/dL    Performed by Doron Meeks    CBC WITH AUTOMATED DIFF    Collection Time: 10/23/21  1:25 PM   Result Value Ref Range    WBC 13.2 (H) 3.6 - 11.0 K/uL    RBC 3.01 (L) 3.80 - 5.20 M/uL    HGB 8.2 (L) 11.5 - 16.0 g/dL    HCT 25.4 (L) 35.0 - 47.0 %    MCV 84.4 80.0 - 99.0 FL    MCH 27.2 26.0 - 34.0 PG    MCHC 32.3 30.0 - 36.5 g/dL    RDW 15.8 (H) 11.5 - 14.5 %    PLATELET 089 (H) 493 - 400 K/uL    MPV 9.2 8.9 - 12.9 FL    NRBC 0.0 0.0  WBC    ABSOLUTE NRBC 0.00 0.00 - 0.01 K/uL    NEUTROPHILS 78 (H) 32 - 75 %    LYMPHOCYTES 12 12 - 49 %    MONOCYTES 6 5 - 13 %    EOSINOPHILS 2 0 - 7 %    BASOPHILS 1 0 - 1 %    IMMATURE GRANULOCYTES 1 (H) 0 - 0.5 %    ABS. NEUTROPHILS 10.3 (H) 1.8 - 8.0 K/UL    ABS. LYMPHOCYTES 1.6 0.8 - 3.5 K/UL    ABS. MONOCYTES 0.8 0.0 - 1.0 K/UL    ABS. EOSINOPHILS 0.2 0.0 - 0.4 K/UL    ABS. BASOPHILS 0.1 0.0 - 0.1 K/UL    ABS. IMM.  GRANS. 0.2 (H) 0.00 - 0.04 K/UL    DF AUTOMATED     METABOLIC PANEL, COMPREHENSIVE    Collection Time: 10/23/21  1:25 PM   Result Value Ref Range    Sodium 127 (L) 136 - 145 mmol/L    Potassium 3.9 3.5 - 5.1 mmol/L    Chloride 90 (L) 97 - 108 mmol/L    CO2 29 21 - 32 mmol/L    Anion gap 8 5 - 15 mmol/L    Glucose 106 (H) 65 - 100 mg/dL    BUN 5 (L) 6 - 20 mg/dL    Creatinine 0.61 0.55 - 1.02 mg/dL    BUN/Creatinine ratio 8 (L) 12 - 20      GFR est AA >60 >60 ml/min/1.73m2    GFR est non-AA >60 >60 ml/min/1.73m2    Calcium 8.4 (L) 8.5 - 10.1 mg/dL    Bilirubin, total 0.4 0.2 - 1.0 mg/dL    AST (SGOT) 26 15 - 37 U/L    ALT (SGPT) 15 12 - 78 U/L    Alk. phosphatase 107 45 - 117 U/L    Protein, total 5.9 (L) 6.4 - 8.2 g/dL    Albumin 1.8 (L) 3.5 - 5.0 g/dL    Globulin 4.1 (H) 2.0 - 4.0 g/dL    A-G Ratio 0.4 (L) 1.1 - 2.2     TROPONIN-HIGH SENSITIVITY    Collection Time: 10/23/21  1:25 PM   Result Value Ref Range    Troponin-High Sensitivity 22 0 - 51 ng/L   NT-PRO BNP    Collection Time: 10/23/21  1:25 PM   Result Value Ref Range    NT pro-BNP 3,099 (H) <125 pg/mL   LACTIC ACID    Collection Time: 10/23/21  1:25 PM   Result Value Ref Range    Lactic acid 1.0 0.4 - 2.0 mmol/L   COVID-19 RAPID TEST    Collection Time: 10/23/21  1:45 PM   Result Value Ref Range    Specimen source Nasopharyngeal      COVID-19 rapid test Not Detected Not Detected               Assessment and Plan :     (1) Acute encephalopathy : GCS 12 likely opiate overdose accidental. Improved after narcan.will resume     (2) epidural abscess: s/p drainage on 9/15. daptomycin and rifampin for 4 more days    (2) CHFpEF: lasix 40 daily     (3) RA: prednisone daily     (4) fibromyalgia: complicates all the above. Will resume duloxetine, gabapentin and PRN flexiril. Dilaudid at half the dose    (5) sarcoidosis: not on o2, complicates all aspects of care    (6) depression and anxiety: see #4    (7) acute hypoxic respiratory failure: possibly because of central due to opiates, but CHF     (8) TIGRE: on cpap.             I am resuming dilaudid 1 mg every 6 hourly ( tomorrow increase to 2mg Q6H which is her home dose    D/C percocet    Resume: flexiril PRN and and gabapemntin at half the dose    D/d menantine, donepezil    Will also hold lisinopril and coreg    DVT ppx: lovenox     DISPO: rehab likely tuesday    Signed By: Kirby Samuels MD     October 23, 2021

## 2021-10-23 NOTE — ED PROVIDER NOTES
EMERGENCY DEPARTMENT HISTORY AND PHYSICAL EXAM      Date: 10/23/2021  Patient Name: Gerson Reynoso    History of Presenting Illness     Chief Complaint   Patient presents with    Altered mental status       History Provided By: Patient's  and EMS    HPI: Gerson Reynoso, 73 y.o. female with a past medical history significant hypertension and Sarcoidosis, RA, recent UTI presents to the ED with cc of altered mental status. According to reports, patient was at her baseline until given oxycodone at her living facility. Patient then reportedly went unresponsive. EMS gave patient 4 mg Narcan prior to arrival with improvement of patient's mentation, however with persistent confusion. Upon arrival to the ED, patient noted to be minimally responsive to pain with pinpoint pupils and additional 2 mg of Narcan administered. Patient again with improvement in her mentation. Patient required an additional dose of Narcan after she became unresponsive a third time. Patient's  at bedside states that he believes patient may have taken too many of her pain medications. Patient is noted to be borderline hypotensive. There are no other complaints, changes, or physical findings at this time. PCP: Swathi Bertrand MD    No current facility-administered medications on file prior to encounter. Current Outpatient Medications on File Prior to Encounter   Medication Sig Dispense Refill    cyclobenzaprine (FLEXERIL) 10 mg tablet Take 1 Tablet by mouth three (3) times daily as needed for Muscle Spasm(s). 30 Tablet 0    HYDROmorphone (Dilaudid) 2 mg tablet Take 2 mg by mouth every six (6) hours as needed for Pain.  ondansetron hcl (Zofran) 4 mg tablet Take 4 mg by mouth every six (6) hours as needed for Nausea or Vomiting.  cyclobenzaprine (FLEXERIL) 10 mg tablet Take 1 Tablet by mouth three (3) times daily as needed for Muscle Spasm(s).  30 Tablet 0    carvediloL (COREG) 6.25 mg tablet Take 6.25 mg by mouth two (2) times daily (with meals).  memantine (NAMENDA) 10 mg tablet Take 10 mg by mouth two (2) times a day.  donepeziL (ARICEPT) 5 mg tablet Take 5 mg by mouth nightly.  predniSONE (DELTASONE) 5 mg tablet Take 5 mg by mouth daily.  tiZANidine (ZANAFLEX) 4 mg capsule Take 8 mg by mouth three (3) times daily.  fluticasone furoate-vilanteroL (Breo Ellipta) 100-25 mcg/dose inhaler Take 1 Puff by inhalation daily.  albuterol (PROVENTIL HFA, VENTOLIN HFA, PROAIR HFA) 90 mcg/actuation inhaler Take 2 Puffs by inhalation every six (6) hours as needed for Wheezing.  alendronate (FOSAMAX) 35 mg tablet Take 35 mg by mouth every seven (7) days.  cyanocobalamin (VITAMIN B12) 1,000 mcg/mL injection 1,000 mcg by IntraMUSCular route every thirty (30) days.  metroNIDAZOLE (METROGEL) 0.75 % topical gel Apply  to affected area two (2) times a day.  gabapentin (NEURONTIN) 800 mg tablet Take 800 mg by mouth three (3) times daily.  escitalopram oxalate (LEXAPRO) 20 mg tablet Take 20 mg by mouth daily.  cetirizine (ZYRTEC) 10 mg tablet Take 10 mg by mouth daily.  amitriptyline (ELAVIL) 25 mg tablet Take 25 mg by mouth nightly.  lisinopril (PRINIVIL, ZESTRIL) 5 mg tablet Take 5 mg by mouth daily.  cevimeline (EVOXAC) 30 mg capsule Take 30 mg by mouth three (3) times daily.  DULoxetine (Cymbalta) 60 mg capsule Take 60 mg by mouth nightly.  levothyroxine (Synthroid) 150 mcg tablet Take 150 mcg by mouth daily.  Omeprazole delayed release (PRILOSEC D/R) 20 mg tablet Take 20 mg by mouth daily.          Past History     Past Medical History:  Past Medical History:   Diagnosis Date    Adverse effect of anesthesia     Arthritis     joint pain    Back pain     Balance problem     Bruises easily     Bursitis     Cataract     Chronic bronchitis (HCC)     Chronic fatigue syndrome     Chronic obstructive pulmonary disease ([address]     COVID-19 virus infection 02/2021    Edema of both legs     Esophageal reflux     Falls frequently 02/2018    6 times in the last 4 months    Fibroids     Fibromyalgia     GERD (gastroesophageal reflux disease)     Hiatal hernia     Hiatal hernia     History of peptic ulcer     Hypertension     IBS (irritable bowel syndrome)     Ill-defined condition     Irregular heart beat     Migraines     Neck pain     Obesity     Osteoporosis     Psychiatric disorder     DEPRESSION    PUD (peptic ulcer disease)     PVD (peripheral vascular disease) [facility]     RA (rheumatoid arthritis) ([facility]     Right leg numbness     Sarcoidosis     brain    Sarcoidosis of lung ([facility] [address] [facility] 2011    Short-term memory loss     SOB (shortness of breath)     Thyroid disease     hypothyroid    Tremors of nervous system     Urinary incontinence        Past Surgical History:  Past Surgical History:   Procedure Laterality Date    HX APPENDECTOMY  1968    HX BREAST LUMPECTOMY      X 6    HX CATARACT REMOVAL Bilateral 08/2014    HX CERVICAL FUSION  03/2017    HX CHOLECYSTECTOMY  1995    HX GASTRIC BYPASS  1978    HX HYSTERECTOMY  1983    HX LUMBAR FUSION  12/2012    HX ORTHOPAEDIC  2012    L1-L5    HX TONSILLECTOMY  2012    PICC INSERTION VASCULAR ACCESS TEAM  9/20/2021            Family History:  Family History   Problem Relation Age of Onset    Hypertension Mother     Thyroid Disease Mother     Hypertension Father     Other Father         angina    Hypertension Paternal Grandmother     Diabetes Paternal Grandmother     Diabetes Paternal Grandfather     Hypertension Paternal Grandfather        Social History:  Social History     Tobacco Use    Smoking status: Never Smoker    Smokeless tobacco: Never Used   Substance Use Topics    Alcohol use: Yes     Alcohol/week: 7.0 standard drinks     Types: 7 Shots of liquor per week     Comment: liquor or wine    Drug use: No       Allergies:   Allergies   Allergen Reactions    Other Food Hives and Unknown (comments)    Amoxicillin Rash    Erythromycin Anaphylaxis    Pcn [Penicillins] Anaphylaxis    Bactrim [Sulfamethoprim Ds] Unknown (comments)    Buckwheat Other (comments)    Cayenne Hives    Ceclor [Cefaclor] Unknown (comments) and Rash     Heavy rash    Cheese Other (comments)    Clarithromycin Unknown (comments)    Egg Runny Nose     Eyes water, runny nose    Fish Containing Products Rash     Jeanette    Gluten Other (comments)    Lactose Runny Nose     Eyes water, runny nose    Nuts [Nut - Unspecified] Hives, Runny Nose and Other (comments)     Pistachio also cause eyes to water    Oats Other (comments)    Safflower Oil Hives    Sesame Seed Other (comments)    Sulfa (Sulfonamide Antibiotics) Unknown (comments)    Sulfamethoxazole-Trimethoprim Unknown (comments) and Rash     Rash    Sulfur Hives and Itching    Tetracycline Rash     Swelling    Tetracyclines Unknown (comments)    Wheat Other (comments)         Review of Systems     Review of Systems   Unable to perform ROS: Mental status change       Physical Exam     Physical Exam  Constitutional:       Appearance: Normal appearance. She is ill-appearing. HENT:      Head: Normocephalic and atraumatic. Right Ear: External ear normal.      Left Ear: External ear normal.      Nose: Nose normal.      Mouth/Throat:      Mouth: Mucous membranes are moist.   Eyes:      Extraocular Movements: Extraocular movements intact. Right eye: No nystagmus. Left eye: No nystagmus. Conjunctiva/sclera: Conjunctivae normal.      Pupils:      Right eye: Pupil is not reactive. Left eye: Pupil is not reactive. Comments: Bilateral pinpoint pupils without ocular deviation or nystagmus   Cardiovascular:      Rate and Rhythm: Normal rate and regular rhythm. Pulses: Normal pulses. Heart sounds: Normal heart sounds. Pulmonary:      Effort: Pulmonary effort is normal. No respiratory distress. Breath sounds: Normal breath sounds. No stridor. No wheezing, rhonchi or rales. Abdominal:      General: Abdomen is flat. There is no distension. Palpations: Abdomen is soft. Tenderness: There is no abdominal tenderness. Comments: Midline scar   Musculoskeletal:         General: Normal range of motion. Cervical back: Normal range of motion. Skin:     General: Skin is warm and dry. Capillary Refill: Capillary refill takes less than 2 seconds. Neurological:      Mental Status: She is lethargic. GCS: GCS eye subscore is 3. GCS verbal subscore is 2. GCS motor subscore is 5.    Psychiatric:         Mood and Affect: Mood normal.         Behavior: Behavior normal.         Lab and Diagnostic Study Results     Labs -     Recent Results (from the past 12 hour(s))   URINALYSIS W/MICROSCOPIC    Collection Time: 10/23/21  1:00 PM   Result Value Ref Range    Color Yellow/Straw      Appearance Turbid (A) Clear      Specific gravity 1.011 1.003 - 1.030      pH (UA) 5.0 5.0 - 8.0      Protein Negative Negative mg/dL    Glucose Negative Negative mg/dL    Ketone 20 (A) Negative mg/dL    Bilirubin Negative Negative      Blood Negative Negative      Urobilinogen 0.1 0.1 - 1.0 EU/dL    Nitrites Negative Negative      Leukocyte Esterase Negative Negative      WBC 10-20 0 - 4 /hpf    RBC 0-5 0 - 5 /hpf    Bacteria Negative Negative /hpf    Mucus Trace (A) Negative /lpf    Hyaline cast 10-20 0 - 5 /lpf   DRUG SCREEN, URINE    Collection Time: 10/23/21  1:15 PM   Result Value Ref Range    AMPHETAMINES Negative Negative      BARBITURATES Negative Negative      BENZODIAZEPINES Negative Negative      COCAINE Negative Negative      METHADONE Negative Negative      OPIATES Positive (A) Negative      PCP(PHENCYCLIDINE) Negative Negative      THC (TH-CANNABINOL) Negative Negative      Drug screen comment        This test is a screen for drugs of abuse in a medical setting only (i.e., they are unconfirmed results and as such must not be used for non-medical purposes, e.g.,employment testing, legal testing). Due to its inherent nature, false positive (FP) and false negative (FN) results may be obtained. Therefore, if necessary for medical care, recommend confirmation of positive findings by GC/MS. GLUCOSE, POC    Collection Time: 10/23/21  1:20 PM   Result Value Ref Range    Glucose (POC) 124 (H) 65 - 117 mg/dL    Performed by Cathy Vieyra    CBC WITH AUTOMATED DIFF    Collection Time: 10/23/21  1:25 PM   Result Value Ref Range    WBC 13.2 (H) 3.6 - 11.0 K/uL    RBC 3.01 (L) 3.80 - 5.20 M/uL    HGB 8.2 (L) 11.5 - 16.0 g/dL    HCT 25.4 (L) 35.0 - 47.0 %    MCV 84.4 80.0 - 99.0 FL    MCH 27.2 26.0 - 34.0 PG    MCHC 32.3 30.0 - 36.5 g/dL    RDW 15.8 (H) 11.5 - 14.5 %    PLATELET 046 (H) 237 - 400 K/uL    MPV 9.2 8.9 - 12.9 FL    NRBC 0.0 0.0  WBC    ABSOLUTE NRBC 0.00 0.00 - 0.01 K/uL    NEUTROPHILS 78 (H) 32 - 75 %    LYMPHOCYTES 12 12 - 49 %    MONOCYTES 6 5 - 13 %    EOSINOPHILS 2 0 - 7 %    BASOPHILS 1 0 - 1 %    IMMATURE GRANULOCYTES 1 (H) 0 - 0.5 %    ABS. NEUTROPHILS 10.3 (H) 1.8 - 8.0 K/UL    ABS. LYMPHOCYTES 1.6 0.8 - 3.5 K/UL    ABS. MONOCYTES 0.8 0.0 - 1.0 K/UL    ABS. EOSINOPHILS 0.2 0.0 - 0.4 K/UL    ABS. BASOPHILS 0.1 0.0 - 0.1 K/UL    ABS. IMM. GRANS. 0.2 (H) 0.00 - 0.04 K/UL    DF AUTOMATED     METABOLIC PANEL, COMPREHENSIVE    Collection Time: 10/23/21  1:25 PM   Result Value Ref Range    Sodium 127 (L) 136 - 145 mmol/L    Potassium 3.9 3.5 - 5.1 mmol/L    Chloride 90 (L) 97 - 108 mmol/L    CO2 29 21 - 32 mmol/L    Anion gap 8 5 - 15 mmol/L    Glucose 106 (H) 65 - 100 mg/dL    BUN 5 (L) 6 - 20 mg/dL    Creatinine 0.61 0.55 - 1.02 mg/dL    BUN/Creatinine ratio 8 (L) 12 - 20      GFR est AA >60 >60 ml/min/1.73m2    GFR est non-AA >60 >60 ml/min/1.73m2    Calcium 8.4 (L) 8.5 - 10.1 mg/dL    Bilirubin, total 0.4 0.2 - 1.0 mg/dL    AST (SGOT) 26 15 - 37 U/L    ALT (SGPT) 15 12 - 78 U/L    Alk. phosphatase 107 45 - 117 U/L    Protein, total 5.9 (L) 6.4 - 8.2 g/dL    Albumin 1.8 (L) 3.5 - 5.0 g/dL    Globulin 4.1 (H) 2.0 - 4.0 g/dL    A-G Ratio 0.4 (L) 1.1 - 2.2     TROPONIN-HIGH SENSITIVITY    Collection Time: 10/23/21  1:25 PM   Result Value Ref Range    Troponin-High Sensitivity 22 0 - 51 ng/L   NT-PRO BNP    Collection Time: 10/23/21  1:25 PM   Result Value Ref Range    NT pro-BNP 3,099 (H) <125 pg/mL   LACTIC ACID    Collection Time: 10/23/21  1:25 PM   Result Value Ref Range    Lactic acid 1.0 0.4 - 2.0 mmol/L   COVID-19 RAPID TEST    Collection Time: 10/23/21  1:45 PM   Result Value Ref Range    Specimen source Nasopharyngeal      COVID-19 rapid test Not Detected Not Detected         Radiologic Studies -   @[email or web address]@  CT Results  (Last 48 hours)               10/23/21 1333  CT HEAD WO CONT Final result    Impression:  Motion degraded, limited study. Within technical confines of this exam, no   evidence of acute intracranial process. Narrative:  CT HEAD WITHOUT IV CONTRAST       CLINICAL INDICATION: Altered mental status       TECHNIQUE: Routine axial images were obtained through the brain without the use   of IV contrast. Sagittal and coronal reformatted images were performed at the CT   console. Dose reduction: Per department policy, all CT scans at this facility   are performed using dose reduction optimization techniques as appropriate to a   performed examination including the following: Automated exposure control,   adjustments of the mA and/or KV according to patient size, or use of iterative   reconstruction technique. COMPARISON: MRI brain from 4/12/2014       FINDINGS:    Motion degraded, limited exam.   No evidence of acute intracranial hemorrhage or mass effect. Ventricles and extra-axial spaces are normal in size and configuration for age. Portions of the posterior fossa not obscured by streak artifact are   unremarkable. Absent bilateral native lenses.    Paranasal sinuses are predominantly clear. Tympanomastoid cavities are clear. No acute calvarial abnormality. Visualized major intracranial vasculature is unremarkable in noncontrast CT   appearance. CXR Results  (Last 48 hours)               10/23/21 1405  XR CHEST PORT Final result    Impression:  Cardiomediastinal enlargement with vascular congestion. Basilar   prominent lung markings, consistent with edema and/or atelectasis. Correlate   clinically. Interim cervical and lumbar spine surgery compared with 2011. Narrative:  Portable chest, AP upright, 1341 hours. Comparison with 12/8/2011. Left upper   abdomen clips, stable. Upper lumbar and thoracolumbar junction spinal hardware,   new since the prior exam. Lower anterior cervical anterior instrumented spinal   fusion, new since the prior exam. Relative pulmonary hypoinflation. Increased   size of cardiomediastinal silhouette, at least in part magnified by exam   technique and pulmonary hypoinflation but possibly also related to enlarged   heart size or pericardial fluid. Central vascular congestion and prominent   basilar lung markings, accentuated by pulmonary hypoinflation. Normal appearance   of right PICC line. No pneumothorax. Medical Decision Making   - I am the first provider for this patient. - I reviewed the vital signs, available nursing notes, past medical history, past surgical history, family history and social history. - Initial assessment performed. The patients presenting problems have been discussed, and they are in agreement with the care plan formulated and outlined with them. I have encouraged them to ask questions as they arise throughout their visit. Vital Signs-Reviewed the patient's vital signs.   Patient Vitals for the past 12 hrs:   Temp Pulse Resp BP SpO2   10/23/21 1554 98.2 °F (36.8 °C) 65 20 113/88 100 %   10/23/21 1433  64 20 131/66 100 %   10/23/21 1357     98 %   10/23/21 1340  70 20 123/72 100 %   10/23/21 1336  68 20 (!) 81/46 94 %   10/23/21 1314 97.6 °F (36.4 °C) 77 20 (!) 80/43 97 %       Records Reviewed: Nursing Notes    The patient presents with altered mental status with a differential diagnosis of  cerebral hemorrhage, chronic dementia, encephalopathy, epidural hematoma, hepatic encephalopathy, hypernatremia, hyponatremia, hypoxia, ICB, UTI and narcotic overdose      ED Course:          Provider Notes (Medical Decision Making):   Patient is a 57-year-old female who presents for evaluation of altered mental status that began just prior to arrival after taking oxycodone.  states she believes patient may have been accidentally taking her pain medication unknowingly. Patient requiring multiple doses of Narcan in the department with improvement in her mentation after administration. On physical examination, patient has pinpoint pupils and altered mental status which does respond and improve with IV Narcan. Hypotension resolved with IV fluids. Work-up demonstrating slight leukocytosis with pulmonary vascular congestion and elevated BNP- unknown baseline. Patient hyponatremic at 127. Signout given to Dr. Manas Alicea for admission and further treatment/evaluation. MDM       Procedures   Medical Decision Makingedical Decision Making  Performed by: Katy Hunt DO  Procedures       Disposition   Disposition: Admitted to Floor Medical Floor the case was discussed with the admitting physician Issac    Admitted    DISCHARGE PLAN:  1. Current Discharge Medication List      CONTINUE these medications which have NOT CHANGED    Details   !! cyclobenzaprine (FLEXERIL) 10 mg tablet Take 1 Tablet by mouth three (3) times daily as needed for Muscle Spasm(s). Qty: 30 Tablet, Refills: 0    Associated Diagnoses: Wound infection after surgery      HYDROmorphone (Dilaudid) 2 mg tablet Take 2 mg by mouth every six (6) hours as needed for Pain.       ondansetron hcl (Zofran) 4 mg tablet Take 4 mg by mouth every six (6) hours as needed for Nausea or Vomiting. !! cyclobenzaprine (FLEXERIL) 10 mg tablet Take 1 Tablet by mouth three (3) times daily as needed for Muscle Spasm(s). Qty: 30 Tablet, Refills: 0    Associated Diagnoses: Thoracic stenosis      carvediloL (COREG) 6.25 mg tablet Take 6.25 mg by mouth two (2) times daily (with meals). memantine (NAMENDA) 10 mg tablet Take 10 mg by mouth two (2) times a day. donepeziL (ARICEPT) 5 mg tablet Take 5 mg by mouth nightly. predniSONE (DELTASONE) 5 mg tablet Take 5 mg by mouth daily. tiZANidine (ZANAFLEX) 4 mg capsule Take 8 mg by mouth three (3) times daily. fluticasone furoate-vilanteroL (Breo Ellipta) 100-25 mcg/dose inhaler Take 1 Puff by inhalation daily. albuterol (PROVENTIL HFA, VENTOLIN HFA, PROAIR HFA) 90 mcg/actuation inhaler Take 2 Puffs by inhalation every six (6) hours as needed for Wheezing. alendronate (FOSAMAX) 35 mg tablet Take 35 mg by mouth every seven (7) days. cyanocobalamin (VITAMIN B12) 1,000 mcg/mL injection 1,000 mcg by IntraMUSCular route every thirty (30) days. metroNIDAZOLE (METROGEL) 0.75 % topical gel Apply  to affected area two (2) times a day.      gabapentin (NEURONTIN) 800 mg tablet Take 800 mg by mouth three (3) times daily. escitalopram oxalate (LEXAPRO) 20 mg tablet Take 20 mg by mouth daily. cetirizine (ZYRTEC) 10 mg tablet Take 10 mg by mouth daily. amitriptyline (ELAVIL) 25 mg tablet Take 25 mg by mouth nightly. lisinopril (PRINIVIL, ZESTRIL) 5 mg tablet Take 5 mg by mouth daily. cevimeline (EVOXAC) 30 mg capsule Take 30 mg by mouth three (3) times daily. DULoxetine (Cymbalta) 60 mg capsule Take 60 mg by mouth nightly. levothyroxine (Synthroid) 150 mcg tablet Take 150 mcg by mouth daily. Omeprazole delayed release (PRILOSEC D/R) 20 mg tablet Take 20 mg by mouth daily. !! - Potential duplicate medications found.  Please discuss with provider. 2.   Follow-up Information    None       3. Return to ED if worse   4. Current Discharge Medication List            Diagnosis     Clinical Impression:   1. Altered mental status, unspecified altered mental status type    2. Hyponatremia        Attestations:    Marie Garcia, DO    Please note that this dictation was completed with [employer]OR[employer], the computer voice recognition software. Quite often unanticipated grammatical, syntax, homophones, and other interpretive errors are inadvertently transcribed by the computer software. Please disregard these errors. Please excuse any errors that have escaped final proofreading. Thank you.

## 2021-10-24 LAB
GLUCOSE BLD STRIP.AUTO-MCNC: 99 MG/DL (ref 65–117)
PERFORMED BY, TECHID: NORMAL
TSH SERPL DL<=0.05 MIU/L-ACNC: 46.9 UIU/ML (ref 0.36–3.74)

## 2021-10-24 PROCEDURE — 74011000258 HC RX REV CODE- 258: Performed by: HOSPITALIST

## 2021-10-24 PROCEDURE — 74011636637 HC RX REV CODE- 636/637: Performed by: HOSPITALIST

## 2021-10-24 PROCEDURE — 74011250636 HC RX REV CODE- 250/636: Performed by: HOSPITALIST

## 2021-10-24 PROCEDURE — 74011250637 HC RX REV CODE- 250/637: Performed by: HOSPITALIST

## 2021-10-24 PROCEDURE — 65270000029 HC RM PRIVATE

## 2021-10-24 PROCEDURE — 82962 GLUCOSE BLOOD TEST: CPT

## 2021-10-24 RX ORDER — HYDROMORPHONE HYDROCHLORIDE 2 MG/1
1 TABLET ORAL
Status: DISCONTINUED | OUTPATIENT
Start: 2021-10-24 | End: 2021-10-24

## 2021-10-24 RX ORDER — AMITRIPTYLINE HYDROCHLORIDE 25 MG/1
25 TABLET, FILM COATED ORAL
Status: DISCONTINUED | OUTPATIENT
Start: 2021-10-24 | End: 2021-10-27 | Stop reason: HOSPADM

## 2021-10-24 RX ORDER — GABAPENTIN 400 MG/1
400 CAPSULE ORAL 3 TIMES DAILY
Status: DISCONTINUED | OUTPATIENT
Start: 2021-10-24 | End: 2021-10-27 | Stop reason: HOSPADM

## 2021-10-24 RX ORDER — HYDROMORPHONE HYDROCHLORIDE 2 MG/1
2 TABLET ORAL
Status: DISCONTINUED | OUTPATIENT
Start: 2021-10-24 | End: 2021-10-27 | Stop reason: HOSPADM

## 2021-10-24 RX ORDER — RIFAMPIN 300 MG/1
600 CAPSULE ORAL
Qty: 2 CAPSULE | Refills: 0 | Status: SHIPPED | OUTPATIENT
Start: 2021-10-25 | End: 2021-10-27

## 2021-10-24 RX ORDER — CYCLOBENZAPRINE HCL 10 MG
10 TABLET ORAL
Status: DISCONTINUED | OUTPATIENT
Start: 2021-10-24 | End: 2021-10-27 | Stop reason: HOSPADM

## 2021-10-24 RX ORDER — DULOXETIN HYDROCHLORIDE 30 MG/1
60 CAPSULE, DELAYED RELEASE ORAL
Status: DISCONTINUED | OUTPATIENT
Start: 2021-10-24 | End: 2021-10-27 | Stop reason: HOSPADM

## 2021-10-24 RX ORDER — ESCITALOPRAM OXALATE 10 MG/1
20 TABLET ORAL DAILY
Status: DISCONTINUED | OUTPATIENT
Start: 2021-10-24 | End: 2021-10-27 | Stop reason: HOSPADM

## 2021-10-24 RX ADMIN — AMITRIPTYLINE HYDROCHLORIDE 25 MG: 25 TABLET, FILM COATED ORAL at 21:01

## 2021-10-24 RX ADMIN — PANTOPRAZOLE SODIUM 40 MG: 40 TABLET, DELAYED RELEASE ORAL at 08:56

## 2021-10-24 RX ADMIN — CEVIMELINE 30 MG: 30 CAPSULE ORAL at 09:05

## 2021-10-24 RX ADMIN — ESCITALOPRAM OXALATE 20 MG: 10 TABLET ORAL at 11:30

## 2021-10-24 RX ADMIN — CYCLOBENZAPRINE 10 MG: 10 TABLET, FILM COATED ORAL at 17:08

## 2021-10-24 RX ADMIN — DULOXETINE 60 MG: 30 CAPSULE, DELAYED RELEASE ORAL at 21:01

## 2021-10-24 RX ADMIN — HYDROMORPHONE HYDROCHLORIDE 2 MG: 2 TABLET ORAL at 09:05

## 2021-10-24 RX ADMIN — GABAPENTIN 400 MG: 400 CAPSULE ORAL at 21:01

## 2021-10-24 RX ADMIN — RIFAMPIN 600 MG: 300 CAPSULE ORAL at 11:30

## 2021-10-24 RX ADMIN — GABAPENTIN 400 MG: 400 CAPSULE ORAL at 16:57

## 2021-10-24 RX ADMIN — GABAPENTIN 400 MG: 400 CAPSULE ORAL at 08:56

## 2021-10-24 RX ADMIN — PREDNISONE 5 MG: 5 TABLET ORAL at 08:56

## 2021-10-24 RX ADMIN — HYDROMORPHONE HYDROCHLORIDE 2 MG: 2 TABLET ORAL at 17:08

## 2021-10-24 RX ADMIN — Medication 10 ML: at 16:58

## 2021-10-24 RX ADMIN — DAPTOMYCIN 650 MG: 500 INJECTION, POWDER, LYOPHILIZED, FOR SOLUTION INTRAVENOUS at 22:27

## 2021-10-24 RX ADMIN — CYCLOBENZAPRINE 10 MG: 10 TABLET, FILM COATED ORAL at 08:56

## 2021-10-24 RX ADMIN — Medication 10 ML: at 05:30

## 2021-10-24 RX ADMIN — LEVOTHYROXINE SODIUM 150 MCG: 0.07 TABLET ORAL at 08:55

## 2021-10-24 RX ADMIN — ENOXAPARIN SODIUM 40 MG: 40 INJECTION SUBCUTANEOUS at 08:58

## 2021-10-24 RX ADMIN — Medication 10 ML: at 21:01

## 2021-10-24 RX ADMIN — CEVIMELINE 30 MG: 30 CAPSULE ORAL at 16:57

## 2021-10-24 NOTE — PROGRESS NOTES
Dr. Wellington Gan provided verbal orders to d/c ST swallow consult and reports patient is at baseline. Please reconsult as medically necessary.  Thank you

## 2021-10-24 NOTE — PROGRESS NOTES
Reason for Admission:   Acute Encephalopathy                    RUR Score:   19%               PCP: First and Last name:   Donnie Riggins     Name of Practice:    Are you a current patient: Yes/No:    Approximate date of last visit: Unsure   Can you participate in a virtual visit if needed:     Do you (patient/family) have any concerns for transition/discharge? [facility]ing [facility] for utilizing home health:   Patient to return to SNF    Current Advanced Directive/Advance Care Plan:  Full Code      Healthcare Decision Maker:   Click here to complete [address] including selection of the [address] Relationship (ie \"Primary\")            Primary Decision Maker: Kirit Rauschmerna - Spouse - [phone]    Transition of Care Plan:        Patient was currently in rehab at [address] at [facility]. Patient and  are requesting that patient return to [facility] at discharge.   Choice letter signed and CM sent referral.  Current Dispo: SNF

## 2021-10-24 NOTE — DISCHARGE SUMMARY
Physician Discharge Summary     Patient ID:    Tolu Horton  [id number]  73 y.o.  1950    Admit date: 10/23/2021    Discharge date : 10/24/2021    Chronic Diagnoses:    Problem List as of 10/24/2021 Date Reviewed: 9/9/2021        Codes Class Noted - Resolved    Acute encephalopathy ICD-10-CM: G93.40  ICD-9-CM: 348.30  10/23/2021 - Present        Sepsis ([address] ICD-10-CM: A41.9  ICD-9-CM: 038.9, 995.91  9/9/2021 - Present        Thoracic stenosis ICD-10-CM: M48.04  ICD-9-CM: 724.01  8/27/2021 - Present        HNP (herniated nucleus pulposus), lumbar ICD-10-CM: M51.26  ICD-9-CM: 722.10  3/16/2018 - Present          22    Final Diagnoses:   Acute encephalopathy [G93.40]    Reason for Hospitalization:       (1) Acute encephalopathy :   (2) epidural abscess:    (2) CHFpEF:      (3) RA: prednisone daily      (4) fibromyalgia:      (5) sarcoidosis: not on o2, complicates all aspects of care     (6) depression and anxiety: see #4     (7) acute hypoxic respiratory failure:     (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 Hospital Course:        71F, h/o sjogrens syndrome, RA on prednisone, fibromylgia, depression, COPD not on oxygen and CHFpEF not on lasix     A brief into: Importantly she has a history of  Thoracic spinal stenosis, s/p laminectomy. On 9/15/2021 [facility]- a complicated hospital stay where she presented with septic shock s/p epidural abscess s/p drainage that grew MRSA, she has a PICC line and was on daptomycin and rifampin for 6 weeks. SHE HAD INADEQUATE RESPONSE TO [facility] Kayser has 4 more days of abx as per the charts. And it was in that admission she was started on percocet     Not to mention she is on dilaudid 2 mg every 6 hourly, with flexeril, Zanaflex, amitriptyline, duloxetine      She present with unresponsiveness from the nursing home in the morning. She arrived via EMS and received 4mg narcan, and ER gave 6 mg naracn with improvement in mentation.  She was on 6L and weaned      She improved clinically, and Tuesday being the last dose of her antibiotics she was planned for discharge     It was advised to stop percocet and continue on the same regimen of her pain meds. She will go back to the rehab, and followup with PCP for pain meds weaning, neurosurgery as scheduled. INSTRUCTIONS ON DISCHARGE     (1) please STOP percocet. (2) F/u with PCP in 1 week and we have to try and wean off medications     (3) keep your followups with ID and neurosurgery as scheduled      Discharge Medications:   Current Discharge Medication List      START taking these medications    Details   DAPTOmycin (CUBICIN) IVPB 673.2 mg by IntraVENous route every twenty-four (24) hours for 1 day. Qty: 1 Dose, Refills: 0  Start date: 10/24/2021, End date: 10/25/2021      rifAMPin (RIFADIN) 300 mg capsule Take 2 Capsules by mouth Daily (before breakfast) for 1 day. Qty: 2 Capsule, Refills: 0  Start date: 10/25/2021, End date: 10/26/2021         CONTINUE these medications which have NOT CHANGED    Details   !! cyclobenzaprine (FLEXERIL) 10 mg tablet Take 1 Tablet by mouth three (3) times daily as needed for Muscle Spasm(s). Qty: 30 Tablet, Refills: 0    Associated Diagnoses: Wound infection after surgery      HYDROmorphone (Dilaudid) 2 mg tablet Take 2 mg by mouth every six (6) hours as needed for Pain. ondansetron hcl (Zofran) 4 mg tablet Take 4 mg by mouth every six (6) hours as needed for Nausea or Vomiting. !! cyclobenzaprine (FLEXERIL) 10 mg tablet Take 1 Tablet by mouth three (3) times daily as needed for Muscle Spasm(s). Qty: 30 Tablet, Refills: 0    Associated Diagnoses: Thoracic stenosis      carvediloL (COREG) 6.25 mg tablet Take 6.25 mg by mouth two (2) times daily (with meals). memantine (NAMENDA) 10 mg tablet Take 10 mg by mouth two (2) times a day. donepeziL (ARICEPT) 5 mg tablet Take 5 mg by mouth nightly.       predniSONE (DELTASONE) 5 mg tablet Take 5 mg by mouth daily. tiZANidine (ZANAFLEX) 4 mg capsule Take 8 mg by mouth three (3) times daily. fluticasone furoate-vilanteroL (Breo Ellipta) 100-25 mcg/dose inhaler Take 1 Puff by inhalation daily. albuterol (PROVENTIL HFA, VENTOLIN HFA, PROAIR HFA) 90 mcg/actuation inhaler Take 2 Puffs by inhalation every six (6) hours as needed for Wheezing. alendronate (FOSAMAX) 35 mg tablet Take 35 mg by mouth every seven (7) days. cyanocobalamin (VITAMIN B12) 1,000 mcg/mL injection 1,000 mcg by IntraMUSCular route every thirty (30) days. metroNIDAZOLE (METROGEL) 0.75 % topical gel Apply  to affected area two (2) times a day.      gabapentin (NEURONTIN) 800 mg tablet Take 800 mg by mouth three (3) times daily. escitalopram oxalate (LEXAPRO) 20 mg tablet Take 20 mg by mouth daily. cetirizine (ZYRTEC) 10 mg tablet Take 10 mg by mouth daily. amitriptyline (ELAVIL) 25 mg tablet Take 25 mg by mouth nightly. lisinopril (PRINIVIL, ZESTRIL) 5 mg tablet Take 5 mg by mouth daily. cevimeline (EVOXAC) 30 mg capsule Take 30 mg by mouth three (3) times daily. DULoxetine (Cymbalta) 60 mg capsule Take 60 mg by mouth nightly. levothyroxine (Synthroid) 150 mcg tablet Take 150 mcg by mouth daily. Omeprazole delayed release (PRILOSEC D/R) 20 mg tablet Take 20 mg by mouth daily. !! - Potential duplicate medications found. Please discuss with provider. Follow up Care:    1. Cliff Ferrell MD in 1-2 weeks. Please call to set up an appointment shortly after discharge. Diet:  regulkar    Disposition:  HH or IRF    Advanced Directive:   FULL x   DNR      Discharge Exam:  Physical Exam  Constitutional:       Appearance: Normal appearance.  She is  HENT:      Head: Normocephalic and atraumatic.      Right Ear: External ear normal.      Left Ear: External ear normal.      Nose: Nose normal.      Mouth/Throat:      Mouth: Mucous membranes are moist.   Eyes:    Extraocular Movements: Extraocular movements intact.      Right eye: No nystagmus.      Left eye: No nystagmus.      Conjunctiva/sclera: Conjunctivae normal.      Pupils:         Comments:   Cardiovascular:      Rate and Rhythm: Normal rate and regular rhythm.      Pulses: Normal pulses.      Heart sounds: Normal heart sounds. Pulmonary:      Effort: Pulmonary effort is normal. No respiratory distress.      Breath sounds: Normal breath sounds. No stridor. No wheezing, rhonchi or rales. Abdominal:      General: Abdomen is flat. There is no distension.      Palpations: Abdomen is soft.      Tenderness: There is no abdominal tenderness.      Comments: Midline scar   Musculoskeletal:         General: Normal range of motion.      Cervical back: Normal range of motion. Skin:     General: Skin is warm and dry.      Capillary Refill: Capillary refill takes less than 2 seconds. Neurological:      Mental Status: She isalert.      GCS: GCS eye subscore is 3. GCS verbal subscore is 2. GCS motor subscore is 5. Psychiatric: Elspeth Majestic and Affect: Mood normal.         Behavior: Behavior normal.     CONSULTATIONS:none    Significant Diagnostic Studies:     Radiologic Studies -   Results from Hospital Encounter encounter on 10/23/21    XR CHEST PORT    Narrative  Portable chest, AP upright, 1341 hours. Comparison with 12/8/2011. Left upper  abdomen clips, stable. Upper lumbar and thoracolumbar junction spinal hardware,  new since the prior exam. Lower anterior cervical anterior instrumented spinal  fusion, new since the prior exam. Relative pulmonary hypoinflation. Increased  size of cardiomediastinal silhouette, at least in part magnified by exam  technique and pulmonary hypoinflation but possibly also related to enlarged  heart size or pericardial fluid. Central vascular congestion and prominent  basilar lung markings, accentuated by pulmonary hypoinflation. Normal appearance  of right PICC line.  No pneumothorax. Impression  Cardiomediastinal enlargement with vascular congestion. Basilar  prominent lung markings, consistent with edema and/or atelectasis. Correlate  clinically. Interim cervical and lumbar spine surgery compared with 2011. CT Results  (Last 48 hours)               10/23/21 1333  CT HEAD WO CONT Final result    Impression:  Motion degraded, limited study. Within technical confines of this exam, no   evidence of acute intracranial process. Narrative:  CT HEAD WITHOUT IV CONTRAST       CLINICAL INDICATION: Altered mental status       TECHNIQUE: Routine axial images were obtained through the brain without the use   of IV contrast. Sagittal and coronal reformatted images were performed at the CT   console. Dose reduction: Per department policy, all CT scans at this facility   are performed using dose reduction optimization techniques as appropriate to a   performed examination including the following: Automated exposure control,   adjustments of the mA and/or KV according to patient size, or use of iterative   reconstruction technique. COMPARISON: MRI brain from 4/12/2014       FINDINGS:    Motion degraded, limited exam.   No evidence of acute intracranial hemorrhage or mass effect. Ventricles and extra-axial spaces are normal in size and configuration for age. Portions of the posterior fossa not obscured by streak artifact are   unremarkable. Absent bilateral native lenses. Paranasal sinuses are predominantly clear. Tympanomastoid cavities are clear. No acute calvarial abnormality. Visualized major intracranial vasculature is unremarkable in noncontrast CT   appearance.                      Discharge time spent 35 minutes    Signed:  Florin Wray MD  10/24/2021  12:50 PM

## 2021-10-24 NOTE — PROGRESS NOTES
Progress Note    Patient: Neena Alvarado MRN: [id number]  SSN: [id number]    YOB: 1950  Age: 73 y.o. Sex: female      Admit Date: 10/23/2021    LOS: 1 day     Subjective:     71F, h/o sjogrens syndrome, RA on prednisone, fibromylgia, depression, COPD not on oxygen and CHFpEF not on lasix     A brief into: Importantly she has a history of  Thoracic spinal stenosis, s/p laminectomy. On 9/15/2021 she was discharged from [facility]- a complicated hospital stay where she presented with septic shock s/p epidural abscess s/p drainage that grew MRSA, she has a PICC line and was on daptomycin and rifampin for 6 weeks. SHE HAD INADEQUATE RESPONSE TO VANCOMYCIN She has 4 more days of abx as per the charts. And it was in that admission she was started on percocet     Not to mention she is on dilaudid 2 mg every 6 hourly, with flexeril, Zanaflex, amitriptyline, duloxetine      She present with unresponsiveness from the nursing home in the morning. She arrived via EMS and received 4mg narcan, and ER gave 6 mg naracn with improvement in mentation. She was on 6L and weaned      Currently she is complaining of abdominal pain     She denies hitting the head, diarrhea,      ER: CT head no IC bleed    She improved clinically, and Tuesday being the last dose of her antibiotics she was planned for discharge    It was advised to stop percocet and continue on the same regimen of her pain meds. She will go back to the rehab, and followup with PCP for pain meds weaning, neurosurgery as scheduled. Review of Systems:  Constitutional:  denies weight loss, no fever, no chills, no night sweats  Eye: No recent visual disturbances, no discharge, no double vision  Ear/nose/mouth/throat : No hearing disturbance, no ear pain, no nasal congestion, no sore throat, no trouble swallowing.   Respiratory : No trouble breathing, no cough, no shortness of breath, no hemoptysis, no wheezing  Cardiovascular : No chest pain, no palpitation, no racing of heart, no orthopnea, no paroxysmal nocturnal dyspnea, no peripheral edema  Gastrointestinal : No nausea, no vomiting, no diarrhea, constipation, heartburn, abdominal pain  Genitourinary : No dysuria, no hematuria, no increased frequency, incontinence,  Lymphatics : No swollen glands -Neck, axillary, inguinal  Endocrine : No excessive thirst, no polyuria no cold intolerance, no heat intolerance. Immunologic : No hives, urticaria, no seasonal allergies,   Musculoskeletal : Left upper back pain. No joint swelling, pain, effusion,  no neck pain,   Integumentary : No rash, no pruritus, no ecchymosis  Hematology : No petechiae, No easy bruising,  No tendency to bleed easy  Neurology : Denies change in mental status, no abnormal balance, no headache, no confusion, numbness, tingling,  Psychiatric : No mood swings, no anxiety, depression      Objective:     Vitals:    10/23/21 1800 10/23/21 2110 10/24/21 0132 10/24/21 0801   BP: 136/67 (!) 113/52 135/61 (!) 178/73   Pulse: 66 71 74 81   Resp: 20 20 20 18   Temp: 98.6 °F (37 °C) 98.5 °F (36.9 °C) 98 °F (36.7 °C) 98.5 °F (36.9 °C)   SpO2: 100% 100% 98% 98%   Weight:            Intake and Output:  Current Shift: No intake/output data recorded. Last three shifts: No intake/output data recorded. Physical Exam:   Physical Exam  Constitutional:       Appearance: Normal appearance. She is ill-appearing.    HENT:      Head: Normocephalic and atraumatic.      Right Ear: External ear normal.      Left Ear: External ear normal.      Nose: Nose normal.      Mouth/Throat:      Mouth: Mucous membranes are moist.   Eyes:      Extraocular Movements: Extraocular movements intact.      Right eye: No nystagmus.      Left eye: No nystagmus.      Conjunctiva/sclera: Conjunctivae normal.      Pupils:      Right eye: Pupil is not reactive.      Left eye: Pupil is not reactive.      Comments: Bilateral pinpoint pupils without ocular deviation or nystagmus   Cardiovascular:      Rate and Rhythm: Normal rate and regular rhythm.      Pulses: Normal pulses.      Heart sounds: Normal heart sounds. Pulmonary:      Effort: Pulmonary effort is normal. No respiratory distress.      Breath sounds: Normal breath sounds. No stridor. No wheezing, rhonchi or rales. Abdominal:      General: Abdomen is flat. There is no distension.      Palpations: Abdomen is soft.      Tenderness: There is no abdominal tenderness.      Comments: Midline scar   Musculoskeletal:         General: Normal range of motion.      Cervical back: Normal range of motion. Skin:     General: Skin is warm and dry.      Capillary Refill: Capillary refill takes less than 2 seconds. Neurological:      Mental Status: She is lethargic.      GCS: GCS eye subscore is 3. GCS verbal subscore is 2. GCS motor subscore is 5.    Psychiatric:         Mood and Affect: Mood normal.         Behavior: Behavior normal.       Lab/Data Review:  Recent Results (from the past 24 hour(s))   URINALYSIS W/MICROSCOPIC    Collection Time: 10/23/21  1:00 PM   Result Value Ref Range    Color Yellow/Straw      Appearance Turbid (A) Clear      Specific gravity 1.011 1.003 - 1.030      pH (UA) 5.0 5.0 - 8.0      Protein Negative Negative mg/dL    Glucose Negative Negative mg/dL    Ketone 20 (A) Negative mg/dL    Bilirubin Negative Negative      Blood Negative Negative      Urobilinogen 0.1 0.1 - 1.0 EU/dL    Nitrites Negative Negative      Leukocyte Esterase Negative Negative      WBC 10-20 0 - 4 /hpf    RBC 0-5 0 - 5 /hpf    Bacteria Negative Negative /hpf    Mucus Trace (A) Negative /lpf    Hyaline cast 10-20 0 - 5 /lpf   EKG, 12 LEAD, INITIAL    Collection Time: 10/23/21  1:13 PM   Result Value Ref Range    Ventricular Rate 70 BPM    Atrial Rate 70 BPM    P-R Interval 186 ms    QRS Duration 108 ms    Q-T Interval 498 ms    QTC Calculation (Bezet) 537 ms    Calculated P Axis 41 degrees    Calculated R Axis 15 degrees Calculated T Axis 18 degrees    Diagnosis       Normal sinus rhythm  Incomplete right bundle branch block  Prolonged QT  Abnormal ECG  No previous ECGs available  Confirmed by Margarita RODRIGUEZ MD ([id number]) on 10/23/2021 7:20:19 PM     DRUG SCREEN, URINE    Collection Time: 10/23/21  1:15 PM   Result Value Ref Range    AMPHETAMINES Negative Negative      BARBITURATES Negative Negative      BENZODIAZEPINES Negative Negative      COCAINE Negative Negative      METHADONE Negative Negative      OPIATES Positive (A) Negative      PCP(PHENCYCLIDINE) Negative Negative      THC (TH-CANNABINOL) Negative Negative      Drug screen comment        This test is a screen for drugs of abuse in a medical setting only (i.e., they are unconfirmed results and as such must not be used for non-medical purposes, e.g.,employment testing, legal testing). Due to its inherent nature, false positive (FP) and false negative (FN) results may be obtained. Therefore, if necessary for medical care, recommend confirmation of positive findings by GC/MS. GLUCOSE, POC    Collection Time: 10/23/21  1:20 PM   Result Value Ref Range    Glucose (POC) 124 (H) 65 - 117 mg/dL    Performed by [place]    CBC WITH AUTOMATED DIFF    Collection Time: 10/23/21  1:25 PM   Result Value Ref Range    WBC 13.2 (H) 3.6 - 11.0 K/uL    RBC 3.01 (L) 3.80 - 5.20 M/uL    HGB 8.2 (L) 11.5 - 16.0 g/dL    HCT 25.4 (L) 35.0 - 47.0 %    MCV 84.4 80.0 - 99.0 FL    MCH 27.2 26.0 - 34.0 PG    MCHC 32.3 30.0 - 36.5 g/dL    RDW 15.8 (H) 11.5 - 14.5 %    PLATELET 038 (H) 924 - 400 K/uL    MPV 9.2 8.9 - 12.9 FL    NRBC 0.0 0.0  WBC    ABSOLUTE NRBC 0.00 0.00 - 0.01 K/uL    NEUTROPHILS 78 (H) 32 - 75 %    LYMPHOCYTES 12 12 - 49 %    MONOCYTES 6 5 - 13 %    EOSINOPHILS 2 0 - 7 %    BASOPHILS 1 0 - 1 %    IMMATURE GRANULOCYTES 1 (H) 0 - 0.5 %    ABS. NEUTROPHILS 10.3 (H) 1.8 - 8.0 K/UL    ABS. LYMPHOCYTES 1.6 0.8 - 3.5 K/UL    ABS. MONOCYTES 0.8 0.0 - 1.0 K/UL    ABS. EOSINOPHILS 0.2 0.0 - 0.4 K/UL    ABS. BASOPHILS 0.1 0.0 - 0.1 K/UL    ABS. IMM. GRANS. 0.2 (H) 0.00 - 0.04 K/UL    DF AUTOMATED     METABOLIC PANEL, COMPREHENSIVE    Collection Time: 10/23/21  1:25 PM   Result Value Ref Range    Sodium 127 (L) 136 - 145 mmol/L    Potassium 3.9 3.5 - 5.1 mmol/L    Chloride 90 (L) 97 - 108 mmol/L    CO2 29 21 - 32 mmol/L    Anion gap 8 5 - 15 mmol/L    Glucose 106 (H) 65 - 100 mg/dL    BUN 5 (L) 6 - 20 mg/dL    Creatinine 0.61 0.55 - 1.02 mg/dL    BUN/Creatinine ratio 8 (L) 12 - 20      GFR est AA >60 >60 ml/min/1.73m2    GFR est non-AA >60 >60 ml/min/1.73m2    Calcium 8.4 (L) 8.5 - 10.1 mg/dL    Bilirubin, total 0.4 0.2 - 1.0 mg/dL    AST (SGOT) 26 15 - 37 U/L    ALT (SGPT) 15 12 - 78 U/L    Alk. phosphatase 107 45 - 117 U/L    Protein, total 5.9 (L) 6.4 - 8.2 g/dL    Albumin 1.8 (L) 3.5 - 5.0 g/dL    Globulin 4.1 (H) 2.0 - 4.0 g/dL    A-G Ratio 0.4 (L) 1.1 - 2.2     TROPONIN-HIGH SENSITIVITY    Collection Time: 10/23/21  1:25 PM   Result Value Ref Range    Troponin-High Sensitivity 22 0 - 51 ng/L   NT-PRO BNP    Collection Time: 10/23/21  1:25 PM   Result Value Ref Range    NT pro-BNP 3,099 (H) <125 pg/mL   LACTIC ACID    Collection Time: 10/23/21  1:25 PM   Result Value Ref Range    Lactic acid 1.0 0.4 - 2.0 mmol/L   TSH 3RD GENERATION    Collection Time: 10/23/21  1:25 PM   Result Value Ref Range    TSH 46.90 (H) 0.36 - 3.74 uIU/mL   COVID-19 RAPID TEST    Collection Time: 10/23/21  1:45 PM   Result Value Ref Range    Specimen source Nasopharyngeal      COVID-19 rapid test Not Detected Not Detected     TROPONIN-HIGH SENSITIVITY    Collection Time: 10/23/21  4:00 PM   Result Value Ref Range    Troponin-High Sensitivity 19 0 - 51 ng/L         Assessment and plan:         (1) Acute encephalopathy : GCS 12 likely opiate overdose accidental. Improved after narcan.will resume      (2) epidural abscess: s/p drainage on 9/15.  daptomycin and rifampin for 4 more days     (2) CHFpEF: lasix 40 daily      (3) RA: prednisone daily      (4) fibromyalgia: complicates all the above. Will resume duloxetine, gabapentin and PRN flexiril. 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     DVT ppx: Lovenox     DISPO: plan for dc tomorrow, have PT today to see if they recommend     Signed By: Franco Albert MD     October 24, 2021

## 2021-10-25 LAB
ANION GAP SERPL CALC-SCNC: 9 MMOL/L (ref 5–15)
BASOPHILS # BLD: 0.1 K/UL (ref 0–0.1)
BASOPHILS NFR BLD: 1 % (ref 0–1)
BUN SERPL-MCNC: 3 MG/DL (ref 6–20)
BUN/CREAT SERPL: 8 (ref 12–20)
CA-I BLD-MCNC: 8.6 MG/DL (ref 8.5–10.1)
CHLORIDE SERPL-SCNC: 92 MMOL/L (ref 97–108)
CO2 SERPL-SCNC: 28 MMOL/L (ref 21–32)
CREAT SERPL-MCNC: 0.36 MG/DL (ref 0.55–1.02)
DIFFERENTIAL METHOD BLD: ABNORMAL
EOSINOPHIL # BLD: 0.5 K/UL (ref 0–0.4)
EOSINOPHIL NFR BLD: 5 % (ref 0–7)
ERYTHROCYTE [DISTWIDTH] IN BLOOD BY AUTOMATED COUNT: 15.7 % (ref 11.5–14.5)
GLUCOSE SERPL-MCNC: 116 MG/DL (ref 65–100)
HCT VFR BLD AUTO: 25 % (ref 35–47)
HGB BLD-MCNC: 7.9 G/DL (ref 11.5–16)
IMM GRANULOCYTES # BLD AUTO: 0.1 K/UL (ref 0–0.04)
IMM GRANULOCYTES NFR BLD AUTO: 1 % (ref 0–0.5)
LYMPHOCYTES # BLD: 1.7 K/UL (ref 0.8–3.5)
LYMPHOCYTES NFR BLD: 20 % (ref 12–49)
MCH RBC QN AUTO: 27.1 PG (ref 26–34)
MCHC RBC AUTO-ENTMCNC: 31.6 G/DL (ref 30–36.5)
MCV RBC AUTO: 85.9 FL (ref 80–99)
MONOCYTES # BLD: 0.8 K/UL (ref 0–1)
MONOCYTES NFR BLD: 10 % (ref 5–13)
NEUTS SEG # BLD: 5.2 K/UL (ref 1.8–8)
NEUTS SEG NFR BLD: 63 % (ref 32–75)
NRBC # BLD: 0 K/UL (ref 0–0.01)
NRBC BLD-RTO: 0 PER 100 WBC
PLATELET # BLD AUTO: 632 K/UL (ref 150–400)
PMV BLD AUTO: 9.1 FL (ref 8.9–12.9)
POTASSIUM SERPL-SCNC: 3 MMOL/L (ref 3.5–5.1)
RBC # BLD AUTO: 2.91 M/UL (ref 3.8–5.2)
SODIUM SERPL-SCNC: 129 MMOL/L (ref 136–145)
WBC # BLD AUTO: 8.4 K/UL (ref 3.6–11)

## 2021-10-25 PROCEDURE — 84439 ASSAY OF FREE THYROXINE: CPT

## 2021-10-25 PROCEDURE — 97530 THERAPEUTIC ACTIVITIES: CPT

## 2021-10-25 PROCEDURE — 74011636637 HC RX REV CODE- 636/637: Performed by: HOSPITALIST

## 2021-10-25 PROCEDURE — 74011000258 HC RX REV CODE- 258: Performed by: HOSPITALIST

## 2021-10-25 PROCEDURE — 36415 COLL VENOUS BLD VENIPUNCTURE: CPT

## 2021-10-25 PROCEDURE — 80048 BASIC METABOLIC PNL TOTAL CA: CPT

## 2021-10-25 PROCEDURE — 85025 COMPLETE CBC W/AUTO DIFF WBC: CPT

## 2021-10-25 PROCEDURE — 97161 PT EVAL LOW COMPLEX 20 MIN: CPT

## 2021-10-25 PROCEDURE — 65270000029 HC RM PRIVATE

## 2021-10-25 PROCEDURE — 74011250636 HC RX REV CODE- 250/636: Performed by: HOSPITALIST

## 2021-10-25 PROCEDURE — 97165 OT EVAL LOW COMPLEX 30 MIN: CPT

## 2021-10-25 PROCEDURE — 74011250637 HC RX REV CODE- 250/637: Performed by: HOSPITALIST

## 2021-10-25 RX ADMIN — LEVOTHYROXINE SODIUM 150 MCG: 0.07 TABLET ORAL at 09:31

## 2021-10-25 RX ADMIN — Medication 10 ML: at 17:10

## 2021-10-25 RX ADMIN — GABAPENTIN 400 MG: 400 CAPSULE ORAL at 17:07

## 2021-10-25 RX ADMIN — CYCLOBENZAPRINE 10 MG: 10 TABLET, FILM COATED ORAL at 09:31

## 2021-10-25 RX ADMIN — CEVIMELINE 30 MG: 30 CAPSULE ORAL at 17:09

## 2021-10-25 RX ADMIN — CYCLOBENZAPRINE 10 MG: 10 TABLET, FILM COATED ORAL at 17:07

## 2021-10-25 RX ADMIN — ESCITALOPRAM OXALATE 20 MG: 10 TABLET ORAL at 09:31

## 2021-10-25 RX ADMIN — HYDROMORPHONE HYDROCHLORIDE 2 MG: 2 TABLET ORAL at 17:07

## 2021-10-25 RX ADMIN — CEVIMELINE 30 MG: 30 CAPSULE ORAL at 21:49

## 2021-10-25 RX ADMIN — AMITRIPTYLINE HYDROCHLORIDE 25 MG: 25 TABLET, FILM COATED ORAL at 21:48

## 2021-10-25 RX ADMIN — SODIUM CHLORIDE 500 ML: 9 INJECTION, SOLUTION INTRAVENOUS at 17:13

## 2021-10-25 RX ADMIN — GABAPENTIN 400 MG: 400 CAPSULE ORAL at 21:48

## 2021-10-25 RX ADMIN — DAPTOMYCIN 650 MG: 500 INJECTION, POWDER, LYOPHILIZED, FOR SOLUTION INTRAVENOUS at 18:50

## 2021-10-25 RX ADMIN — CEVIMELINE 30 MG: 30 CAPSULE ORAL at 09:31

## 2021-10-25 RX ADMIN — HYDROMORPHONE HYDROCHLORIDE 2 MG: 2 TABLET ORAL at 09:31

## 2021-10-25 RX ADMIN — PANTOPRAZOLE SODIUM 40 MG: 40 TABLET, DELAYED RELEASE ORAL at 09:30

## 2021-10-25 RX ADMIN — DULOXETINE 60 MG: 30 CAPSULE, DELAYED RELEASE ORAL at 21:48

## 2021-10-25 RX ADMIN — PREDNISONE 5 MG: 5 TABLET ORAL at 09:30

## 2021-10-25 RX ADMIN — ACETAMINOPHEN 650 MG: 325 TABLET ORAL at 12:45

## 2021-10-25 RX ADMIN — GABAPENTIN 400 MG: 400 CAPSULE ORAL at 09:30

## 2021-10-25 RX ADMIN — ENOXAPARIN SODIUM 40 MG: 40 INJECTION SUBCUTANEOUS at 09:31

## 2021-10-25 RX ADMIN — HYDROMORPHONE HYDROCHLORIDE 2 MG: 2 TABLET ORAL at 23:04

## 2021-10-25 NOTE — PROGRESS NOTES
Problem: Mobility Impaired (Adult and Pediatric)  Goal: *Acute Goals and Plan of Care (Insert Text)  Description: Patient will move from supine to sit and sit to supine , scoot up and down, and roll side to side in bed with moderate assistance  within 7 day(s). Patient will transfer from bed to chair and chair to bed with moderate assistance  using the least restrictive device within 7 day(s). Patient will improve static sitting balance to moderate assistance within 1 week(s). Outcome: Progressing Towards Goal   PHYSICAL THERAPY EVALUATION  Patient: Josh Cantrell (73 y.o. female)  Date: 10/25/2021  Primary Diagnosis: Acute encephalopathy [G93.40]        Precautions:fall, recent back SX    ASSESSMENT  Pt is a 74 y/o F with PMH significant for HTN, sarcoidosis, sjogrens syndrome, fibromylgia, depression, COPD not on oxygen and CHFpEF not on lasix RA, recent UTI, thoracic spinal stenosis s/p recent laminectomy (per pt, Aug  27th 2021) presents to [facility] ED from SNF with cc of AMS. According to reports, patient was at her baseline until given oxycodone at her living facility. Pt then reportedly went unresponsive. EMS gave patient 4 mg Narcan prior to arrival with improvement of patient's mentation, however with persistent confusion. Upon arrival to the ED, patient noted to be minimally responsive to pain with pinpoint pupils and additional 2 mg of Narcan administered. Patient again with improvement in her mentation. Patient required an additional dose of Narcan after she became unresponsive a third time. Of note, pt recently dc from another hospital 7/75/69 with complicated hospital stay where she presented with septic shock s/p epidural abscess s/p drainage that grew MRSA, she has a PICC line and was on daptomycin and rifampin for 6 weeks. Pt currently admitted to [facility] 10/23/21 and being treated for acute encephalopathy. CT head (-) for IC bleed.       Pt received semi-supine in bed upon arrival and agreeable to PT and OT evaluations at this time with encouragement. Per pt report, pt resided with spouse at baseline, however admitted from SNF where she had been staying since last hospital admission. Pt reports she required assist for ADLs, was able to transfer into a w/c and commode with assist, however reports she stayed mostly in the bed PTA. Based on current observations, pt presents with deficits in generalized strength/AROM, bed mobility, functional activity tolerance, and increased pain impacting overall performance  functional transfers/mobility. Pt currently requires mod-max A rolling to each side via log rolling technique , mod A x2 scooting up to [facility] today. Patient unable to tolerate few degree up and down of the [facility] for bed mob. C/o pain in back with HOB even 5-10 degree up. Unable to tolerate flat hob. Overall, pt tolerates session fair today, limited mostly my reports of increased back pain with ADLs/mobility and unable to progress to EOB or OOB transfer on this date. Pt would benefit from continued skilled PT services to address current impairments and improve overall IND and safety with self cares and functional transfers/mobility. Recommend discharge to SNF once medically appropriate. Patient appear to have low motivation and low endurance to activity. Other factors to consider for discharge: family support, DME, time since onset, severity of deficits      Patient will benefit from skilled therapy intervention to address the above noted impairments. PLAN :  Recommendations and Planned Interventions: bed mobility training, transfer training, therapeutic exercises, patient and family training/education, and therapeutic activities      Frequency/Duration: Patient will be followed by physical therapy:  2-3x/week to address goals.     Recommendation for discharge: (in order for the patient to meet his/her long term goals)  Remington Veliz    This discharge recommendation:  Has been made in collaboration with the attending provider and/or case management    IF patient discharges home will need the following DME: to be determined (TBD)         SUBJECTIVE:   Patient stated i can't do any of these things today.     OBJECTIVE DATA SUMMARY:   HISTORY:    Past Medical History:   Diagnosis Date    Adverse effect of anesthesia     Arthritis     joint pain    Back pain     Balance problem     Bruises easily     Bursitis     Cataract     Chronic bronchitis (HCC)     Chronic fatigue syndrome     Chronic obstructive pulmonary disease ([address]     COVID-19 virus infection 02/2021    Edema of both legs     Esophageal reflux     Falls frequently 02/2018    6 times in the last 4 months    Fibroids     Fibromyalgia     GERD (gastroesophageal reflux disease)     Hiatal hernia     Hiatal hernia     History of peptic ulcer     Hypertension     IBS (irritable bowel syndrome)     Ill-defined condition     Irregular heart beat     Migraines     Neck pain     Obesity     Osteoporosis     Psychiatric disorder     DEPRESSION    PUD (peptic ulcer disease)     PVD (peripheral vascular disease) ([facility])     RA (rheumatoid arthritis) ([facility])     Right leg numbness     Sarcoidosis     brain    Sarcoidosis of lung ([facility] 2011    Short-term memory loss     SOB (shortness of breath)     Thyroid disease     hypothyroid    Tremors of nervous system     Urinary incontinence      Past Surgical History:   Procedure Laterality Date    HX APPENDECTOMY  1968    HX BREAST LUMPECTOMY      X 6    HX CATARACT REMOVAL Bilateral 08/2014    HX CERVICAL FUSION  03/2017    HX CHOLECYSTECTOMY  1995    HX GASTRIC BYPASS  1978    HX HYSTERECTOMY  1983    HX LUMBAR FUSION  12/2012    HX ORTHOPAEDIC  2012    L1-L5    HX TONSILLECTOMY  2012    PICC INSERTION VASCULAR ACCESS TEAM  9/20/2021            Personal factors and/or comorbidities impacting plan of care:   Home Situation  Home Environment: [address] [place] [address] [facility]  Support Systems: Spouse/Significant Other  Patient Expects to be Discharged to[de-identified] Skilled nursing facility    EXAMINATION/PRESENTATION/DECISION MAKING:   Critical Behavior:  Neurologic State: Alert  Orientation Level: Oriented to person, Oriented to place  Cognition: Follows commands     Hearing: Auditory  Auditory Impairment: None  Range Of Motion:  AROM: Generally decreased, functional                       Strength:    Strength: Generally decreased, functional                                                Coordination:  Coordination: Within functional limits  Vision:      Functional Mobility:  Bed Mobility:  Rolling: Moderate assistance;Maximum assistance        Scooting: Moderate assistance;Maximum assistance;Assist x2                                       Functional Measure:  [address] [place] [address] AM-PAC 6 Clicks         Basic Mobility Inpatient Short Form  How much difficulty does the patient currently have. .. Unable A Lot A Little None   1. Turning over in bed (including adjusting bedclothes, sheets and blankets)? [] 1   [x] 2   [] 3   [] 4   2. Sitting down on and standing up from a chair with arms ( e.g., wheelchair, bedside commode, etc.)   [x] 1   [] 2   [] 3   [] 4   3. Moving from lying on back to sitting on the side of the bed? [x] 1   [] 2   [] 3   [] 4          How much help from another person does the patient currently need. .. Total A Lot A Little None   4. Moving to and from a bed to a chair (including a wheelchair)? [x] 1   [] 2   [] 3   [] 4   5. Need to walk in hospital room? [x] 1   [] 2   [] 3   [] 4   6. Climbing 3-5 steps with a railing? [x] 1   [] 2   [] 3   [] 4   © 2007, Trustees of [address] [place] [address], under license to [employer]. All rights reserved     Score:  Initial: 7/24 Most Recent: X (Date:10/25/2021 )   Interpretation of Tool:  Represents activities that are increasingly more difficult (i.e. Bed mobility, Transfers, Gait).   Score 24 23 22-20 19-15 14-10 9-7 6   Modifier CH CI CJ CK CL CM CN           Physical Therapy Evaluation Charge Determination   History Examination Presentation Decision-Making   LOW Complexity : Zero comorbidities / personal factors that will impact the outcome / POC LOW Complexity : 1-2 Standardized tests and measures addressing body structure, function, activity limitation and / or participation in recreation  LOW Complexity : Stable, uncomplicated  LOW Complexity : FOTO score of       Based on the above components, the patient evaluation is determined to be of the following complexity level: LOW     Pain Ratin/10    Activity Tolerance:   Fair and Poor  Please refer to the flowsheet for vital signs taken during this treatment. After treatment patient left in no apparent distress:   Supine in bed and Call bell within reach    COMMUNICATION/EDUCATION:   The patients plan of care was discussed with: Occupational therapist, Registered nurse, and Case management. Fall prevention education was provided and the patient/caregiver indicated understanding., Patient/family have participated as able in goal setting and plan of care. , and Patient/family agree to work toward stated goals and plan of care.     Thank you for this referral.  Fakrysten Degree PT   Time Calculation: 21 mins

## 2021-10-25 NOTE — PROGRESS NOTES
We will keep her today    So she is done with the antibiotics and wont need it at the rehab    Her Na is 129, I think it is likely because of possible hypothyroidism. Pending T4. She is on thyroxine, it is a higher dose, she needs endo F/u. I will still give her a bolus and repeat BMP in AM    Clearly that's a high dose, we need to make sure that she has a three hour difference between protonix and multivitamins and levothyroxine. There is no point in increasing the dose until this is addressed. This might very welll be the reason for low sodium.  Repeat BMP in Am    She should be stable to go to SNF today

## 2021-10-25 NOTE — PROGRESS NOTES
RUY spoke to liaison at [facility] [place]. She is checking with the NP at her facility to see if patient can return today. CM awaiting a call back from  Liaison, Radhames Rowe.

## 2021-10-25 NOTE — PROGRESS NOTES
OCCUPATIONAL THERAPY EVALUATION  Patient: Pb Harp (73 y.o. female)  Date: 10/25/2021  Primary Diagnosis: Acute encephalopathy [G93.40]        Precautions: fall risk, spinal precautions       ASSESSMENT  Pt is a 72 y/o F with PMH significant for HTN, sarcoidosis, sjogrens syndrome, fibromylgia, depression, COPD not on oxygen and CHFpEF not on lasix RA, recent UTI, thoracic spinal stenosis s/p recent laminectomy (per pt, Aug 2021) presents to [facility] ED from SNF with cc of AMS. According to reports, patient was at her baseline until given oxycodone at her living facility. Pt then reportedly went unresponsive. EMS gave patient 4 mg Narcan prior to arrival with improvement of patient's mentation, however with persistent confusion. Upon arrival to the ED, patient noted to be minimally responsive to pain with pinpoint pupils and additional 2 mg of Narcan administered. Patient again with improvement in her mentation. Patient required an additional dose of Narcan after she became unresponsive a third time. Of note, pt recently dc from another hospital 2/88/12 with complicated hospital stay where she presented with septic shock s/p epidural abscess s/p drainage that grew MRSA, she has a PICC line and was on daptomycin and rifampin for 6 weeks. Pt currently admitted to [facility] 10/23/21 and being treated for acute encephalopathy. CT head (-) for IC bleed. Pt received semi-supine in bed upon arrival and agreeable to OT and PT evaluations at this time with encouragement. Per pt report, pt resided with spouse at baseline, however admitted from [facility] where she had been staying since last hospital admission. Pt reports she required assist for ADLs, was able to transfer into a w/c and commode with assist, however reports she stayed mostly in the bed PTA.  Based on current observations, pt presents with deficits in generalized strength/AROM, bed mobility, functional activity tolerance, and increased pain impacting overall performance of ADLs and functional transfers/mobility. Pt currently requires mod-max A rolling to each side via log rolling technique (simulated total A toileting), mod A x2 scooting up to [facility] today, simulated total A LB dressing/socks and IND opening breakfast containers/self feeding with HOB elevated. Overall, pt tolerates session fair today, limited mostly my reports of increased back pain with ADLs/mobility and unable to progress to EOB ADL or OOB transfer on this date. Pt would benefit from continued skilled OT services to address current impairments and improve overall IND and safety with self cares and functional transfers/mobility. Recommend discharge to SNF once medically appropriate. Other factors to consider for discharge: family support, DME, time since onset, severity of deficits      Patient will benefit from skilled therapy intervention to address the above noted impairments. PLAN :  Recommendations and Planned Interventions: self care training, functional mobility training, therapeutic exercise, balance training, therapeutic activities, endurance activities, patient education, and family training/education    Frequency/Duration: Patient will be followed by physical therapy:  2-3x/week to address goals.     Recommendation for discharge: (in order for the patient to meet his/her long term goals)  Remington Veliz    This discharge recommendation:  Has been made in collaboration with the attending provider and/or case management       SUBJECTIVE:   Patient stated my back hurts    OBJECTIVE DATA SUMMARY:   HISTORY:   Past Medical History:   Diagnosis Date    Adverse effect of anesthesia     Arthritis     joint pain    Back pain     Balance problem     Bruises easily     Bursitis     Cataract     Chronic bronchitis (HCC)     Chronic fatigue syndrome     Chronic obstructive pulmonary disease ([facility] [address] [address]     COVID-19 virus infection 02/2021    Edema of both legs     Esophageal reflux     Falls frequently 02/2018    6 times in the last 4 months    Fibroids     Fibromyalgia     GERD (gastroesophageal reflux disease)     Hiatal hernia     Hiatal hernia     History of peptic ulcer     Hypertension     IBS (irritable bowel syndrome)     Ill-defined condition     Irregular heart beat     Migraines     Neck pain     Obesity     Osteoporosis     Psychiatric disorder     DEPRESSION    PUD (peptic ulcer disease)     PVD (peripheral vascular disease) (HCC)     RA (rheumatoid arthritis) (HCC)     Right leg numbness     Sarcoidosis     brain    Sarcoidosis of lung ([facility] [address] 2011    Short-term memory loss     SOB (shortness of breath)     Thyroid disease     hypothyroid    Tremors of nervous system     Urinary incontinence      Past Surgical History:   Procedure Laterality Date    HX APPENDECTOMY  1968    HX BREAST LUMPECTOMY      X 6    HX CATARACT REMOVAL Bilateral 08/2014    HX CERVICAL FUSION  03/2017    HX CHOLECYSTECTOMY  1995    HX GASTRIC BYPASS  1978    HX HYSTERECTOMY  1983    HX LUMBAR FUSION  12/2012    HX ORTHOPAEDIC  2012    L1-L5    HX TONSILLECTOMY  2012    PICC INSERTION VASCULAR ACCESS TEAM  9/20/2021            Expanded or extensive additional review of patient history:     Home Situation  Home Environment: [address] [place] [address] Name: [facility]: Spouse/Significant Other  Patient Expects to be Discharged to[de-identified] Skilled nursing facility    EXAMINATION OF PERFORMANCE DEFICITS:  Cognitive/Behavioral Status:  Neurologic State: Alert  Orientation Level: Oriented to person;Oriented to place  Cognition: Follows commands    Skin: stiches noted along back from recent surgery, otherwise skin appears intact where visible    Hearing:   Auditory  Auditory Impairment: None    Range of Motion:  AROM: Generally decreased, functional    Strength:  Strength: Generally decreased, functional (Grossly 3+ to 4-/5)    Coordination:  Coordination: Within functional limits  Fine Motor Skills-Upper: Left Intact; Right Intact    Gross Motor Skills-Upper: Left Intact; Right Intact    Functional Mobility and Transfers for ADLs:  Bed Mobility:  Rolling: Moderate assistance;Maximum assistance  Scooting: Moderate assistance;Assist x2 (up to [facility])    ADL Intervention and task modifications:  Feeding  Feeding Assistance: Independent  Container Management: Independent  Drink to Mouth: Independent    Lower Body Dressing Assistance  Socks: Total assistance (dependent) (Simulated)  Position Performed: Long sitting on bed    Therapeutic Exercise:  Pt would benefit from UE HEP to improve overall UE AROM/strength and can be further educated in next treatment session. Functional Measure:    [address] [place] [address] AM-PACTM \"6 Clicks\"                                                       Daily Activity Inpatient Short Form  How much help from another person does the patient currently need. .. Total; A Lot A Little None   1. Putting on and taking off regular lower body clothing? [x]  1 []  2 []  3 []  4   2. Bathing (including washing, rinsing, drying)? []  1 [x]  2 []  3 []  4   3. Toileting, which includes using toilet, bedpan or urinal? [x] 1 []  2 []  3 []  4   4. Putting on and taking off regular upper body clothing? []  1 [x]  2 []  3 []  4   5. Taking care of personal grooming such as brushing teeth? []  1 []  2 [x]  3 []  4   6. Eating meals? []  1 []  2 []  3 [x]  4   © 2007, Trustees of [address] [place] [address], under license to [employer]. All rights reserved     Score: 13/24     Interpretation of Tool:  Represents clinically-significant functional categories (i.e. Activities of daily living).   Percentage of Impairment CH    0%   CI    1-19% CJ    20-39% CK    40-59% CL    60-79% CM    80-99% CN     100%   AMPAC  Score 6-24 24 23 20-22 15-19 10-14 7-9 6        Occupational Therapy Evaluation Charge Determination   History Examination Decision-Making   LOW Complexity : Brief history review  MEDIUM Complexity : 3-5 performance deficits relating to physical, cognitive , or psychosocial skils that result in activity limitations and / or participation restrictions MEDIUM Complexity : Patient may present with comorbidities that affect occupational performnce. Miniml to moderate modification of tasks or assistance (eg, physical or verbal ) with assesment(s) is necessary to enable patient to complete evaluation       Based on the above components, the patient evaluation is determined to be of the following complexity level: LOW   Pain Ratin/10 back    Activity Tolerance:   Fair    After treatment patient left in no apparent distress:    Supine in bed, Call bell within reach, Bed / chair alarm activated, and Side rails x 3    COMMUNICATION/EDUCATION:   The patients plan of care was discussed with: Physical therapist and Registered nurse. Patient/family have participated as able in goal setting and plan of care. and Patient/family agree to work toward stated goals and plan of care. This patients plan of care is appropriate for delegation to [facility] [place].     OT/PT sessions occurred together for increased patient and clinician safety as pt requires A of 2 for mobility at this time    Thank you for this referral.  Gayla Muniz  Time Calculation: 18 mins   Problem: Self Care Deficits Care Plan (Adult)  Goal: *Acute Goals and Plan of Care (Insert Text)  Description: Pt will be Mod I sup <> sit in prep for EOB ADLs  Pt will be Mod I grooming sitting EOB  Pt will be Mod I LE dressing sitting EOB/long sit  Pt will be Mod I sit <>  prep for toileting LRAD  Pt will be Mod I toileting/toilet transfer/cloth mgmt LRAD  Pt will be IND following UE HEP in prep for self care tasks   Outcome: Not Met

## 2021-10-25 NOTE — PROGRESS NOTES
CM spoke to liaison at [facility]. The physician there is requesting to speak to primary physician here. CM provided information to primary physician. Physician has called and left a message around 1400. Awaiting call back to confirm whether patient can discharge.

## 2021-10-26 LAB
ANION GAP SERPL CALC-SCNC: 7 MMOL/L (ref 5–15)
BUN SERPL-MCNC: 3 MG/DL (ref 6–20)
BUN/CREAT SERPL: 8 (ref 12–20)
CA-I BLD-MCNC: 8.4 MG/DL (ref 8.5–10.1)
CHLORIDE SERPL-SCNC: 91 MMOL/L (ref 97–108)
CO2 SERPL-SCNC: 29 MMOL/L (ref 21–32)
CREAT SERPL-MCNC: 0.37 MG/DL (ref 0.55–1.02)
GLUCOSE SERPL-MCNC: 92 MG/DL (ref 65–100)
POTASSIUM SERPL-SCNC: 3.2 MMOL/L (ref 3.5–5.1)
SODIUM SERPL-SCNC: 127 MMOL/L (ref 136–145)
T4 FREE SERPL-MCNC: 1.1 NG/DL (ref 0.8–1.5)

## 2021-10-26 PROCEDURE — 74011250637 HC RX REV CODE- 250/637: Performed by: HOSPITALIST

## 2021-10-26 PROCEDURE — 74011250636 HC RX REV CODE- 250/636: Performed by: HOSPITALIST

## 2021-10-26 PROCEDURE — 36415 COLL VENOUS BLD VENIPUNCTURE: CPT

## 2021-10-26 PROCEDURE — 97530 THERAPEUTIC ACTIVITIES: CPT

## 2021-10-26 PROCEDURE — 74011636637 HC RX REV CODE- 636/637: Performed by: HOSPITALIST

## 2021-10-26 PROCEDURE — 65270000029 HC RM PRIVATE

## 2021-10-26 PROCEDURE — 80048 BASIC METABOLIC PNL TOTAL CA: CPT

## 2021-10-26 PROCEDURE — 74011000258 HC RX REV CODE- 258: Performed by: HOSPITALIST

## 2021-10-26 PROCEDURE — 74011250637 HC RX REV CODE- 250/637: Performed by: INTERNAL MEDICINE

## 2021-10-26 RX ORDER — SODIUM CHLORIDE TAB 1 GM 1 G
1 TAB MISCELLANEOUS
Status: DISCONTINUED | OUTPATIENT
Start: 2021-10-26 | End: 2021-10-27 | Stop reason: HOSPADM

## 2021-10-26 RX ADMIN — CEVIMELINE 30 MG: 30 CAPSULE ORAL at 22:05

## 2021-10-26 RX ADMIN — LEVOTHYROXINE SODIUM 150 MCG: 0.07 TABLET ORAL at 08:45

## 2021-10-26 RX ADMIN — CEVIMELINE 30 MG: 30 CAPSULE ORAL at 16:43

## 2021-10-26 RX ADMIN — DULOXETINE 60 MG: 30 CAPSULE, DELAYED RELEASE ORAL at 21:47

## 2021-10-26 RX ADMIN — ACETAMINOPHEN 650 MG: 325 TABLET ORAL at 08:45

## 2021-10-26 RX ADMIN — HYDROMORPHONE HYDROCHLORIDE 2 MG: 2 TABLET ORAL at 12:21

## 2021-10-26 RX ADMIN — GABAPENTIN 400 MG: 400 CAPSULE ORAL at 16:43

## 2021-10-26 RX ADMIN — HYDROMORPHONE HYDROCHLORIDE 2 MG: 2 TABLET ORAL at 18:35

## 2021-10-26 RX ADMIN — Medication 1 G: at 13:46

## 2021-10-26 RX ADMIN — PANTOPRAZOLE SODIUM 40 MG: 40 TABLET, DELAYED RELEASE ORAL at 08:45

## 2021-10-26 RX ADMIN — Medication 1 G: at 16:43

## 2021-10-26 RX ADMIN — CYCLOBENZAPRINE 10 MG: 10 TABLET, FILM COATED ORAL at 08:46

## 2021-10-26 RX ADMIN — CEVIMELINE 30 MG: 30 CAPSULE ORAL at 08:46

## 2021-10-26 RX ADMIN — ENOXAPARIN SODIUM 40 MG: 40 INJECTION SUBCUTANEOUS at 08:45

## 2021-10-26 RX ADMIN — PREDNISONE 5 MG: 5 TABLET ORAL at 08:45

## 2021-10-26 RX ADMIN — GABAPENTIN 400 MG: 400 CAPSULE ORAL at 08:45

## 2021-10-26 RX ADMIN — ESCITALOPRAM OXALATE 20 MG: 10 TABLET ORAL at 08:45

## 2021-10-26 RX ADMIN — CYCLOBENZAPRINE 10 MG: 10 TABLET, FILM COATED ORAL at 01:07

## 2021-10-26 RX ADMIN — DAPTOMYCIN 650 MG: 500 INJECTION, POWDER, LYOPHILIZED, FOR SOLUTION INTRAVENOUS at 17:55

## 2021-10-26 RX ADMIN — RIFAMPIN 600 MG: 300 CAPSULE ORAL at 12:21

## 2021-10-26 RX ADMIN — AMITRIPTYLINE HYDROCHLORIDE 25 MG: 25 TABLET, FILM COATED ORAL at 21:47

## 2021-10-26 RX ADMIN — GABAPENTIN 400 MG: 400 CAPSULE ORAL at 21:47

## 2021-10-26 RX ADMIN — HYDROMORPHONE HYDROCHLORIDE 2 MG: 2 TABLET ORAL at 05:02

## 2021-10-26 NOTE — PROGRESS NOTES
Chart reviewed. Patient has been accepted to [facility] pending medical clearance. Per attending patient will remain in the hospital another 24 hours pending improvement of sodium. CM will continue to follow.

## 2021-10-26 NOTE — PROGRESS NOTES
Hospitalist Progress Note               Daily Progress Note: 10/26/2021      Subjective:   Hospital course to date: She is a 73-year-old female with a history of rheumatoid arthritis on chronic steroids, COPD, Sjogren's syndrome and diastolic heart failure who was discharged from another facility in mid September after a complicated hospital stay for septic shock and epidural abscess with drainage that grew MRSA. She was discharged with a PICC line for IV daptomycin and rifampin for 6 weeks since she apparently had an inadequate response to vancomycin. She was admitted with unresponsiveness from the nursing facility and responded to Narcan. She was initially on 6 L nasal cannula. CT of the head was negative. Her sodium was 127 on admission and has been essentially unchanged since then. There was some concern that this may be related to undertreated hypothyroidism due to an elevated TSH of 46. In September patient had a normal TSH of 2. It is possible that she may not have been absorbing Synthroid due to concomitant administration of other meds with her Synthroid    No new complaints today.     Problem List:  Problem List as of 10/26/2021 Date Reviewed: 9/9/2021        Codes Class Noted - Resolved    Acute encephalopathy ICD-10-CM: G93.40  ICD-9-CM: 348.30  10/23/2021 - Present        Sepsis ([facility] [address] ICD-10-CM: A41.9  ICD-9-CM: 038.9, 995.91  9/9/2021 - Present        Thoracic stenosis ICD-10-CM: M48.04  ICD-9-CM: 724.01  8/27/2021 - Present        HNP (herniated nucleus pulposus), lumbar ICD-10-CM: M51.26  ICD-9-CM: 722.10  3/16/2018 - Present              Medications reviewed  Current Facility-Administered Medications   Medication Dose Route Frequency    amitriptyline (ELAVIL) tablet 25 mg  25 mg Oral QHS    cyclobenzaprine (FLEXERIL) tablet 10 mg  10 mg Oral TID PRN    DULoxetine (CYMBALTA) capsule 60 mg  60 mg Oral QHS    escitalopram oxalate (LEXAPRO) tablet 20 mg  20 mg Oral DAILY    gabapentin (NEURONTIN) capsule 400 mg  400 mg Oral TID    HYDROmorphone (DILAUDID) tablet 2 mg  2 mg Oral Q6H PRN    cevimeline (EVOXAC) capsule 30 mg  30 mg Oral TID    levothyroxine (SYNTHROID) tablet 150 mcg  150 mcg Oral DAILY    pantoprazole (PROTONIX) tablet 40 mg  40 mg Oral DAILY    predniSONE (DELTASONE) tablet 5 mg  5 mg Oral DAILY    sodium chloride (NS) flush 5-40 mL  5-40 mL IntraVENous PRN    acetaminophen (TYLENOL) tablet 650 mg  650 mg Oral Q6H PRN    Or    acetaminophen (TYLENOL) suppository 650 mg  650 mg Rectal Q6H PRN    polyethylene glycol (MIRALAX) packet 17 g  17 g Oral DAILY PRN    ondansetron (ZOFRAN ODT) tablet 4 mg  4 mg Oral Q8H PRN    Or    ondansetron (ZOFRAN) injection 4 mg  4 mg IntraVENous Q6H PRN    enoxaparin (LOVENOX) injection 40 mg  40 mg SubCUTAneous DAILY    DAPTOmycin (CUBICIN) 650 mg in 0.9% sodium chloride 50 mL IVPB  6 mg/kg IntraVENous Q24H    rifAMPin (RIFADIN) capsule 600 mg  600 mg Oral ACB       Review of Systems:   A comprehensive review of systems was negative except for that written in the HPI. Objective:   Physical Exam:     Visit Vitals  /69 (BP 1 Location: Left upper arm, BP Patient Position: Lying;Supine)   Pulse 86   Temp 97.6 °F (36.4 °C)   Resp 18   Wt 112.2 kg (247 lb 5.7 oz)   SpO2 91%   BMI 41.16 kg/m²    O2 Flow Rate (L/min): 15 l/min O2 Device: None (Room air)    Temp (24hrs), Av.1 °F (36.7 °C), Min:97.6 °F (36.4 °C), Max:98.5 °F (36.9 °C)    No intake/output data recorded. 10/24 1901 - 10/26 0700  In: 1000 [P.O.:1000]  Out: 1100 [Urine:1100]    General:   Awake and alert   Lungs:   Clear to auscultation bilaterally. Chest wall:  No tenderness or deformity. Heart:  Regular rate and rhythm, S1, S2 normal, no murmur, click, rub or gallop. Abdomen:   Soft, non-tender. Bowel sounds normal. No masses,  No organomegaly. Extremities: Extremities normal, atraumatic, no cyanosis or edema.    Pulses: 2+ and symmetric all extremities. Skin: Skin color, texture, turgor normal. No rashes or lesions   Neurologic: CNII-XII intact. No gross focal deficits         Data Review:       Recent Days:  Recent Labs     10/25/21  1102 10/23/21  1325   WBC 8.4 13.2*   HGB 7.9* 8.2*   HCT 25.0* 25.4*   * 745*     Recent Labs     10/26/21  0636 10/25/21  1102 10/23/21  1325   * 129* 127*   K 3.2* 3.0* 3.9   CL 91* 92* 90*   CO2 29 28 29   GLU 92 116* 106*   BUN 3* 3* 5*   CREA 0.37* 0.36* 0.61   CA 8.4* 8.6 8.4*   ALB  --   --  1.8*   TBILI  --   --  0.4   ALT  --   --  15     No results for input(s): PH, PCO2, PO2, HCO3, FIO2 in the last 72 hours. 24 Hour Results:  Recent Results (from the past 24 hour(s))   CBC WITH AUTOMATED DIFF    Collection Time: 10/25/21 11:02 AM   Result Value Ref Range    WBC 8.4 3.6 - 11.0 K/uL    RBC 2.91 (L) 3.80 - 5.20 M/uL    HGB 7.9 (L) 11.5 - 16.0 g/dL    HCT 25.0 (L) 35.0 - 47.0 %    MCV 85.9 80.0 - 99.0 FL    MCH 27.1 26.0 - 34.0 PG    MCHC 31.6 30.0 - 36.5 g/dL    RDW 15.7 (H) 11.5 - 14.5 %    PLATELET 830 (H) 475 - 400 K/uL    MPV 9.1 8.9 - 12.9 FL    NRBC 0.0 0.0  WBC    ABSOLUTE NRBC 0.00 0.00 - 0.01 K/uL    NEUTROPHILS 63 32 - 75 %    LYMPHOCYTES 20 12 - 49 %    MONOCYTES 10 5 - 13 %    EOSINOPHILS 5 0 - 7 %    BASOPHILS 1 0 - 1 %    IMMATURE GRANULOCYTES 1 (H) 0 - 0.5 %    ABS. NEUTROPHILS 5.2 1.8 - 8.0 K/UL    ABS. LYMPHOCYTES 1.7 0.8 - 3.5 K/UL    ABS. MONOCYTES 0.8 0.0 - 1.0 K/UL    ABS. EOSINOPHILS 0.5 (H) 0.0 - 0.4 K/UL    ABS. BASOPHILS 0.1 0.0 - 0.1 K/UL    ABS. IMM.  GRANS. 0.1 (H) 0.00 - 0.04 K/UL    DF AUTOMATED     METABOLIC PANEL, BASIC    Collection Time: 10/25/21 11:02 AM   Result Value Ref Range    Sodium 129 (L) 136 - 145 mmol/L    Potassium 3.0 (L) 3.5 - 5.1 mmol/L    Chloride 92 (L) 97 - 108 mmol/L    CO2 28 21 - 32 mmol/L    Anion gap 9 5 - 15 mmol/L    Glucose 116 (H) 65 - 100 mg/dL    BUN 3 (L) 6 - 20 mg/dL    Creatinine 0.36 (L) 0.55 - 1.02 mg/dL    BUN/Creatinine ratio 8 (L) 12 - 20      GFR est AA >60 >60 ml/min/1.73m2    GFR est non-AA >60 >60 ml/min/1.73m2    Calcium 8.6 8.5 - 71.9 mg/dL   METABOLIC PANEL, BASIC    Collection Time: 10/26/21  6:36 AM   Result Value Ref Range    Sodium 127 (L) 136 - 145 mmol/L    Potassium 3.2 (L) 3.5 - 5.1 mmol/L    Chloride 91 (L) 97 - 108 mmol/L    CO2 29 21 - 32 mmol/L    Anion gap 7 5 - 15 mmol/L    Glucose 92 65 - 100 mg/dL    BUN 3 (L) 6 - 20 mg/dL    Creatinine 0.37 (L) 0.55 - 1.02 mg/dL    BUN/Creatinine ratio 8 (L) 12 - 20      GFR est AA >60 >60 ml/min/1.73m2    GFR est non-AA >60 >60 ml/min/1.73m2    Calcium 8.4 (L) 8.5 - 10.1 mg/dL       XR CHEST PORT   Final Result   Cardiomediastinal enlargement with vascular congestion. Basilar   prominent lung markings, consistent with edema and/or atelectasis. Correlate   clinically. Interim cervical and lumbar spine surgery compared with 2011. CT HEAD WO CONT   Final Result   Motion degraded, limited study. Within technical confines of this exam, no   evidence of acute intracranial process. Assessment:  Hyponatremia, possibly due to hypothyroidism. May also have some degree of low-level SIADH from SSRI    Metabolic encephalopathy, improved    Rheumatoid arthritis/Sjogren's syndrome, on chronic steroids    Chronic diastolic heart failure    Sarcoidosis, stable    Acute respiratory failure with hypoxia, improved    Obstructive sleep apnea, on CPAP    History of thoracic epidural abscess, status post laminectomy. Patient completes her 6 weeks of IV antibiotics today    Anemia of chronic disease    Plan:  Stop antibiotics after today  Add sodium chloride tablets    At this point obtaining urine sodium and osmolality would be pointless since she has been on diuretics as well as fluids    Care Plan discussed with: Patient/Family    Disposition: Likely discharge within 24 hours    Total time spent with patient: 30 minutes.     June Valladares MD

## 2021-10-26 NOTE — PROGRESS NOTES
Problem: Self Care Deficits Care Plan (Adult)  Goal: *Acute Goals and Plan of Care (Insert Text)  Description: Pt will be Mod I sup <> sit in prep for EOB ADLs  Pt will be Mod I grooming sitting EOB  Pt will be Mod I LE dressing sitting EOB/long sit  Pt will be Mod I sit <>  prep for toileting LRAD  Pt will be Mod I toileting/toilet transfer/cloth mgmt LRAD  Pt will be IND following UE HEP in prep for self care tasks   Outcome: Progressing Towards Goal   OCCUPATIONAL THERAPY TREATMENT  Patient: Fazal Hsu (89 y.o. female)  Date: 10/26/2021  Diagnosis: Acute encephalopathy [G93.40] <principal problem not specified>       Precautions: FALL  Chart, occupational therapy assessment, plan of care, and goals were reviewed. ASSESSMENT  Patient continues with skilled OT services and is progressing towards goals. Patient received semi supine in bed upon MENDES'S arrival  and agreeable to work with therapist.  Pt reporting not feeling well but agreed to participate in some therapy this morning. SBA/set-up for grooming (washed face) in semi supine. Patient educated on and completed bilateral UE HEP in prep for self care task and strength for OOB ADL'S, see grid below. Pt left resting comfortably as she declined any further self care or therapy 2/2 to pain and not feeling well. Patient would benefit from continued skilled Occupational services while at [address] [place] [address] in order to increase safety and independence with self care and functional tranfers/mobility. Recommend at discharge to SNF when medically appropriate. Current Level of Function Impacting Discharge (ADLs): SBA/set-up for simple grooming    Other factors to consider for discharge: Time of onset, medical prognosis/diagnosis, severity of deficits, PLOF, home environment, and family support          PLAN :  Patient continues to benefit from skilled intervention to address the above impairments. Continue treatment per established plan of care.   to address goals. Recommend next OT session: EOB for grooming    Recommendation for discharge: (in order for the patient to meet his/her long term goals)  Remington Veliz    This discharge recommendation:  Has been made in collaboration with the attending provider and/or case management    IF patient discharges home will need the following DME: TBD       SUBJECTIVE:   Patient stated Dorette Minus do that.     OBJECTIVE DATA SUMMARY:   Cognitive/Behavioral Status:  Neurologic State: Alert  Orientation Level: Oriented X4  Cognition: Follows commands             Functional Mobility and Transfers for ADLs:  Bed Mobility:       Transfers:             Balance:       ADL Intervention:       Grooming  Grooming Assistance: Stand-by assistance;Set-up  Position Performed: Long sitting on bed  Washing Face: Stand-by assistance;Set-up  Washing Hands: Stand-by assistance;Set-up         Therapeutic Exercises:   Exercise Sets Reps AROM AAROM PROM Self PROM Comments   Shoulder flex/ext 1 10 [x] [] [] [] Semi supine, with rest breaks   Elbow flex/ext 2 10 [x] [] [] []    Wrist flex/ext 2 10 [x] [] [] []    Chest press 2 10 [x] [] [] []         Pain:  8/10 back and stomach area    Activity Tolerance:   Fair  Please refer to the flowsheet for vital signs taken during this treatment. After treatment patient left in no apparent distress:   Supine in bed, Call bell within reach, Bed / chair alarm activated and Side rails x 3    COMMUNICATION/COLLABORATION:   The patients plan of care was discussed with: Registered nurse.      CINTHYA Sheffield  Time Calculation: 12 mins

## 2021-10-26 NOTE — ROUTINE PROCESS
Bedside shift report given to Bradford Castleman, RN  (oncoming nurse) by Jessica Lynn RN (off going nurse). Report to include SBAR, plan of care, recent results, discharge planning, and patient's questions and concerns.

## 2021-10-27 VITALS
TEMPERATURE: 98.7 F | RESPIRATION RATE: 16 BRPM | SYSTOLIC BLOOD PRESSURE: 134 MMHG | WEIGHT: 247.36 LBS | HEART RATE: 82 BPM | DIASTOLIC BLOOD PRESSURE: 68 MMHG | BODY MASS INDEX: 41.16 KG/M2 | OXYGEN SATURATION: 96 %

## 2021-10-27 LAB
ANION GAP SERPL CALC-SCNC: 8 MMOL/L (ref 5–15)
BUN SERPL-MCNC: 2 MG/DL (ref 6–20)
BUN/CREAT SERPL: 5 (ref 12–20)
CA-I BLD-MCNC: 8.3 MG/DL (ref 8.5–10.1)
CHLORIDE SERPL-SCNC: 93 MMOL/L (ref 97–108)
CO2 SERPL-SCNC: 29 MMOL/L (ref 21–32)
CREAT SERPL-MCNC: 0.41 MG/DL (ref 0.55–1.02)
GLUCOSE SERPL-MCNC: 93 MG/DL (ref 65–100)
POTASSIUM SERPL-SCNC: 3.2 MMOL/L (ref 3.5–5.1)
SODIUM SERPL-SCNC: 130 MMOL/L (ref 136–145)

## 2021-10-27 PROCEDURE — 36415 COLL VENOUS BLD VENIPUNCTURE: CPT

## 2021-10-27 PROCEDURE — 74011250637 HC RX REV CODE- 250/637: Performed by: INTERNAL MEDICINE

## 2021-10-27 PROCEDURE — 74011250636 HC RX REV CODE- 250/636: Performed by: HOSPITALIST

## 2021-10-27 PROCEDURE — 74011250637 HC RX REV CODE- 250/637: Performed by: HOSPITALIST

## 2021-10-27 PROCEDURE — 74011636637 HC RX REV CODE- 636/637: Performed by: HOSPITALIST

## 2021-10-27 PROCEDURE — 80048 BASIC METABOLIC PNL TOTAL CA: CPT

## 2021-10-27 RX ORDER — SODIUM CHLORIDE TAB 1 GM 1 G
1 TAB MISCELLANEOUS 2 TIMES DAILY WITH MEALS
Qty: 60 TABLET | Refills: 0 | Status: SHIPPED
Start: 2021-10-27

## 2021-10-27 RX ADMIN — ENOXAPARIN SODIUM 40 MG: 40 INJECTION SUBCUTANEOUS at 08:28

## 2021-10-27 RX ADMIN — HYDROMORPHONE HYDROCHLORIDE 2 MG: 2 TABLET ORAL at 05:32

## 2021-10-27 RX ADMIN — HYDROMORPHONE HYDROCHLORIDE 2 MG: 2 TABLET ORAL at 11:15

## 2021-10-27 RX ADMIN — CYCLOBENZAPRINE 10 MG: 10 TABLET, FILM COATED ORAL at 08:28

## 2021-10-27 RX ADMIN — ESCITALOPRAM OXALATE 20 MG: 10 TABLET ORAL at 08:28

## 2021-10-27 RX ADMIN — Medication 1 G: at 08:28

## 2021-10-27 RX ADMIN — GABAPENTIN 400 MG: 400 CAPSULE ORAL at 08:28

## 2021-10-27 RX ADMIN — Medication 1 G: at 11:15

## 2021-10-27 RX ADMIN — CEVIMELINE 30 MG: 30 CAPSULE ORAL at 11:18

## 2021-10-27 RX ADMIN — LEVOTHYROXINE SODIUM 150 MCG: 0.07 TABLET ORAL at 08:28

## 2021-10-27 RX ADMIN — GABAPENTIN 400 MG: 400 CAPSULE ORAL at 16:19

## 2021-10-27 RX ADMIN — PANTOPRAZOLE SODIUM 40 MG: 40 TABLET, DELAYED RELEASE ORAL at 08:28

## 2021-10-27 RX ADMIN — PREDNISONE 5 MG: 5 TABLET ORAL at 08:28

## 2021-10-27 RX ADMIN — CEVIMELINE 30 MG: 30 CAPSULE ORAL at 16:19

## 2021-10-27 RX ADMIN — Medication 1 G: at 16:19

## 2021-10-27 NOTE — DISCHARGE INSTRUCTIONS
Patient Education        Hyponatremia: Care Instructions  Your Care Instructions     Hyponatremia (say \"yh-cb-ooq-TREE-billy-uh\") means that you don't have enough sodium in your blood. It can cause nausea, vomiting, and headaches. Or you may not feel hungry. In serious cases, it can cause seizures, a coma, or even death. Hyponatremia is not a disease. It is a problem caused by something else, such as medicines or exercising for a long time in hot weather. You can get hyponatremia if you lose a lot of fluids and then you drink a lot of water or other liquids that don't have much sodium. You can also get it if you have kidney, liver, heart, or other health problems. Treatment is focused on getting your sodium levels back to normal.  Follow-up care is a key part of your treatment and safety. Be sure to make and go to all appointments, and call your doctor if you are having problems. It's also a good idea to know your test results and keep a list of the medicines you take. How can you care for yourself at home? · If your doctor recommends it, drink fluids that have sodium. Sports drinks are a good choice. Or you can eat salty foods. · If your doctor recommends it, limit the amount of water you drink. And limit fluids that are mostly water. These include tea, coffee, and juice. · Take your medicines exactly as prescribed. Call your doctor if you have any problems with your medicine. · Get your sodium levels tested when your doctor tells you to. When should you call for help? Call 911 anytime you think you may need emergency care. For example, call if:    · You have a seizure.     · You passed out (lost consciousness).    Call your doctor now or seek immediate medical care if:    · You are confused or it is hard to focus.     · You have little or no appetite.     · You feel sick to your stomach or you vomit.     · You have a headache.     · You have mood changes.     · You feel more tired than usual.   Watch closely for changes in your health, and be sure to contact your doctor if:    · You do not get better as expected. Where can you learn more? Go to http://www.gray.com/  Enter U075 in the search box to learn more about \"Hyponatremia: Care Instructions. \"  Current as of: June 17, 2021               Content Version: 13.0  © 1305-2179 [employer]. Care instructions adapted under license by [employer] (which disclaims liability or warranty for this information). If you have questions about a medical condition or this instruction, always ask your healthcare professional. [place] [address] any warranty or liability for your use of this information. INSTRUCTIONS ON DISCHARGE     (1) please STOP percocet.      (2) F/u with PCP in 1 week and we have to try and wean off medications     (3) keep your followups with ID and neurosurgery as scheduled

## 2021-10-27 NOTE — PROGRESS NOTES
CM spoke with liaison at [facility] at [facility] at [address] 12 [facility], [facility], [address],  Patient can be accepted back to SNF today. Patient will be going to Room 28B. Report can be called to [phone]. CM spoke to patient and second IMM completed and placed on chart. Patient is not appealing discharge. Discharge plan of care/case management plan validated with provider discharge order.

## 2021-10-27 NOTE — PROGRESS NOTES
Discharge plan of care/case management plan validated with provider discharge order. PICC line removed per Dr. Jessica Sharp, catheter intact. [phone]. Pt and  aware of transport to rehab. Awaiting [facility] [facility] services to transport services.

## 2021-10-27 NOTE — DISCHARGE SUMMARY
Physician Discharge Summary     Patient ID:    Raj Villatoro  [id number]  73 y.o.  1950    Admit date: 10/23/2021    Discharge date : 10/27/2021    Chronic Diagnoses:    Problem List as of 10/27/2021 Date Reviewed: 9/9/2021        Codes Class Noted - Resolved    Acute encephalopathy ICD-10-CM: G93.40  ICD-9-CM: 348.30  10/23/2021 - Present        Sepsis ([address] ICD-10-CM: A41.9  ICD-9-CM: 038.9, 995.91  9/9/2021 - Present        Thoracic stenosis ICD-10-CM: M48.04  ICD-9-CM: 724.01  8/27/2021 - Present        HNP (herniated nucleus pulposus), lumbar ICD-10-CM: M51.26  ICD-9-CM: 722.10  3/16/2018 - Present          22    Final Diagnoses:   Acute encephalopathy [G93.40]    Reason for Hospitalization:  She is a 72-year-old female with a history of rheumatoid arthritis on chronic steroids, COPD, Sjogren's syndrome and diastolic heart failure who was discharged from another facility in mid September after a complicated hospital stay for septic shock and epidural abscess with drainage that grew MRSA. She was discharged with a PICC line for IV daptomycin and rifampin for 6 weeks since she apparently had an inadequate response to vancomycin.     She was admitted with unresponsiveness from the nursing facility and responded to Narcan. She was initially on 6 L nasal cannula. CT of the head was negative.     Her sodium was 127 on admission       Hospital Course:        71F, h/o sjogrens syndrome, RA on prednisone, fibromylgia, depression, COPD not on oxygen and CHFpEF not on lasix     A brief into: Importantly she has a history of  Thoracic spinal stenosis, s/p laminectomy. On 9/15/2021 she was discharged from peripheral hospital- a complicated hospital stay where she presented with septic shock s/p epidural abscess s/p drainage that grew MRSA, she has a PICC line and was on daptomycin and rifampin for 6 weeks. SHE HAD INADEQUATE RESPONSE TO [facility] has 4 more days of abx as per the charts. And it was in that admission she was started on percocet     Not to mention she is on dilaudid 2 mg every 6 hourly, with flexeril, Zanaflex, amitriptyline, duloxetine      She present with unresponsiveness from the nursing home in the morning. She arrived via EMS and received 4mg narcan, and ER gave 6 mg naracn with improvement in mentation. She was on 6L and weaned      She improved clinically, completed her course of rifampin and daptomycin    It was advised to stop percocet and continue on the same regimen of her pain meds. She will go back to the rehab, and followup with PCP for pain meds weaning, neurosurgery as scheduled. I added sodium chloride tablets for her hyponatremia with improvement      INSTRUCTIONS ON DISCHARGE     (1) please STOP percocet. (2) F/u with PCP in 1 week and we have to try and wean off medications     (3) keep your followups with ID and neurosurgery as scheduled      Discharge Medications:   Current Discharge Medication List      START taking these medications    Details   sodium chloride 1 gram tablet Take 1 Tablet by mouth two (2) times daily (with meals). Qty: 60 Tablet, Refills: 0  Start date: 10/27/2021         CONTINUE these medications which have NOT CHANGED    Details   !! cyclobenzaprine (FLEXERIL) 10 mg tablet Take 1 Tablet by mouth three (3) times daily as needed for Muscle Spasm(s). Qty: 30 Tablet, Refills: 0    Associated Diagnoses: Wound infection after surgery      HYDROmorphone (Dilaudid) 2 mg tablet Take 2 mg by mouth every six (6) hours as needed for Pain. ondansetron hcl (Zofran) 4 mg tablet Take 4 mg by mouth every six (6) hours as needed for Nausea or Vomiting. !! cyclobenzaprine (FLEXERIL) 10 mg tablet Take 1 Tablet by mouth three (3) times daily as needed for Muscle Spasm(s). Qty: 30 Tablet, Refills: 0    Associated Diagnoses: Thoracic stenosis      carvediloL (COREG) 6.25 mg tablet Take 6.25 mg by mouth two (2) times daily (with meals). memantine (NAMENDA) 10 mg tablet Take 10 mg by mouth two (2) times a day. donepeziL (ARICEPT) 5 mg tablet Take 5 mg by mouth nightly. predniSONE (DELTASONE) 5 mg tablet Take 5 mg by mouth daily. tiZANidine (ZANAFLEX) 4 mg capsule Take 8 mg by mouth three (3) times daily. fluticasone furoate-vilanteroL (Breo Ellipta) 100-25 mcg/dose inhaler Take 1 Puff by inhalation daily. albuterol (PROVENTIL HFA, VENTOLIN HFA, PROAIR HFA) 90 mcg/actuation inhaler Take 2 Puffs by inhalation every six (6) hours as needed for Wheezing. alendronate (FOSAMAX) 35 mg tablet Take 35 mg by mouth every seven (7) days. cyanocobalamin (VITAMIN B12) 1,000 mcg/mL injection 1,000 mcg by IntraMUSCular route every thirty (30) days. metroNIDAZOLE (METROGEL) 0.75 % topical gel Apply  to affected area two (2) times a day.      gabapentin (NEURONTIN) 800 mg tablet Take 800 mg by mouth three (3) times daily. escitalopram oxalate (LEXAPRO) 20 mg tablet Take 20 mg by mouth daily. cetirizine (ZYRTEC) 10 mg tablet Take 10 mg by mouth daily. amitriptyline (ELAVIL) 25 mg tablet Take 25 mg by mouth nightly. lisinopril (PRINIVIL, ZESTRIL) 5 mg tablet Take 5 mg by mouth daily. cevimeline (EVOXAC) 30 mg capsule Take 30 mg by mouth three (3) times daily. DULoxetine (Cymbalta) 60 mg capsule Take 60 mg by mouth nightly. levothyroxine (Synthroid) 150 mcg tablet Take 150 mcg by mouth daily. Omeprazole delayed release (PRILOSEC D/R) 20 mg tablet Take 20 mg by mouth daily. !! - Potential duplicate medications found. Please discuss with provider. Follow up Care:    1. Tammy Oliveros MD in 1-2 weeks. Please call to set up an appointment shortly after discharge. Diet:  regulkar    Disposition:  HH or IRF    Advanced Directive:   FULL x   DNR      Discharge Exam:  Physical Exam  Constitutional:       Appearance: Normal appearance.  She is  HENT:      Head: Normocephalic and atraumatic.      Right Ear: External ear normal.      Left Ear: External ear normal.      Nose: Nose normal.      Mouth/Throat:      Mouth: Mucous membranes are moist.   Eyes:      Extraocular Movements: Extraocular movements intact.      Right eye: No nystagmus.      Left eye: No nystagmus.      Conjunctiva/sclera: Conjunctivae normal.      Pupils:         Comments:   Cardiovascular:      Rate and Rhythm: Normal rate and regular rhythm.      Pulses: Normal pulses.      Heart sounds: Normal heart sounds. Pulmonary:      Effort: Pulmonary effort is normal. No respiratory distress.      Breath sounds: Normal breath sounds. No stridor. No wheezing, rhonchi or rales. Abdominal:      General: Abdomen is flat. There is no distension.      Palpations: Abdomen is soft.      Tenderness: There is no abdominal tenderness.      Comments: Midline scar   Musculoskeletal:         General: Normal range of motion.      Cervical back: Normal range of motion. Skin:     General: Skin is warm and dry.      Capillary Refill: Capillary refill takes less than 2 seconds. Neurological:      Mental Status: She isalert.      GCS: GCS eye subscore is 3. GCS verbal subscore is 2. GCS motor subscore is 5. Psychiatric: [place] and Affect: Mood normal.         Behavior: Behavior normal.     CONSULTATIONS:none    Significant Diagnostic Studies:     Radiologic Studies -   Results from Hospital Encounter encounter on 10/23/21    XR CHEST PORT    Narrative  Portable chest, AP upright, 1341 hours. Comparison with 12/8/2011. Left upper  abdomen clips, stable. Upper lumbar and thoracolumbar junction spinal hardware,  new since the prior exam. Lower anterior cervical anterior instrumented spinal  fusion, new since the prior exam. Relative pulmonary hypoinflation.  Increased  size of cardiomediastinal silhouette, at least in part magnified by exam  technique and pulmonary hypoinflation but possibly also related to enlarged  heart size or pericardial fluid. Central vascular congestion and prominent  basilar lung markings, accentuated by pulmonary hypoinflation. Normal appearance  of right PICC line. No pneumothorax. Impression  Cardiomediastinal enlargement with vascular congestion. Basilar  prominent lung markings, consistent with edema and/or atelectasis. Correlate  clinically. Interim cervical and lumbar spine surgery compared with 2011.      CT Results  (Last 48 hours)    None              Discharge time spent 35 minutes    Signed:  Bibiana Caban MD  10/27/2021  12:50 PM

## 2021-10-30 LAB
BACTERIA SPEC CULT: NORMAL
SPECIAL REQUESTS,SREQ: NORMAL

## 2022-03-18 PROBLEM — N39.0 UTI (URINARY TRACT INFECTION): Status: ACTIVE | Noted: 2021-10-14

## 2022-03-18 PROBLEM — M51.26 HNP (HERNIATED NUCLEUS PULPOSUS), LUMBAR: Status: ACTIVE | Noted: 2018-03-16

## 2022-03-18 PROBLEM — M48.04 THORACIC STENOSIS: Status: ACTIVE | Noted: 2021-08-27

## 2022-03-19 PROBLEM — G93.40 ACUTE ENCEPHALOPATHY: Status: ACTIVE | Noted: 2021-10-23

## 2022-03-19 PROBLEM — A41.9 SEPSIS (HCC): Status: ACTIVE | Noted: 2021-10-14

## 2022-03-19 PROBLEM — E87.1 HYPONATREMIA: Status: ACTIVE | Noted: 2021-10-21

## 2022-03-19 PROBLEM — A41.9 SEPSIS (HCC): Status: ACTIVE | Noted: 2021-09-09

## 2023-02-01 RX ORDER — CEVIMELINE HYDROCHLORIDE 30 MG/1
30 CAPSULE ORAL 3 TIMES DAILY
COMMUNITY

## 2023-02-01 RX ORDER — BACLOFEN 5 MG/1
5 TABLET ORAL
COMMUNITY

## 2023-02-01 RX ORDER — CYCLOBENZAPRINE HCL 10 MG
10 TABLET ORAL 3 TIMES DAILY PRN
COMMUNITY
Start: 2021-08-30

## 2023-02-01 RX ORDER — OMEPRAZOLE 20 MG/1
20 TABLET, DELAYED RELEASE ORAL DAILY
COMMUNITY

## 2023-02-01 RX ORDER — GABAPENTIN 800 MG/1
800 TABLET ORAL 3 TIMES DAILY
COMMUNITY

## 2023-02-01 RX ORDER — CARVEDILOL 6.25 MG/1
TABLET ORAL 2 TIMES DAILY WITH MEALS
COMMUNITY

## 2023-02-01 RX ORDER — CYANOCOBALAMIN 1000 UG/ML
1000 INJECTION, SOLUTION INTRAMUSCULAR; SUBCUTANEOUS
COMMUNITY

## 2023-02-01 RX ORDER — LEVOTHYROXINE SODIUM 0.15 MG/1
TABLET ORAL
COMMUNITY

## 2023-02-01 RX ORDER — CELECOXIB 100 MG/1
100 CAPSULE ORAL 2 TIMES DAILY
COMMUNITY

## 2023-02-01 RX ORDER — DULOXETIN HYDROCHLORIDE 60 MG/1
60 CAPSULE, DELAYED RELEASE ORAL DAILY
COMMUNITY

## 2023-02-01 RX ORDER — ALBUTEROL SULFATE 90 UG/1
AEROSOL, METERED RESPIRATORY (INHALATION)
COMMUNITY

## 2023-02-01 RX ORDER — METRONIDAZOLE 7.5 MG/G
GEL TOPICAL 2 TIMES DAILY
COMMUNITY

## 2023-02-01 RX ORDER — DONEPEZIL HYDROCHLORIDE 5 MG/1
TABLET, FILM COATED ORAL
COMMUNITY

## 2023-02-01 RX ORDER — ESCITALOPRAM OXALATE 20 MG/1
20 TABLET ORAL DAILY
COMMUNITY

## 2023-02-01 RX ORDER — FOLIC ACID 1 MG/1
3 TABLET ORAL DAILY
COMMUNITY

## 2023-02-01 RX ORDER — PREDNISONE 1 MG/1
TABLET ORAL
COMMUNITY

## 2023-02-01 RX ORDER — HYDROMORPHONE HYDROCHLORIDE 2 MG/1
2 TABLET ORAL EVERY 6 HOURS PRN
COMMUNITY

## 2023-02-01 RX ORDER — OXYCODONE AND ACETAMINOPHEN 10; 325 MG/1; MG/1
TABLET ORAL
COMMUNITY

## 2023-02-01 RX ORDER — LISINOPRIL 5 MG/1
TABLET ORAL DAILY
COMMUNITY

## 2023-02-01 RX ORDER — FLUTICASONE FUROATE AND VILANTEROL 100; 25 UG/1; UG/1
1 POWDER RESPIRATORY (INHALATION) DAILY
COMMUNITY

## 2023-02-01 RX ORDER — SODIUM CHLORIDE 1000 MG
1 TABLET, SOLUBLE MISCELLANEOUS 2 TIMES DAILY WITH MEALS
COMMUNITY
Start: 2021-10-27

## 2023-02-01 RX ORDER — TIZANIDINE HYDROCHLORIDE 4 MG/1
8 CAPSULE, GELATIN COATED ORAL 3 TIMES DAILY
COMMUNITY

## 2023-02-01 RX ORDER — AMITRIPTYLINE HYDROCHLORIDE 25 MG/1
TABLET, FILM COATED ORAL
COMMUNITY

## 2023-02-01 RX ORDER — RIFAMPIN 300 MG/1
CAPSULE ORAL
COMMUNITY

## 2023-02-01 RX ORDER — ONDANSETRON 4 MG/1
4 TABLET, FILM COATED ORAL EVERY 6 HOURS PRN
COMMUNITY

## 2023-02-01 RX ORDER — MEMANTINE HYDROCHLORIDE 10 MG/1
TABLET ORAL DAILY
COMMUNITY

## 2023-02-01 RX ORDER — HYDRALAZINE HYDROCHLORIDE 100 MG/1
100 TABLET, FILM COATED ORAL 3 TIMES DAILY
COMMUNITY
Start: 2021-10-21

## 2023-02-01 RX ORDER — ALENDRONATE SODIUM 35 MG/1
35 TABLET ORAL
COMMUNITY

## 2023-02-01 RX ORDER — CETIRIZINE HYDROCHLORIDE 10 MG/1
10 TABLET ORAL DAILY
COMMUNITY

## 2025-01-22 ENCOUNTER — TRANSCRIBE ORDERS (OUTPATIENT)
Facility: HOSPITAL | Age: 75
End: 2025-01-22

## 2025-01-22 DIAGNOSIS — M17.11 ARTHRITIS OF KNEE, RIGHT: Primary | ICD-10-CM

## 2025-02-03 ENCOUNTER — TRANSCRIBE ORDERS (OUTPATIENT)
Facility: HOSPITAL | Age: 75
End: 2025-02-03

## 2025-02-03 ENCOUNTER — HOSPITAL ENCOUNTER (OUTPATIENT)
Facility: HOSPITAL | Age: 75
Discharge: HOME | End: 2025-02-06
Payer: MEDICARE

## 2025-02-03 DIAGNOSIS — M17.11 OSTEOARTHRITIS OF RIGHT KNEE, UNSPECIFIED OSTEOARTHRITIS TYPE: Primary | ICD-10-CM

## 2025-02-03 DIAGNOSIS — M17.11 OSTEOARTHRITIS OF RIGHT KNEE, UNSPECIFIED OSTEOARTHRITIS TYPE: ICD-10-CM

## 2025-02-03 LAB
ALBUMIN SERPL-MCNC: 3.6 G/DL (ref 3.4–5)
ALBUMIN/GLOB SERPL: 1 (ref 0.8–1.7)
ALP SERPL-CCNC: 125 U/L (ref 45–117)
ALT SERPL-CCNC: 18 U/L (ref 13–56)
ANION GAP SERPL CALC-SCNC: 7 MMOL/L (ref 3–18)
APPEARANCE UR: ABNORMAL
APTT PPP: 28.2 SEC (ref 23–36.4)
AST SERPL-CCNC: 17 U/L (ref 10–38)
BACTERIA URNS QL MICRO: ABNORMAL /HPF
BASOPHILS # BLD: 0.07 K/UL (ref 0–0.1)
BASOPHILS NFR BLD: 0.6 % (ref 0–2)
BILIRUB SERPL-MCNC: 0.4 MG/DL (ref 0.2–1)
BILIRUB UR QL: NEGATIVE
BUN SERPL-MCNC: 16 MG/DL (ref 7–18)
BUN/CREAT SERPL: 20 (ref 12–20)
CALCIUM SERPL-MCNC: 9.5 MG/DL (ref 8.5–10.1)
CHLORIDE SERPL-SCNC: 94 MMOL/L (ref 100–111)
CO2 SERPL-SCNC: 27 MMOL/L (ref 21–32)
COLOR UR: YELLOW
CREAT SERPL-MCNC: 0.79 MG/DL (ref 0.6–1.3)
CRP SERPL-MCNC: 7 MG/DL (ref 0–0.3)
DIFFERENTIAL METHOD BLD: ABNORMAL
EOSINOPHIL # BLD: 0.12 K/UL (ref 0–0.4)
EOSINOPHIL NFR BLD: 1 % (ref 0–5)
EPITH CASTS URNS QL MICRO: ABNORMAL /LPF (ref 0–5)
ERYTHROCYTE [DISTWIDTH] IN BLOOD BY AUTOMATED COUNT: 13.9 % (ref 11.6–14.5)
ERYTHROCYTE [SEDIMENTATION RATE] IN BLOOD: 42 MM/HR (ref 0–30)
EST. AVERAGE GLUCOSE BLD GHB EST-MCNC: 103 MG/DL
GLOBULIN SER CALC-MCNC: 3.6 G/DL (ref 2–4)
GLUCOSE SERPL-MCNC: 87 MG/DL (ref 74–99)
GLUCOSE UR STRIP.AUTO-MCNC: NEGATIVE MG/DL
HBA1C MFR BLD: 5.2 % (ref 4.2–5.6)
HCT VFR BLD AUTO: 43.9 % (ref 35–45)
HGB BLD-MCNC: 14.3 G/DL (ref 12–16)
HGB UR QL STRIP: NEGATIVE
IMM GRANULOCYTES # BLD AUTO: 0.11 K/UL (ref 0–0.04)
IMM GRANULOCYTES NFR BLD AUTO: 0.9 % (ref 0–0.5)
INR PPP: 0.9 (ref 0.9–1.1)
KETONES UR QL STRIP.AUTO: NEGATIVE MG/DL
LEUKOCYTE ESTERASE UR QL STRIP.AUTO: ABNORMAL
LYMPHOCYTES # BLD: 1.46 K/UL (ref 0.9–3.3)
LYMPHOCYTES NFR BLD: 11.7 % (ref 21–52)
MCH RBC QN AUTO: 33 PG (ref 24–34)
MCHC RBC AUTO-ENTMCNC: 32.6 G/DL (ref 31–37)
MCV RBC AUTO: 101.4 FL (ref 78–100)
MONOCYTES # BLD: 1.14 K/UL (ref 0.05–1.2)
MONOCYTES NFR BLD: 9.1 % (ref 3–10)
NEUTS SEG # BLD: 9.61 K/UL (ref 1.8–8)
NEUTS SEG NFR BLD: 76.7 % (ref 40–73)
NITRITE UR QL STRIP.AUTO: POSITIVE
NRBC # BLD: 0 K/UL (ref 0–0.01)
NRBC BLD-RTO: 0 PER 100 WBC
PH UR STRIP: 6 (ref 5–8)
PLATELET # BLD AUTO: 361 K/UL (ref 135–420)
PMV BLD AUTO: 9.5 FL (ref 9.2–11.8)
POTASSIUM SERPL-SCNC: 4.6 MMOL/L (ref 3.5–5.5)
PROT SERPL-MCNC: 7.2 G/DL (ref 6.4–8.2)
PROT UR STRIP-MCNC: NEGATIVE MG/DL
PROTHROMBIN TIME: 12.8 SEC (ref 11.9–14.9)
RBC # BLD AUTO: 4.33 M/UL (ref 4.2–5.3)
RBC #/AREA URNS HPF: ABNORMAL /HPF (ref 0–5)
SODIUM SERPL-SCNC: 128 MMOL/L (ref 136–145)
SP GR UR REFRACTOMETRY: 1.01 (ref 1–1.03)
UROBILINOGEN UR QL STRIP.AUTO: 1 EU/DL (ref 0.2–1)
WBC # BLD AUTO: 12.5 K/UL (ref 4.6–13.2)
WBC URNS QL MICRO: ABNORMAL /HPF (ref 0–5)

## 2025-02-03 PROCEDURE — 86140 C-REACTIVE PROTEIN: CPT

## 2025-02-03 PROCEDURE — 83036 HEMOGLOBIN GLYCOSYLATED A1C: CPT

## 2025-02-03 PROCEDURE — 87186 SC STD MICRODIL/AGAR DIL: CPT

## 2025-02-03 PROCEDURE — 85652 RBC SED RATE AUTOMATED: CPT

## 2025-02-03 PROCEDURE — 85025 COMPLETE CBC W/AUTO DIFF WBC: CPT

## 2025-02-03 PROCEDURE — 81001 URINALYSIS AUTO W/SCOPE: CPT

## 2025-02-03 PROCEDURE — 80053 COMPREHEN METABOLIC PANEL: CPT

## 2025-02-03 PROCEDURE — 87086 URINE CULTURE/COLONY COUNT: CPT

## 2025-02-03 PROCEDURE — 85610 PROTHROMBIN TIME: CPT

## 2025-02-03 PROCEDURE — 87088 URINE BACTERIA CULTURE: CPT

## 2025-02-03 PROCEDURE — 85730 THROMBOPLASTIN TIME PARTIAL: CPT

## 2025-02-04 ENCOUNTER — HOSPITAL ENCOUNTER (OUTPATIENT)
Facility: HOSPITAL | Age: 75
Discharge: HOME | End: 2025-02-07
Attending: ORTHOPAEDIC SURGERY
Payer: MEDICARE

## 2025-02-04 DIAGNOSIS — M17.11 ARTHRITIS OF KNEE, RIGHT: ICD-10-CM

## 2025-02-04 LAB
BACTERIA SPEC CULT: NORMAL
BACTERIA SPEC CULT: NORMAL
SERVICE CMNT-IMP: NORMAL

## 2025-02-04 PROCEDURE — 73700 CT LOWER EXTREMITY W/O DYE: CPT

## 2025-02-05 LAB
BACTERIA SPEC CULT: ABNORMAL
CC UR VC: ABNORMAL
SERVICE CMNT-IMP: ABNORMAL

## 2025-02-14 ENCOUNTER — ANESTHESIA EVENT (OUTPATIENT)
Facility: HOSPITAL | Age: 75
End: 2025-02-14
Payer: MEDICARE

## 2025-02-19 ENCOUNTER — HOSPITAL ENCOUNTER (OUTPATIENT)
Facility: HOSPITAL | Age: 75
Setting detail: OUTPATIENT SURGERY
Discharge: HOME HEALTH | End: 2025-02-19
Attending: ORTHOPAEDIC SURGERY | Admitting: ORTHOPAEDIC SURGERY
Payer: MEDICARE

## 2025-02-19 ENCOUNTER — APPOINTMENT (OUTPATIENT)
Facility: HOSPITAL | Age: 75
End: 2025-02-19
Attending: ORTHOPAEDIC SURGERY
Payer: MEDICARE

## 2025-02-19 ENCOUNTER — ANESTHESIA (OUTPATIENT)
Facility: HOSPITAL | Age: 75
End: 2025-02-19
Payer: MEDICARE

## 2025-02-19 VITALS
BODY MASS INDEX: 41.49 KG/M2 | RESPIRATION RATE: 16 BRPM | HEIGHT: 62 IN | DIASTOLIC BLOOD PRESSURE: 53 MMHG | HEART RATE: 67 BPM | WEIGHT: 225.5 LBS | SYSTOLIC BLOOD PRESSURE: 126 MMHG | OXYGEN SATURATION: 93 % | TEMPERATURE: 97.3 F

## 2025-02-19 DIAGNOSIS — Z96.651 S/P TKR (TOTAL KNEE REPLACEMENT) NOT USING CEMENT, RIGHT: Primary | ICD-10-CM

## 2025-02-19 LAB
ABO + RH BLD: NORMAL
BLOOD GROUP ANTIBODIES SERPL: NORMAL
SPECIMEN EXP DATE BLD: NORMAL

## 2025-02-19 PROCEDURE — 36415 COLL VENOUS BLD VENIPUNCTURE: CPT

## 2025-02-19 PROCEDURE — 97161 PT EVAL LOW COMPLEX 20 MIN: CPT

## 2025-02-19 PROCEDURE — 2500000003 HC RX 250 WO HCPCS: Performed by: ANESTHESIOLOGY

## 2025-02-19 PROCEDURE — 7100000000 HC PACU RECOVERY - FIRST 15 MIN: Performed by: ORTHOPAEDIC SURGERY

## 2025-02-19 PROCEDURE — 3700000001 HC ADD 15 MINUTES (ANESTHESIA): Performed by: ORTHOPAEDIC SURGERY

## 2025-02-19 PROCEDURE — 2500000003 HC RX 250 WO HCPCS: Performed by: ORTHOPAEDIC SURGERY

## 2025-02-19 PROCEDURE — 3600000013 HC SURGERY LEVEL 3 ADDTL 15MIN: Performed by: ORTHOPAEDIC SURGERY

## 2025-02-19 PROCEDURE — 2709999900 HC NON-CHARGEABLE SUPPLY: Performed by: ORTHOPAEDIC SURGERY

## 2025-02-19 PROCEDURE — 2500000003 HC RX 250 WO HCPCS

## 2025-02-19 PROCEDURE — 6360000002 HC RX W HCPCS

## 2025-02-19 PROCEDURE — 6360000002 HC RX W HCPCS: Performed by: STUDENT IN AN ORGANIZED HEALTH CARE EDUCATION/TRAINING PROGRAM

## 2025-02-19 PROCEDURE — 6370000000 HC RX 637 (ALT 250 FOR IP): Performed by: ANESTHESIOLOGY

## 2025-02-19 PROCEDURE — 6360000002 HC RX W HCPCS: Performed by: ORTHOPAEDIC SURGERY

## 2025-02-19 PROCEDURE — 7100000011 HC PHASE II RECOVERY - ADDTL 15 MIN: Performed by: ORTHOPAEDIC SURGERY

## 2025-02-19 PROCEDURE — 97530 THERAPEUTIC ACTIVITIES: CPT

## 2025-02-19 PROCEDURE — 2580000003 HC RX 258

## 2025-02-19 PROCEDURE — 3600000003 HC SURGERY LEVEL 3 BASE: Performed by: ORTHOPAEDIC SURGERY

## 2025-02-19 PROCEDURE — 2500000003 HC RX 250 WO HCPCS: Performed by: STUDENT IN AN ORGANIZED HEALTH CARE EDUCATION/TRAINING PROGRAM

## 2025-02-19 PROCEDURE — 86901 BLOOD TYPING SEROLOGIC RH(D): CPT

## 2025-02-19 PROCEDURE — 3700000000 HC ANESTHESIA ATTENDED CARE: Performed by: ORTHOPAEDIC SURGERY

## 2025-02-19 PROCEDURE — 2580000003 HC RX 258: Performed by: ORTHOPAEDIC SURGERY

## 2025-02-19 PROCEDURE — 97116 GAIT TRAINING THERAPY: CPT

## 2025-02-19 PROCEDURE — 64447 NJX AA&/STRD FEMORAL NRV IMG: CPT | Performed by: ANESTHESIOLOGY

## 2025-02-19 PROCEDURE — 73560 X-RAY EXAM OF KNEE 1 OR 2: CPT

## 2025-02-19 PROCEDURE — 97535 SELF CARE MNGMENT TRAINING: CPT

## 2025-02-19 PROCEDURE — 86850 RBC ANTIBODY SCREEN: CPT

## 2025-02-19 PROCEDURE — 7100000010 HC PHASE II RECOVERY - FIRST 15 MIN: Performed by: ORTHOPAEDIC SURGERY

## 2025-02-19 PROCEDURE — 7100000001 HC PACU RECOVERY - ADDTL 15 MIN: Performed by: ORTHOPAEDIC SURGERY

## 2025-02-19 PROCEDURE — 97165 OT EVAL LOW COMPLEX 30 MIN: CPT

## 2025-02-19 PROCEDURE — 86900 BLOOD TYPING SEROLOGIC ABO: CPT

## 2025-02-19 PROCEDURE — C1776 JOINT DEVICE (IMPLANTABLE): HCPCS | Performed by: ORTHOPAEDIC SURGERY

## 2025-02-19 PROCEDURE — 2720000010 HC SURG SUPPLY STERILE: Performed by: ORTHOPAEDIC SURGERY

## 2025-02-19 PROCEDURE — 6360000002 HC RX W HCPCS: Performed by: ANESTHESIOLOGY

## 2025-02-19 DEVICE — TIBIAL BEARING INSERT - CS
Type: IMPLANTABLE DEVICE | Site: KNEE | Status: FUNCTIONAL
Brand: TRIATHLON

## 2025-02-19 DEVICE — PATELLA
Type: IMPLANTABLE DEVICE | Site: KNEE | Status: FUNCTIONAL
Brand: TRIATHLON

## 2025-02-19 DEVICE — TIBIAL COMPONENT
Type: IMPLANTABLE DEVICE | Site: KNEE | Status: FUNCTIONAL
Brand: TRIATHLON

## 2025-02-19 DEVICE — CRUCIATE RETAINING FEMORAL
Type: IMPLANTABLE DEVICE | Site: KNEE | Status: FUNCTIONAL
Brand: TRIATHLON

## 2025-02-19 RX ORDER — MELOXICAM 7.5 MG/1
15 TABLET ORAL DAILY
Status: DISCONTINUED | OUTPATIENT
Start: 2025-02-19 | End: 2025-02-19 | Stop reason: HOSPADM

## 2025-02-19 RX ORDER — OXYCODONE HYDROCHLORIDE 5 MG/1
5 TABLET ORAL EVERY 4 HOURS PRN
Status: DISCONTINUED | OUTPATIENT
Start: 2025-02-19 | End: 2025-02-19 | Stop reason: HOSPADM

## 2025-02-19 RX ORDER — ONDANSETRON 4 MG/1
4 TABLET, ORALLY DISINTEGRATING ORAL EVERY 8 HOURS PRN
Status: DISCONTINUED | OUTPATIENT
Start: 2025-02-19 | End: 2025-02-19 | Stop reason: HOSPADM

## 2025-02-19 RX ORDER — SODIUM CHLORIDE 0.9 % (FLUSH) 0.9 %
5-40 SYRINGE (ML) INJECTION EVERY 12 HOURS SCHEDULED
Status: DISCONTINUED | OUTPATIENT
Start: 2025-02-19 | End: 2025-02-19 | Stop reason: HOSPADM

## 2025-02-19 RX ORDER — HYDROCODONE BITARTRATE AND ACETAMINOPHEN 5; 325 MG/1; MG/1
1 TABLET ORAL EVERY 6 HOURS PRN
Qty: 28 TABLET | Refills: 0 | Status: SHIPPED | OUTPATIENT
Start: 2025-02-19 | End: 2025-02-26

## 2025-02-19 RX ORDER — SODIUM CHLORIDE, SODIUM LACTATE, POTASSIUM CHLORIDE, CALCIUM CHLORIDE 600; 310; 30; 20 MG/100ML; MG/100ML; MG/100ML; MG/100ML
INJECTION, SOLUTION INTRAVENOUS CONTINUOUS
Status: DISCONTINUED | OUTPATIENT
Start: 2025-02-19 | End: 2025-02-19 | Stop reason: HOSPADM

## 2025-02-19 RX ORDER — SUCCINYLCHOLINE/SOD CL,ISO/PF 100 MG/5ML
SYRINGE (ML) INTRAVENOUS
Status: DISCONTINUED | OUTPATIENT
Start: 2025-02-19 | End: 2025-02-19 | Stop reason: SDUPTHER

## 2025-02-19 RX ORDER — LABETALOL HYDROCHLORIDE 5 MG/ML
INJECTION, SOLUTION INTRAVENOUS
Status: DISCONTINUED | OUTPATIENT
Start: 2025-02-19 | End: 2025-02-19 | Stop reason: SDUPTHER

## 2025-02-19 RX ORDER — LIDOCAINE HYDROCHLORIDE 20 MG/ML
INJECTION, SOLUTION INTRAVENOUS
Status: DISCONTINUED | OUTPATIENT
Start: 2025-02-19 | End: 2025-02-19 | Stop reason: SDUPTHER

## 2025-02-19 RX ORDER — TRANEXAMIC ACID 10 MG/ML
INJECTION, SOLUTION INTRAVENOUS
Status: DISCONTINUED | OUTPATIENT
Start: 2025-02-19 | End: 2025-02-19 | Stop reason: SDUPTHER

## 2025-02-19 RX ORDER — FENTANYL CITRATE 50 UG/ML
50 INJECTION, SOLUTION INTRAMUSCULAR; INTRAVENOUS EVERY 5 MIN PRN
Status: DISCONTINUED | OUTPATIENT
Start: 2025-02-19 | End: 2025-02-19 | Stop reason: HOSPADM

## 2025-02-19 RX ORDER — MAGNESIUM HYDROXIDE 1200 MG/15ML
LIQUID ORAL CONTINUOUS PRN
Status: COMPLETED | OUTPATIENT
Start: 2025-02-19 | End: 2025-02-19

## 2025-02-19 RX ORDER — SODIUM CHLORIDE, SODIUM LACTATE, POTASSIUM CHLORIDE, CALCIUM CHLORIDE 600; 310; 30; 20 MG/100ML; MG/100ML; MG/100ML; MG/100ML
INJECTION, SOLUTION INTRAVENOUS
Status: DISCONTINUED | OUTPATIENT
Start: 2025-02-19 | End: 2025-02-19 | Stop reason: SDUPTHER

## 2025-02-19 RX ORDER — ONDANSETRON 2 MG/ML
4 INJECTION INTRAMUSCULAR; INTRAVENOUS
Status: DISCONTINUED | OUTPATIENT
Start: 2025-02-19 | End: 2025-02-19 | Stop reason: HOSPADM

## 2025-02-19 RX ORDER — MORPHINE SULFATE 4 MG/ML
INJECTION, SOLUTION INTRAMUSCULAR; INTRAVENOUS PRN
Status: DISCONTINUED | OUTPATIENT
Start: 2025-02-19 | End: 2025-02-19 | Stop reason: ALTCHOICE

## 2025-02-19 RX ORDER — FENTANYL CITRATE 50 UG/ML
INJECTION, SOLUTION INTRAMUSCULAR; INTRAVENOUS
Status: COMPLETED
Start: 2025-02-19 | End: 2025-02-19

## 2025-02-19 RX ORDER — ASPIRIN 325 MG
325 TABLET ORAL 2 TIMES DAILY
Qty: 60 TABLET | Refills: 0 | Status: SHIPPED | OUTPATIENT
Start: 2025-02-19

## 2025-02-19 RX ORDER — MIDAZOLAM HYDROCHLORIDE 1 MG/ML
INJECTION, SOLUTION INTRAMUSCULAR; INTRAVENOUS
Status: COMPLETED
Start: 2025-02-19 | End: 2025-02-19

## 2025-02-19 RX ORDER — CHLORHEXIDINE GLUCONATE 40 MG/ML
SOLUTION TOPICAL DAILY PRN
Status: DISCONTINUED | OUTPATIENT
Start: 2025-02-19 | End: 2025-02-19 | Stop reason: HOSPADM

## 2025-02-19 RX ORDER — SODIUM CHLORIDE 9 MG/ML
INJECTION, SOLUTION INTRAVENOUS CONTINUOUS
Status: DISCONTINUED | OUTPATIENT
Start: 2025-02-19 | End: 2025-02-19 | Stop reason: HOSPADM

## 2025-02-19 RX ORDER — ONDANSETRON 2 MG/ML
INJECTION INTRAMUSCULAR; INTRAVENOUS
Status: DISCONTINUED | OUTPATIENT
Start: 2025-02-19 | End: 2025-02-19 | Stop reason: SDUPTHER

## 2025-02-19 RX ORDER — SODIUM CHLORIDE 0.9 % (FLUSH) 0.9 %
5-40 SYRINGE (ML) INJECTION PRN
Status: DISCONTINUED | OUTPATIENT
Start: 2025-02-19 | End: 2025-02-19 | Stop reason: HOSPADM

## 2025-02-19 RX ORDER — HYDROMORPHONE HYDROCHLORIDE 1 MG/ML
0.5 INJECTION, SOLUTION INTRAMUSCULAR; INTRAVENOUS; SUBCUTANEOUS EVERY 5 MIN PRN
Status: DISCONTINUED | OUTPATIENT
Start: 2025-02-19 | End: 2025-02-19 | Stop reason: HOSPADM

## 2025-02-19 RX ORDER — TRANEXAMIC ACID 10 MG/ML
1000 INJECTION, SOLUTION INTRAVENOUS ONCE
Status: DISCONTINUED | OUTPATIENT
Start: 2025-02-19 | End: 2025-02-19 | Stop reason: HOSPADM

## 2025-02-19 RX ORDER — DEXAMETHASONE SODIUM PHOSPHATE 10 MG/ML
INJECTION, SOLUTION INTRAMUSCULAR; INTRAVENOUS
Status: DISCONTINUED | OUTPATIENT
Start: 2025-02-19 | End: 2025-02-19 | Stop reason: SDUPTHER

## 2025-02-19 RX ORDER — DEXAMETHASONE SODIUM PHOSPHATE 10 MG/ML
INJECTION, SOLUTION INTRAMUSCULAR; INTRAVENOUS
Status: COMPLETED
Start: 2025-02-19 | End: 2025-02-19

## 2025-02-19 RX ORDER — DEXMEDETOMIDINE HYDROCHLORIDE 100 UG/ML
INJECTION, SOLUTION INTRAVENOUS
Status: DISCONTINUED | OUTPATIENT
Start: 2025-02-19 | End: 2025-02-19 | Stop reason: SDUPTHER

## 2025-02-19 RX ORDER — ROPIVACAINE HYDROCHLORIDE 5 MG/ML
INJECTION, SOLUTION EPIDURAL; INFILTRATION; PERINEURAL
Status: DISCONTINUED | OUTPATIENT
Start: 2025-02-19 | End: 2025-02-19 | Stop reason: SDUPTHER

## 2025-02-19 RX ORDER — SODIUM CHLORIDE 9 MG/ML
INJECTION, SOLUTION INTRAVENOUS PRN
Status: DISCONTINUED | OUTPATIENT
Start: 2025-02-19 | End: 2025-02-19 | Stop reason: HOSPADM

## 2025-02-19 RX ORDER — DEXMEDETOMIDINE HYDROCHLORIDE 100 UG/ML
INJECTION, SOLUTION INTRAVENOUS
Status: COMPLETED
Start: 2025-02-19 | End: 2025-02-19

## 2025-02-19 RX ORDER — MIDAZOLAM HYDROCHLORIDE 1 MG/ML
INJECTION, SOLUTION INTRAMUSCULAR; INTRAVENOUS
Status: DISCONTINUED | OUTPATIENT
Start: 2025-02-19 | End: 2025-02-19 | Stop reason: SDUPTHER

## 2025-02-19 RX ORDER — NALOXONE HYDROCHLORIDE 0.4 MG/ML
INJECTION, SOLUTION INTRAMUSCULAR; INTRAVENOUS; SUBCUTANEOUS PRN
Status: DISCONTINUED | OUTPATIENT
Start: 2025-02-19 | End: 2025-02-19 | Stop reason: HOSPADM

## 2025-02-19 RX ORDER — ONDANSETRON 2 MG/ML
4 INJECTION INTRAMUSCULAR; INTRAVENOUS EVERY 6 HOURS PRN
Status: DISCONTINUED | OUTPATIENT
Start: 2025-02-19 | End: 2025-02-19 | Stop reason: HOSPADM

## 2025-02-19 RX ORDER — EPHEDRINE SULFATE/0.9% NACL/PF 50 MG/5 ML
SYRINGE (ML) INTRAVENOUS
Status: DISCONTINUED | OUTPATIENT
Start: 2025-02-19 | End: 2025-02-19

## 2025-02-19 RX ORDER — EPHEDRINE SULFATE 5 MG/ML
INJECTION INTRAVENOUS
Status: DISCONTINUED | OUTPATIENT
Start: 2025-02-19 | End: 2025-02-19 | Stop reason: SDUPTHER

## 2025-02-19 RX ORDER — FENTANYL CITRATE 50 UG/ML
INJECTION, SOLUTION INTRAMUSCULAR; INTRAVENOUS
Status: DISCONTINUED | OUTPATIENT
Start: 2025-02-19 | End: 2025-02-19 | Stop reason: SDUPTHER

## 2025-02-19 RX ORDER — OXYCODONE HYDROCHLORIDE 5 MG/1
10 TABLET ORAL PRN
Status: COMPLETED | OUTPATIENT
Start: 2025-02-19 | End: 2025-02-19

## 2025-02-19 RX ORDER — PROPOFOL 10 MG/ML
INJECTION, EMULSION INTRAVENOUS
Status: DISCONTINUED | OUTPATIENT
Start: 2025-02-19 | End: 2025-02-19 | Stop reason: SDUPTHER

## 2025-02-19 RX ORDER — OXYCODONE HYDROCHLORIDE 5 MG/1
5 TABLET ORAL PRN
Status: COMPLETED | OUTPATIENT
Start: 2025-02-19 | End: 2025-02-19

## 2025-02-19 RX ORDER — ACETAMINOPHEN 325 MG/1
650 TABLET ORAL EVERY 6 HOURS
Status: DISCONTINUED | OUTPATIENT
Start: 2025-02-19 | End: 2025-02-19 | Stop reason: HOSPADM

## 2025-02-19 RX ADMIN — DEXMEDETOMIDINE HYDROCHLORIDE 20 MCG: 100 INJECTION, SOLUTION INTRAVENOUS at 08:00

## 2025-02-19 RX ADMIN — PROPOFOL 30 MG: 10 INJECTION, EMULSION INTRAVENOUS at 08:15

## 2025-02-19 RX ADMIN — DEXAMETHASONE SODIUM PHOSPHATE 4 MG: 10 INJECTION, SOLUTION INTRAMUSCULAR; INTRAVENOUS at 08:00

## 2025-02-19 RX ADMIN — FENTANYL CITRATE 50 MCG: 50 INJECTION, SOLUTION INTRAMUSCULAR; INTRAVENOUS at 07:57

## 2025-02-19 RX ADMIN — Medication 2 G: at 08:23

## 2025-02-19 RX ADMIN — EPHEDRINE SULFATE 10 MG: 5 INJECTION INTRAVENOUS at 08:29

## 2025-02-19 RX ADMIN — HYDROMORPHONE HYDROCHLORIDE 1 MG: 1 INJECTION, SOLUTION INTRAMUSCULAR; INTRAVENOUS; SUBCUTANEOUS at 08:23

## 2025-02-19 RX ADMIN — Medication 20 MG: at 08:43

## 2025-02-19 RX ADMIN — LABETALOL HYDROCHLORIDE 5 MG: 5 INJECTION, SOLUTION INTRAVENOUS at 09:10

## 2025-02-19 RX ADMIN — ROPIVACAINE HYDROCHLORIDE 30 ML: 5 INJECTION, SOLUTION EPIDURAL; INFILTRATION; PERINEURAL at 08:00

## 2025-02-19 RX ADMIN — FENTANYL CITRATE 25 MCG: 50 INJECTION, SOLUTION INTRAMUSCULAR; INTRAVENOUS at 08:09

## 2025-02-19 RX ADMIN — SODIUM CHLORIDE: 0.9 INJECTION, SOLUTION INTRAVENOUS at 06:56

## 2025-02-19 RX ADMIN — TRANEXAMIC ACID 1 G: 10 INJECTION, SOLUTION INTRAVENOUS at 09:37

## 2025-02-19 RX ADMIN — ONDANSETRON 4 MG: 2 INJECTION, SOLUTION INTRAMUSCULAR; INTRAVENOUS at 09:23

## 2025-02-19 RX ADMIN — LIDOCAINE HYDROCHLORIDE 60 MG: 20 INJECTION, SOLUTION INTRAVENOUS at 08:14

## 2025-02-19 RX ADMIN — LABETALOL HYDROCHLORIDE 5 MG: 5 INJECTION, SOLUTION INTRAVENOUS at 08:59

## 2025-02-19 RX ADMIN — SODIUM CHLORIDE, SODIUM LACTATE, POTASSIUM CHLORIDE, AND CALCIUM CHLORIDE: 600; 310; 30; 20 INJECTION, SOLUTION INTRAVENOUS at 09:28

## 2025-02-19 RX ADMIN — Medication 2000 MG: at 11:20

## 2025-02-19 RX ADMIN — PROPOFOL 120 MG: 10 INJECTION, EMULSION INTRAVENOUS at 08:14

## 2025-02-19 RX ADMIN — OXYCODONE HYDROCHLORIDE 5 MG: 5 TABLET ORAL at 11:19

## 2025-02-19 RX ADMIN — TRANEXAMIC ACID 1 G: 10 INJECTION, SOLUTION INTRAVENOUS at 08:23

## 2025-02-19 RX ADMIN — MIDAZOLAM 2 MG: 1 INJECTION INTRAMUSCULAR; INTRAVENOUS at 07:57

## 2025-02-19 RX ADMIN — FENTANYL CITRATE 25 MCG: 50 INJECTION, SOLUTION INTRAMUSCULAR; INTRAVENOUS at 08:38

## 2025-02-19 ASSESSMENT — PAIN DESCRIPTION - FREQUENCY
FREQUENCY: CONTINUOUS

## 2025-02-19 ASSESSMENT — PAIN DESCRIPTION - ORIENTATION
ORIENTATION: RIGHT

## 2025-02-19 ASSESSMENT — PAIN SCALES - GENERAL
PAINLEVEL_OUTOF10: 6
PAINLEVEL_OUTOF10: 0
PAINLEVEL_OUTOF10: 8
PAINLEVEL_OUTOF10: 0
PAINLEVEL_OUTOF10: 8
PAINLEVEL_OUTOF10: 0
PAINLEVEL_OUTOF10: 3
PAINLEVEL_OUTOF10: 0
PAINLEVEL_OUTOF10: 8
PAINLEVEL_OUTOF10: 8

## 2025-02-19 ASSESSMENT — PAIN DESCRIPTION - PAIN TYPE
TYPE: SURGICAL PAIN

## 2025-02-19 ASSESSMENT — PAIN DESCRIPTION - DESCRIPTORS
DESCRIPTORS: ACHING

## 2025-02-19 ASSESSMENT — LIFESTYLE VARIABLES: SMOKING_STATUS: 0

## 2025-02-19 ASSESSMENT — PAIN DESCRIPTION - ONSET
ONSET: ON-GOING

## 2025-02-19 ASSESSMENT — PAIN - FUNCTIONAL ASSESSMENT
PAIN_FUNCTIONAL_ASSESSMENT: ACTIVITIES ARE NOT PREVENTED

## 2025-02-19 ASSESSMENT — PAIN DESCRIPTION - LOCATION
LOCATION: KNEE

## 2025-02-19 NOTE — DISCHARGE INSTRUCTIONS
Total Knee Arthroplasty Discharge Instructions   J Luis Houser M.D.      Please take the time to review the following instructions before you leave the hospital and use them as guidelines during your recovery from surgery. If you have any questions you may contact my office at [phone].     Wound Care / Dressing Changes:     Two days after your surgery date you should remove your dressing. A big, bulky dressing isn't necessary as long as there isn't any drainage from the incisions. If there is drainage you can put a band-aid, primapore, or mepilex dressing over the incision and change it daily until drainage stops. It isn't necessary to apply antibiotic ointment to your incisions. If you have glue over your incision do not peel it off.  If you have steri-strips over your incision they will start to peel off in 7-10 days. They don't need to be removed prior to that. When they begin to peel off you can remove them. They should all be removed by 14 days from you surgery. Keep a towel or gauze in any skin folds that may hang over the incision so that it stays dry.    Showering / Bathing:     You may only shower. You may shower if there is no drainage from your incisions. Your dressing may be removed for showering. You may get your incisions wet in the shower. Don't vigorously scrub the area where your incisions are. Apply a clean, dry dressing after drying off the area of your incisions. Don't take a tub bath, get in a swimming pool or Jacuzzi until the incisions are completely healed. Do not soak your incisions under water.     Weight Bearing Status / Braces:     __X___ Weight bearing as tolerated. Use crutches, walker, or cane as needed for support. You may move your joints as much as tolerated.     _____  \"Toe Touch\" weight bearing. Don't bear weight on the leg you had operated on. Use your toes only to steady yourself as you ambulate with crutches or a walker.     _____ Avoid extreme hip extension with

## 2025-02-19 NOTE — H&P
[facility]  History and Physical Exam    Patient: Alondra Fink MRN: [id number]  SSN: [id number]    YOB: 1950  Age: 74 y.o.  Sex: female      Subjective:      Chief Complaint: right knee pain    History of Present Illness:  Patient complains of knee pain on the affected side and difficulty ambulating. Pain is increased with increasing activity. Pain is increased with weight bearing. Pain interferes with activities of daily living. Patient has noticed decreased range of motion    Past Medical History:   Diagnosis Date    Arthritis 2015    osteo in knee    Back pain 2002    degenertive    Balance problem 2019    uses walker; gets dizzy    Bruises easily 1974    Cancer ([facility]) 2022    top of head; basal cell    Cataract 2014    had them removed    Chronic bronchitis ([facility]) 1995    has inhalers    Chronic fatigue syndrome 1985    with fibromyalgia    Chronic obstructive pulmonary disease ([facility]) 1995    mild    COVID-19 virus infection 02/2021    Edema of both legs 2019    Exercise tolerance finding 02/13/2025    patient can climb a small hill wihthout CP; she has SOB due to COPD; legs will not allow her to climb stairs.    Falls frequently 02/2018    6 times in the last 4 months    Fibroids 1994    Fibromyalgia 1985    GERD (gastroesophageal reflux disease) 2005    on med    Hiatal hernia 1965    Hypertension 2010    on med    Irregular heart beat 2021    palpitations- cardiologist stated everything ok    Migraines 2021    Neck pain 2015    Obesity     Osteoporosis     Psychiatric disorder 2015    DEPRESSION; on meds    PUD (peptic ulcer disease) 1998    PVD (peripheral vascular disease) 2015    RA (rheumatoid arthritis) ([facility]) 1985    Sarcoidosis of lung 2011    sees neurologist Rissa Quintanilla with [facility]    Short-term memory loss 2022    Sleep apnea 2015    uses CPAP ; advised to bring DOS    SOB (shortness of breath) 1995    due o COPD    Spinal stenosis 2002    Thyroid

## 2025-02-19 NOTE — PROGRESS NOTES
Occupational Therapy Goals:  Initiated 2/19/2025 to be met within 7-10 days.  Short Term Goals  Time Frame for Short Term Goals: 7 days  Short Term Goal 1: The patient will demonstrate ability to complete LB dressing tasks at supervision level with use of AE as needed.  Short Term Goal 2: The patient will demonstrate ability to complete LB bathing tasks at supervision level with use of AE as needed.  Short Term Goal 3: The patient will demonstrate ability to complete toilet transfers at supervision level.  Short Term Goal 4: The patient will demonstrate ability to complete toileting tasks at supervision level.  Short Term Goal 5: The patient will demonstrate ability to complete grooming tasks standing at sink at supervision level.    OCCUPATIONAL THERAPY EVALUATION    Patient: Alondra Fink (74 y.o. female)  Date: 2/19/2025  Primary Diagnosis: Primary osteoarthritis of right knee [M17.11]  Procedure(s) (LRB):  RIGHT TOTAL KNEE REPLACEMENT-  MAKOPLASTY \"SPEC POP\" (Right) Day of Surgery   Precautions: Fall Risk, Weight Bearing, Surgical Protocols, Right Lower Extremity Weight Bearing: Weight Bearing As Tolerated,  ,  ,  ,  ,  ,    PLOF: Pt was independent in ADLs and ambulated at mod I with rollator prior.     ASSESSMENT : Pt cleared for therapy evaluation by RN. Upon therapist's arrival, pt was seated in recliner reporting 5/10 pain in R knee. Pt was agreeable to occupational therapy evaluation. Pt seen with PT for second set of skilled hands. OT educated pt regarding the role of occupational therapy. Pt verbalized 100% understanding. OT educated pt regarding knee precautions and weight bearing status s/p R TKA. OT educated pt regarding the intended wear schedule for compression stockings s/p surgery. Pt verbalized 100% understanding. OT educated pt regarding use of AE and use of adaptive ADL strategies to improve ease and safety with dressing tasks. Pt verbalized 100% understanding. Pt completed UB dressing tasks

## 2025-02-19 NOTE — ANESTHESIA PRE PROCEDURE
Department of Anesthesiology  Preprocedure Note       Name:  Alondra Fink   Age:  74 y.o.  :  1950                                          MRN:  [id number]         Date:  2025      Surgeon: Surgeon(s):  J Luis Houser MD    Procedure: Procedure(s):  RIGHT TOTAL KNEE REPLACEMENT-  MAKOPLASTY \"SPEC POP\"    Medications prior to admission:   Prior to Admission medications    Medication Sig Start Date End Date Taking? Authorizing Provider   amoxicillin-clavulanate (AUGMENTIN) 875-125 MG per tablet Take 1 tablet by mouth in the morning and 1 tablet in the evening. for 7 days. 25  Yes Provider, MD Roberto   HYDROcodone-acetaminophen (NORCO) 5-325 MG per tablet Take 1 tablet by mouth every 6 hours as needed for Pain for up to 7 days. Intended supply: 7 days. Take lowest dose possible to manage pain Max Daily Amount: 4 tablets 25 Yes Louie Arora PA-C   aspirin (BALAJI ASPIRIN) 325 MG tablet Take 1 tablet by mouth in the morning and at bedtime 25  Yes Louie Arora PA-C   albuterol sulfate HFA (PROVENTIL;VENTOLIN;PROAIR) 108 (90 Base) MCG/ACT inhaler Inhale into the lungs   Yes Automatic Reconciliation, Ar   amitriptyline (ELAVIL) 25 MG tablet Take 1 tablet by mouth every morning   Yes Automatic Reconciliation, Ar   carvedilol (COREG) 6.25 MG tablet Take 1 tablet by mouth 2 times daily (with meals)   Yes Automatic Reconciliation, Ar   cetirizine (ZYRTEC) 10 MG tablet Take 1 tablet by mouth daily   Yes Automatic Reconciliation, Ar   cyanocobalamin 1000 MCG/ML injection Inject 1 mL into the muscle every 30 days   Yes Automatic Reconciliation, Ar   donepezil (ARICEPT) 5 MG tablet Take 1 tablet by mouth nightly   Yes Automatic Reconciliation, Ar   DULoxetine (CYMBALTA) 60 MG extended release capsule Take 1 capsule by mouth 2 times daily   Yes Automatic Reconciliation, Ar   escitalopram (LEXAPRO) 20 MG tablet Take 1 tablet by mouth every morning   Yes Automatic Reconciliation,

## 2025-02-19 NOTE — OP NOTE
[facility] TOTAL KNEE ARTHROPLASTY OP NOTE      OPERATIVE REPORT    Patient: Alondra Fink CSN: [id number] SSN: [id number]    YOB: 1950  Age: 74 y.o.  Sex: female      Admit Date: 2/19/2025  Admit Diagnosis: Primary osteoarthritis of right knee [M17.11]    Date of Procedure: 2/19/2025   Preoperative Diagnosis: Pre-Op Diagnosis Codes:      * Primary osteoarthritis of right knee [M17.11]  Postoperative Diagnosis: Post-Op Diagnosis Codes:     * Primary osteoarthritis of right knee [M17.11]    Procedure: RIGHT TOTAL KNEE REPLACEMENT-  MAKOPLASTY \"SPEC POP\": 38649 (CPT®)  Surgeon: EPTROS BARTON MD  Assistant(s):  Louie Arora PA-C  Anesthesia: General   Estimated Blood Loss:<50CC  Specimens: * No specimens in log *   Complications: None  Implants:   Implant Name Type Inv. Item Serial No.  Lot No. LRB No. Used Action   COMPONENT FEM SZ 4 R KNEE CRUCE RET CEMENTLESS BEAD W/ NIKOLAY - [id number]  COMPONENT FEM SZ 4 R KNEE CRUCE RET CEMENTLESS BEAD W/ NIKOLAY  [employer] [id number] Right 1 Implanted   BASEPLATE TIB SZ 5 AP49MM ML74MM KNEE TRITANIUM 4 CRUCFRM - [id number]  BASEPLATE TIB SZ 5 AP49MM ML74MM KNEE TRITANIUM 4 CRUCFRM  [employer] [id number] Right 1 Implanted   COMPONENT PAT [id number] SUPERIOR/INFERIOR KNEE - [id number]  COMPONENT PAT [id number] SUPERIOR/INFERIOR KNEE  [employer] [id number] Right 1 Implanted   INSERT TIB CNDYL STBL 5 9 MM KNEE 5/PK X3 TRIATHLON - [id number]  INSERT TIB CNDYL STBL 5 9 MM KNEE 5/PK X3 TRIATHLON  [employer] [id number] Right 1 Implanted         INDICATIONS: The patient is a 74 y.o., female who has who has been suffering from knee arthritis. She has failed conservative therapy including activity modification, anti-inflammatories and injections. The arthritis is significantly impacting the patient's quality of life. We did discuss the option of total knee arthroplasty with them at length. She

## 2025-02-19 NOTE — PERIOP NOTE
Reviewed PTA medication list with patient/caregiver and patient/caregiver denies any additional medications.     Patient admits to having a responsible adult care for them at home for at least 24 hours after surgery.    Patient encouraged to use gown warming system and informed that using said warming gown to regulate body temperature prior to a procedure has been shown to help reduce the risks of blood clots and infection.    Patient's pharmacy of choice verified and documented in PTA medication section.    Dual skin assessment & fall risk band verification completed with Cecy GRANADOS RN.

## 2025-02-19 NOTE — PROGRESS NOTES
TRANSFER - IN REPORT:    Verbal report received from RN,CRNA on Alondra Fink  being received from OR for routine post-op      Report consisted of patient's Situation, Background, Assessment and   Recommendations(SBAR).     Information from the following report(s) Nurse Handoff Report was reviewed with the receiving nurse.    Opportunity for questions and clarification was provided.      Assessment completed upon patient's arrival to unit and care assumed.

## 2025-02-19 NOTE — PROGRESS NOTES
Physical Therapy Goals:  Pt goals to be met in 1 day:  Pt will be able to perform supine<>sit SBA for transfer ease at home.  Pt will be able to perform sit<>stand SBA for increased ability to transfer at home safely.  Pt will be able to participate in gait training >100' w/ RW, WBAT, GB and CGA/SBA for improved ability in home upon d/c.  Pt will be educated regarding HEP per MD protocol for optimal AROM/strength outcomes.        [x]  Patient has met MD mobilization critieria for d/c home   [x]  HH with 24 hour adult care   []  Benefit from additional acute PT session to address:      PHYSICAL THERAPY EVALUATION    Patient: Alondra Fink (74 y.o. female)  Date: 2/19/2025  Primary Diagnosis: Primary osteoarthritis of right knee [M17.11]  Procedure(s) (LRB):  RIGHT TOTAL KNEE REPLACEMENT-  MAKOPLASTY \"SPEC POP\" (Right) Day of Surgery   Precautions: Fall Risk, Weight Bearing, Surgical Protocols, Right Lower Extremity Weight Bearing: Weight Bearing As Tolerated,  ,  ,  ,  ,  ,    PLOF: used RW for ambulation     ASSESSMENT :  Introduced self to pt and family, ed on role of PT and pt agreeable to PT evaluation.  Based on the objective data described below, the patient presents with decreased mobility in regards to bed mobility, transfers, gait quality, gait tolerance, balance and safety due to R TKA surgery.  Decreased AROM of R knee, dec strength R knee, pain in R knee, decreased sensation of R knee, also impacting functional mobility.  Using numerical pain scale, pt rated pain 5/10 pre/during/post session.  Pt and  ed regarding mobility safety, WB, HEP, ice application/use, elevation, environmental safety and home safe techniques.  Pt sitting in recliner upon arrival.  Pt drowsy but able to follow instruction and participate in activity.  Able to perform sit<>stand w/ CGA.  Safety vc required throughout session to reinforce safety.  Gait training using RW, GB, WBAT and CGA w/ antalgic gait pattern.  Pt

## 2025-02-19 NOTE — ANESTHESIA POSTPROCEDURE EVALUATION
Post-Anesthesia Evaluation & Assessment    Vitals  BP: (!) 133/55  Temp: 98 °F (36.7 °C)  Temp Source: Temporal  Pulse: 69  Respirations: 12  SpO2: 97 %  Height: 156.2 cm (5' 1.5\")  Weight - Scale: 102.3 kg (225 lb 8 oz)  Pain Level: 3    Nausea/Vomiting: Controlled.    Post-operative hydration adequate.    Pain managed.    Mental status & Level of consciousness: alert and oriented x 3    Neurological status: moves all extremities, sensation grossly intact    Pulmonary status: airway patent, adequate oxygenation.    Complications related to anesthesia: none    Patient has met all PACU discharge requirements.      Pa Rosa, DO

## 2025-02-19 NOTE — INTERVAL H&P NOTE
Update History & Physical    The patient's History and Physical was reviewed with the patient and I examined the patient. There was no change. The surgical site was confirmed by the patient and me.     Plan: The risks, benefits, expected outcome, and alternative to the recommended procedure have been discussed with the patient. Patient understands and wants to proceed with the procedure.     Electronically signed by PETROS BARTON MD on 2/19/2025 at 7:21 AM

## 2025-02-19 NOTE — ANESTHESIA PROCEDURE NOTES
Peripheral Block    Patient location during procedure: pre-op  Reason for block: post-op pain management and at surgeon's request  Start time: 2/19/2025 7:57 AM  End time: 2/19/2025 8:02 AM  Staffing  Performed: anesthesiologist   Anesthesiologist: Pa Rosa DO  Performed by: Pa Rosa DO  Authorized by: Pa Rosa DO    Preanesthetic Checklist  Completed: patient identified, IV checked, site marked, risks and benefits discussed, surgical/procedural consents, equipment checked, pre-op evaluation, timeout performed, anesthesia consent given, oxygen available, monitors applied/VS acknowledged, fire risk safety assessment completed and verbalized and blood product R/B/A discussed and consented  Peripheral Block   Patient position: supine (frog leg)  Prep: ChloraPrep  Provider prep: mask and sterile gloves  Patient monitoring: cardiac monitor, continuous pulse ox, frequent blood pressure checks, IV access, oxygen and responsive to questions  Block type: Femoral  Adductor canal  Laterality: right  Injection technique: single-shot  Guidance: ultrasound guided    Needle   Needle type: insulated echogenic nerve stimulator needle   Needle gauge: 21 G  Needle localization: ultrasound guidance  Needle length: 8 cm  Assessment   Injection assessment: negative aspiration for heme, no paresthesia on injection, local visualized surrounding nerve on ultrasound and no intravascular symptoms  Paresthesia pain: none  Slow fractionated injection: yes  Hemodynamics: stable  Outcomes: uncomplicated and patient tolerated procedure well

## 2025-02-19 NOTE — PROGRESS NOTES
TRANSFER - OUT REPORT:    Verbal report given to SASKIA Aden on Alondra Fink  being transferred to [facility] for routine progression of patient care       Report consisted of patient's Situation, Background, Assessment and   Recommendations(SBAR).     Information from the following report(s) Nurse Handoff Report, Adult Overview, Surgery Report, Intake/Output, and MAR was reviewed with the receiving nurse.           Lines:   Peripheral IV 02/19/25 Posterior;Right Hand (Active)   Site Assessment Clean, dry & intact 02/19/25 1037   Line Status Infusing 02/19/25 1037   Line Care Connections checked and tightened 02/19/25 1037   Phlebitis Assessment No symptoms 02/19/25 1037   Infiltration Assessment 0 02/19/25 1037   Alcohol Cap Used No 02/19/25 1037   Dressing Status Clean, dry & intact 02/19/25 1037   Dressing Type Transparent 02/19/25 1037   Dressing Intervention New 02/19/25 1037        Opportunity for questions and clarification was provided.      Patient transported with:  Registered Nurse

## 2025-06-20 ENCOUNTER — HOSPITAL ENCOUNTER (OUTPATIENT)
Facility: HOSPITAL | Age: 75
Discharge: HOME | End: 2025-06-23
Attending: ORTHOPAEDIC SURGERY
Payer: MEDICARE

## 2025-06-20 ENCOUNTER — TRANSCRIBE ORDERS (OUTPATIENT)
Facility: HOSPITAL | Age: 75
End: 2025-06-20

## 2025-06-20 ENCOUNTER — HOSPITAL ENCOUNTER (OUTPATIENT)
Facility: HOSPITAL | Age: 75
Discharge: HOME | End: 2025-06-22
Payer: MEDICARE

## 2025-06-20 ENCOUNTER — HOSPITAL ENCOUNTER (OUTPATIENT)
Facility: HOSPITAL | Age: 75
Discharge: HOME | End: 2025-06-23
Payer: MEDICARE

## 2025-06-20 DIAGNOSIS — M17.12 OSTEOARTHRITIS OF LEFT KNEE, UNSPECIFIED OSTEOARTHRITIS TYPE: Primary | ICD-10-CM

## 2025-06-20 DIAGNOSIS — M17.12 OSTEOARTHRITIS OF LEFT KNEE, UNSPECIFIED OSTEOARTHRITIS TYPE: ICD-10-CM

## 2025-06-20 DIAGNOSIS — M17.12 ARTHRITIS OF LEFT KNEE: ICD-10-CM

## 2025-06-20 LAB
ALBUMIN SERPL-MCNC: 4.2 G/DL (ref 3.4–5)
ALBUMIN/GLOB SERPL: 1.5 (ref 0.8–1.7)
ALP SERPL-CCNC: 89 U/L (ref 45–117)
ALT SERPL-CCNC: 18 U/L (ref 10–35)
ANION GAP SERPL CALC-SCNC: 15 MMOL/L (ref 3–18)
APPEARANCE UR: CLEAR
APTT PPP: 30.5 SEC (ref 23–36.4)
AST SERPL-CCNC: 20 U/L (ref 10–38)
BACTERIA URNS QL MICRO: ABNORMAL /HPF
BASOPHILS # BLD: 0.07 K/UL (ref 0–0.1)
BASOPHILS NFR BLD: 0.6 % (ref 0–2)
BILIRUB SERPL-MCNC: 0.5 MG/DL (ref 0.2–1)
BILIRUB UR QL: NEGATIVE
BUN SERPL-MCNC: 23 MG/DL (ref 6–23)
BUN/CREAT SERPL: 35 (ref 12–20)
CALCIUM SERPL-MCNC: 10 MG/DL (ref 8.5–10.1)
CHLORIDE SERPL-SCNC: 90 MMOL/L (ref 98–107)
CO2 SERPL-SCNC: 24 MMOL/L (ref 21–32)
COLOR UR: YELLOW
CREAT SERPL-MCNC: 0.66 MG/DL (ref 0.6–1.3)
CRP SERPL-MCNC: 0.4 MG/DL (ref 0–5)
DIFFERENTIAL METHOD BLD: ABNORMAL
EKG ATRIAL RATE: 78 BPM
EKG DIAGNOSIS: NORMAL
EKG P AXIS: 74 DEGREES
EKG P-R INTERVAL: 262 MS
EKG Q-T INTERVAL: 400 MS
EKG QRS DURATION: 100 MS
EKG QTC CALCULATION (BAZETT): 456 MS
EKG R AXIS: 58 DEGREES
EKG T AXIS: 57 DEGREES
EKG VENTRICULAR RATE: 78 BPM
EOSINOPHIL # BLD: 0.11 K/UL (ref 0–0.4)
EOSINOPHIL NFR BLD: 0.9 % (ref 0–5)
EPITH CASTS URNS QL MICRO: ABNORMAL /LPF (ref 0–5)
ERYTHROCYTE [DISTWIDTH] IN BLOOD BY AUTOMATED COUNT: 13.5 % (ref 11.6–14.5)
ERYTHROCYTE [SEDIMENTATION RATE] IN BLOOD: 20 MM/HR (ref 0–30)
EST. AVERAGE GLUCOSE BLD GHB EST-MCNC: 99 MG/DL
GLOBULIN SER CALC-MCNC: 2.9 G/DL (ref 2–4)
GLUCOSE SERPL-MCNC: 88 MG/DL (ref 74–108)
GLUCOSE UR STRIP.AUTO-MCNC: NEGATIVE MG/DL
HBA1C MFR BLD: 5.1 % (ref 4.2–5.6)
HCT VFR BLD AUTO: 40.1 % (ref 35–45)
HGB BLD-MCNC: 13.3 G/DL (ref 12–16)
HGB UR QL STRIP: NEGATIVE
IMM GRANULOCYTES # BLD AUTO: 0.07 K/UL (ref 0–0.04)
IMM GRANULOCYTES NFR BLD AUTO: 0.6 % (ref 0–0.5)
INR PPP: 1 (ref 0.9–1.1)
KETONES UR QL STRIP.AUTO: NEGATIVE MG/DL
LEUKOCYTE ESTERASE UR QL STRIP.AUTO: ABNORMAL
LYMPHOCYTES # BLD: 2.29 K/UL (ref 0.9–3.3)
LYMPHOCYTES NFR BLD: 19.7 % (ref 21–52)
MCH RBC QN AUTO: 32 PG (ref 24–34)
MCHC RBC AUTO-ENTMCNC: 33.2 G/DL (ref 31–37)
MCV RBC AUTO: 96.6 FL (ref 78–100)
MONOCYTES # BLD: 0.76 K/UL (ref 0.05–1.2)
MONOCYTES NFR BLD: 6.5 % (ref 3–10)
NEUTS SEG # BLD: 8.31 K/UL (ref 1.8–8)
NEUTS SEG NFR BLD: 71.7 % (ref 40–73)
NITRITE UR QL STRIP.AUTO: NEGATIVE
NRBC # BLD: 0 K/UL (ref 0–0.01)
NRBC BLD-RTO: 0 PER 100 WBC
PH UR STRIP: 6 (ref 5–8)
PLATELET # BLD AUTO: 378 K/UL (ref 135–420)
PMV BLD AUTO: 9.8 FL (ref 9.2–11.8)
POTASSIUM SERPL-SCNC: 5.2 MMOL/L (ref 3.5–5.5)
PROT SERPL-MCNC: 7 G/DL (ref 6.4–8.2)
PROT UR STRIP-MCNC: NEGATIVE MG/DL
PROTHROMBIN TIME: 13.5 SEC (ref 11.9–14.9)
RBC # BLD AUTO: 4.15 M/UL (ref 4.2–5.3)
RBC #/AREA URNS HPF: ABNORMAL /HPF (ref 0–5)
SODIUM SERPL-SCNC: 129 MMOL/L (ref 136–145)
SP GR UR REFRACTOMETRY: 1.01 (ref 1–1.03)
UROBILINOGEN UR QL STRIP.AUTO: 1 EU/DL (ref 0.2–1)
WBC # BLD AUTO: 11.6 K/UL (ref 4.6–13.2)
WBC URNS QL MICRO: ABNORMAL /HPF (ref 0–5)

## 2025-06-20 PROCEDURE — 85610 PROTHROMBIN TIME: CPT

## 2025-06-20 PROCEDURE — 73700 CT LOWER EXTREMITY W/O DYE: CPT

## 2025-06-20 PROCEDURE — 85652 RBC SED RATE AUTOMATED: CPT

## 2025-06-20 PROCEDURE — 85730 THROMBOPLASTIN TIME PARTIAL: CPT

## 2025-06-20 PROCEDURE — 93005 ELECTROCARDIOGRAM TRACING: CPT

## 2025-06-20 PROCEDURE — 36415 COLL VENOUS BLD VENIPUNCTURE: CPT

## 2025-06-20 PROCEDURE — 86140 C-REACTIVE PROTEIN: CPT

## 2025-06-20 PROCEDURE — 81001 URINALYSIS AUTO W/SCOPE: CPT

## 2025-06-20 PROCEDURE — 85025 COMPLETE CBC W/AUTO DIFF WBC: CPT

## 2025-06-20 PROCEDURE — 80053 COMPREHEN METABOLIC PANEL: CPT

## 2025-06-20 PROCEDURE — 83036 HEMOGLOBIN GLYCOSYLATED A1C: CPT

## 2025-06-21 LAB
BACTERIA SPEC CULT: NORMAL
BACTERIA SPEC CULT: NORMAL
SERVICE CMNT-IMP: NORMAL

## 2025-07-02 ENCOUNTER — HOSPITAL ENCOUNTER (OUTPATIENT)
Facility: HOSPITAL | Age: 75
Discharge: HOME | End: 2025-07-05
Payer: MEDICARE

## 2025-07-02 ENCOUNTER — ANESTHESIA EVENT (OUTPATIENT)
Facility: HOSPITAL | Age: 75
End: 2025-07-02
Payer: MEDICARE

## 2025-07-02 DIAGNOSIS — Z01.818 PREOP TESTING: ICD-10-CM

## 2025-07-02 PROCEDURE — 36415 COLL VENOUS BLD VENIPUNCTURE: CPT

## 2025-07-02 PROCEDURE — 87086 URINE CULTURE/COLONY COUNT: CPT

## 2025-07-03 LAB
BACTERIA SPEC CULT: NORMAL
SERVICE CMNT-IMP: NORMAL

## 2025-07-09 ENCOUNTER — APPOINTMENT (OUTPATIENT)
Facility: HOSPITAL | Age: 75
End: 2025-07-09
Attending: ORTHOPAEDIC SURGERY
Payer: MEDICARE

## 2025-07-09 ENCOUNTER — HOSPITAL ENCOUNTER (INPATIENT)
Facility: HOSPITAL | Age: 75
LOS: 2 days | Discharge: HOME HEALTH | End: 2025-07-11
Attending: ORTHOPAEDIC SURGERY | Admitting: ORTHOPAEDIC SURGERY
Payer: MEDICARE

## 2025-07-09 ENCOUNTER — ANESTHESIA (OUTPATIENT)
Facility: HOSPITAL | Age: 75
End: 2025-07-09
Payer: MEDICARE

## 2025-07-09 DIAGNOSIS — I48.0 PAROXYSMAL ATRIAL FIBRILLATION (HCC): ICD-10-CM

## 2025-07-09 DIAGNOSIS — Z96.652 S/P TKR (TOTAL KNEE REPLACEMENT) NOT USING CEMENT, LEFT: Primary | ICD-10-CM

## 2025-07-09 PROBLEM — I48.91 ATRIAL FIBRILLATION WITH RAPID VENTRICULAR RESPONSE (HCC): Status: ACTIVE | Noted: 2025-07-09

## 2025-07-09 PROBLEM — I48.91 ATRIAL FIBRILLATION (HCC): Status: ACTIVE | Noted: 2025-07-09

## 2025-07-09 LAB
ABO + RH BLD: NORMAL
ALBUMIN SERPL-MCNC: 3.3 G/DL (ref 3.4–5)
ALBUMIN/GLOB SERPL: 1.1 (ref 0.8–1.7)
ALP SERPL-CCNC: 137 U/L (ref 45–117)
ALT SERPL-CCNC: 94 U/L (ref 10–35)
ANION GAP SERPL CALC-SCNC: 13 MMOL/L (ref 3–18)
AST SERPL-CCNC: 184 U/L (ref 10–38)
BASOPHILS # BLD: 0.04 K/UL (ref 0–0.1)
BASOPHILS NFR BLD: 0.3 % (ref 0–2)
BILIRUB SERPL-MCNC: 0.4 MG/DL (ref 0.2–1)
BLOOD GROUP ANTIBODIES SERPL: NORMAL
BUN SERPL-MCNC: 12 MG/DL (ref 6–23)
BUN/CREAT SERPL: 16 (ref 12–20)
CALCIUM SERPL-MCNC: 8.8 MG/DL (ref 8.5–10.1)
CHLORIDE SERPL-SCNC: 97 MMOL/L (ref 98–107)
CO2 SERPL-SCNC: 22 MMOL/L (ref 21–32)
CREAT SERPL-MCNC: 0.75 MG/DL (ref 0.6–1.3)
DIFFERENTIAL METHOD BLD: ABNORMAL
EOSINOPHIL # BLD: 0 K/UL (ref 0–0.4)
EOSINOPHIL NFR BLD: 0 % (ref 0–5)
ERYTHROCYTE [DISTWIDTH] IN BLOOD BY AUTOMATED COUNT: 14.6 % (ref 11.6–14.5)
GLOBULIN SER CALC-MCNC: 2.9 G/DL (ref 2–4)
GLUCOSE SERPL-MCNC: 159 MG/DL (ref 74–108)
HCT VFR BLD AUTO: 35.2 % (ref 35–45)
HGB BLD-MCNC: 11.5 G/DL (ref 12–16)
IMM GRANULOCYTES # BLD AUTO: 0.06 K/UL (ref 0–0.04)
IMM GRANULOCYTES NFR BLD AUTO: 0.5 % (ref 0–0.5)
LYMPHOCYTES # BLD: 0.72 K/UL (ref 0.9–3.3)
LYMPHOCYTES NFR BLD: 5.6 % (ref 21–52)
MAGNESIUM SERPL-MCNC: 1.9 MG/DL (ref 1.6–2.6)
MCH RBC QN AUTO: 32 PG (ref 24–34)
MCHC RBC AUTO-ENTMCNC: 32.7 G/DL (ref 31–37)
MCV RBC AUTO: 98.1 FL (ref 78–100)
MONOCYTES # BLD: 0.25 K/UL (ref 0.05–1.2)
MONOCYTES NFR BLD: 1.9 % (ref 3–10)
NEUTS SEG # BLD: 11.88 K/UL (ref 1.8–8)
NEUTS SEG NFR BLD: 91.7 % (ref 40–73)
NRBC # BLD: 0 K/UL (ref 0–0.01)
NRBC BLD-RTO: 0 PER 100 WBC
PLATELET # BLD AUTO: 277 K/UL (ref 135–420)
PMV BLD AUTO: 9.5 FL (ref 9.2–11.8)
POTASSIUM SERPL-SCNC: 4.6 MMOL/L (ref 3.5–5.5)
PROT SERPL-MCNC: 6.2 G/DL (ref 6.4–8.2)
RBC # BLD AUTO: 3.59 M/UL (ref 4.2–5.3)
SODIUM SERPL-SCNC: 132 MMOL/L (ref 136–145)
SPECIMEN EXP DATE BLD: NORMAL
TSH, 3RD GENERATION: 0.69 UIU/ML (ref 0.27–4.2)
WBC # BLD AUTO: 13 K/UL (ref 4.6–13.2)

## 2025-07-09 PROCEDURE — 6360000002 HC RX W HCPCS

## 2025-07-09 PROCEDURE — 97161 PT EVAL LOW COMPLEX 20 MIN: CPT

## 2025-07-09 PROCEDURE — 3600000013 HC SURGERY LEVEL 3 ADDTL 15MIN: Performed by: ORTHOPAEDIC SURGERY

## 2025-07-09 PROCEDURE — 6360000002 HC RX W HCPCS: Performed by: ORTHOPAEDIC SURGERY

## 2025-07-09 PROCEDURE — 3600000003 HC SURGERY LEVEL 3 BASE: Performed by: ORTHOPAEDIC SURGERY

## 2025-07-09 PROCEDURE — 2500000003 HC RX 250 WO HCPCS: Performed by: ORTHOPAEDIC SURGERY

## 2025-07-09 PROCEDURE — 2580000003 HC RX 258: Performed by: ORTHOPAEDIC SURGERY

## 2025-07-09 PROCEDURE — 3700000001 HC ADD 15 MINUTES (ANESTHESIA): Performed by: ORTHOPAEDIC SURGERY

## 2025-07-09 PROCEDURE — 73560 X-RAY EXAM OF KNEE 1 OR 2: CPT

## 2025-07-09 PROCEDURE — 86850 RBC ANTIBODY SCREEN: CPT

## 2025-07-09 PROCEDURE — 93005 ELECTROCARDIOGRAM TRACING: CPT | Performed by: SPECIALIST

## 2025-07-09 PROCEDURE — 2709999900 HC NON-CHARGEABLE SUPPLY: Performed by: ORTHOPAEDIC SURGERY

## 2025-07-09 PROCEDURE — 83735 ASSAY OF MAGNESIUM: CPT

## 2025-07-09 PROCEDURE — 7100000000 HC PACU RECOVERY - FIRST 15 MIN: Performed by: ORTHOPAEDIC SURGERY

## 2025-07-09 PROCEDURE — 94640 AIRWAY INHALATION TREATMENT: CPT

## 2025-07-09 PROCEDURE — 6370000000 HC RX 637 (ALT 250 FOR IP): Performed by: SPECIALIST

## 2025-07-09 PROCEDURE — 64447 NJX AA&/STRD FEMORAL NRV IMG: CPT | Performed by: SPECIALIST

## 2025-07-09 PROCEDURE — 84443 ASSAY THYROID STIM HORMONE: CPT

## 2025-07-09 PROCEDURE — 7100000001 HC PACU RECOVERY - ADDTL 15 MIN: Performed by: ORTHOPAEDIC SURGERY

## 2025-07-09 PROCEDURE — 1100000003 HC PRIVATE W/ TELEMETRY

## 2025-07-09 PROCEDURE — 80053 COMPREHEN METABOLIC PANEL: CPT

## 2025-07-09 PROCEDURE — 2500000003 HC RX 250 WO HCPCS

## 2025-07-09 PROCEDURE — 6370000000 HC RX 637 (ALT 250 FOR IP): Performed by: ORTHOPAEDIC SURGERY

## 2025-07-09 PROCEDURE — 2580000003 HC RX 258: Performed by: SPECIALIST

## 2025-07-09 PROCEDURE — 6360000002 HC RX W HCPCS: Performed by: SPECIALIST

## 2025-07-09 PROCEDURE — 2700000000 HC OXYGEN THERAPY PER DAY

## 2025-07-09 PROCEDURE — 36415 COLL VENOUS BLD VENIPUNCTURE: CPT

## 2025-07-09 PROCEDURE — 6370000000 HC RX 637 (ALT 250 FOR IP): Performed by: INTERNAL MEDICINE

## 2025-07-09 PROCEDURE — 6370000000 HC RX 637 (ALT 250 FOR IP): Performed by: STUDENT IN AN ORGANIZED HEALTH CARE EDUCATION/TRAINING PROGRAM

## 2025-07-09 PROCEDURE — 3700000000 HC ANESTHESIA ATTENDED CARE: Performed by: ORTHOPAEDIC SURGERY

## 2025-07-09 PROCEDURE — C1776 JOINT DEVICE (IMPLANTABLE): HCPCS | Performed by: ORTHOPAEDIC SURGERY

## 2025-07-09 PROCEDURE — 86901 BLOOD TYPING SEROLOGIC RH(D): CPT

## 2025-07-09 PROCEDURE — 85025 COMPLETE CBC W/AUTO DIFF WBC: CPT

## 2025-07-09 PROCEDURE — 0SRD0JA REPLACEMENT OF LEFT KNEE JOINT WITH SYNTHETIC SUBSTITUTE, UNCEMENTED, OPEN APPROACH: ICD-10-PCS | Performed by: ORTHOPAEDIC SURGERY

## 2025-07-09 PROCEDURE — 2720000010 HC SURG SUPPLY STERILE: Performed by: ORTHOPAEDIC SURGERY

## 2025-07-09 PROCEDURE — 86900 BLOOD TYPING SEROLOGIC ABO: CPT

## 2025-07-09 PROCEDURE — 6360000002 HC RX W HCPCS: Performed by: STUDENT IN AN ORGANIZED HEALTH CARE EDUCATION/TRAINING PROGRAM

## 2025-07-09 PROCEDURE — 6370000000 HC RX 637 (ALT 250 FOR IP): Performed by: HOSPITALIST

## 2025-07-09 DEVICE — BASEPLATE TIB SZ 5 AP49MM ML74MM KNEE TRITANIUM 4 CRUCFRM: Type: IMPLANTABLE DEVICE | Site: KNEE | Status: FUNCTIONAL

## 2025-07-09 DEVICE — COMPONENT PAT DIA35MM THK10MM SUPERIOR/INFERIOR KNEE: Type: IMPLANTABLE DEVICE | Site: KNEE | Status: FUNCTIONAL

## 2025-07-09 DEVICE — IMPLANTABLE DEVICE: Type: IMPLANTABLE DEVICE | Site: KNEE | Status: FUNCTIONAL

## 2025-07-09 RX ORDER — MELOXICAM 7.5 MG/1
15 TABLET ORAL DAILY
Status: DISCONTINUED | OUTPATIENT
Start: 2025-07-09 | End: 2025-07-10

## 2025-07-09 RX ORDER — SODIUM CHLORIDE 9 MG/ML
INJECTION, SOLUTION INTRAVENOUS PRN
Status: DISCONTINUED | OUTPATIENT
Start: 2025-07-09 | End: 2025-07-09 | Stop reason: HOSPADM

## 2025-07-09 RX ORDER — SODIUM CHLORIDE 0.9 % (FLUSH) 0.9 %
5-40 SYRINGE (ML) INJECTION EVERY 12 HOURS SCHEDULED
Status: DISCONTINUED | OUTPATIENT
Start: 2025-07-09 | End: 2025-07-09 | Stop reason: HOSPADM

## 2025-07-09 RX ORDER — ROCURONIUM BROMIDE 10 MG/ML
INJECTION, SOLUTION INTRAVENOUS
Status: DISCONTINUED | OUTPATIENT
Start: 2025-07-09 | End: 2025-07-09 | Stop reason: SDUPTHER

## 2025-07-09 RX ORDER — MEMANTINE HYDROCHLORIDE 5 MG/1
10 TABLET ORAL EVERY EVENING
Status: DISCONTINUED | OUTPATIENT
Start: 2025-07-09 | End: 2025-07-09

## 2025-07-09 RX ORDER — SODIUM CHLORIDE 1 G/1
1 TABLET ORAL 2 TIMES DAILY WITH MEALS
Status: DISCONTINUED | OUTPATIENT
Start: 2025-07-09 | End: 2025-07-11

## 2025-07-09 RX ORDER — POTASSIUM CHLORIDE 1500 MG/1
40 TABLET, EXTENDED RELEASE ORAL PRN
Status: DISCONTINUED | OUTPATIENT
Start: 2025-07-09 | End: 2025-07-11 | Stop reason: HOSPADM

## 2025-07-09 RX ORDER — DEXMEDETOMIDINE HYDROCHLORIDE 100 UG/ML
INJECTION, SOLUTION INTRAVENOUS
Status: DISCONTINUED | OUTPATIENT
Start: 2025-07-09 | End: 2025-07-09 | Stop reason: SDUPTHER

## 2025-07-09 RX ORDER — DEXAMETHASONE SODIUM PHOSPHATE 4 MG/ML
INJECTION, SOLUTION INTRA-ARTICULAR; INTRALESIONAL; INTRAMUSCULAR; INTRAVENOUS; SOFT TISSUE
Status: DISCONTINUED | OUTPATIENT
Start: 2025-07-09 | End: 2025-07-09 | Stop reason: SDUPTHER

## 2025-07-09 RX ORDER — MAGNESIUM HYDROXIDE 1200 MG/15ML
LIQUID ORAL CONTINUOUS PRN
Status: COMPLETED | OUTPATIENT
Start: 2025-07-09 | End: 2025-07-09

## 2025-07-09 RX ORDER — SODIUM CHLORIDE 0.9 % (FLUSH) 0.9 %
5-40 SYRINGE (ML) INJECTION PRN
Status: DISCONTINUED | OUTPATIENT
Start: 2025-07-09 | End: 2025-07-09 | Stop reason: HOSPADM

## 2025-07-09 RX ORDER — ROPIVACAINE HYDROCHLORIDE 5 MG/ML
INJECTION, SOLUTION EPIDURAL; INFILTRATION; PERINEURAL
Status: DISCONTINUED | OUTPATIENT
Start: 2025-07-09 | End: 2025-07-09 | Stop reason: SDUPTHER

## 2025-07-09 RX ORDER — ONDANSETRON 4 MG/1
4 TABLET, ORALLY DISINTEGRATING ORAL EVERY 8 HOURS PRN
Status: DISCONTINUED | OUTPATIENT
Start: 2025-07-09 | End: 2025-07-09 | Stop reason: SDUPTHER

## 2025-07-09 RX ORDER — CELECOXIB 100 MG/1
100 CAPSULE ORAL 2 TIMES DAILY
Status: DISCONTINUED | OUTPATIENT
Start: 2025-07-09 | End: 2025-07-11 | Stop reason: HOSPADM

## 2025-07-09 RX ORDER — DROPERIDOL 2.5 MG/ML
0.62 INJECTION, SOLUTION INTRAMUSCULAR; INTRAVENOUS
Status: DISCONTINUED | OUTPATIENT
Start: 2025-07-09 | End: 2025-07-09 | Stop reason: HOSPADM

## 2025-07-09 RX ORDER — LISINOPRIL 5 MG/1
5 TABLET ORAL EVERY MORNING
Status: DISCONTINUED | OUTPATIENT
Start: 2025-07-10 | End: 2025-07-11 | Stop reason: HOSPADM

## 2025-07-09 RX ORDER — OXYCODONE HYDROCHLORIDE 5 MG/1
5 TABLET ORAL EVERY 4 HOURS PRN
Status: DISCONTINUED | OUTPATIENT
Start: 2025-07-09 | End: 2025-07-11 | Stop reason: HOSPADM

## 2025-07-09 RX ORDER — DIPHENHYDRAMINE HYDROCHLORIDE 50 MG/ML
12.5 INJECTION, SOLUTION INTRAMUSCULAR; INTRAVENOUS
Status: DISCONTINUED | OUTPATIENT
Start: 2025-07-09 | End: 2025-07-09 | Stop reason: HOSPADM

## 2025-07-09 RX ORDER — ONDANSETRON 2 MG/ML
4 INJECTION INTRAMUSCULAR; INTRAVENOUS
Status: DISCONTINUED | OUTPATIENT
Start: 2025-07-09 | End: 2025-07-09 | Stop reason: HOSPADM

## 2025-07-09 RX ORDER — ASPIRIN/CALCIUM/MAG/ALUMINUM 325 MG
1 TABLET ORAL 2 TIMES DAILY
Qty: 60 TABLET | Refills: 0 | Status: SHIPPED | OUTPATIENT
Start: 2025-07-09

## 2025-07-09 RX ORDER — DONEPEZIL HYDROCHLORIDE 5 MG/1
5 TABLET, FILM COATED ORAL NIGHTLY
Status: DISCONTINUED | OUTPATIENT
Start: 2025-07-09 | End: 2025-07-11 | Stop reason: HOSPADM

## 2025-07-09 RX ORDER — POTASSIUM CHLORIDE 7.45 MG/ML
10 INJECTION INTRAVENOUS PRN
Status: DISCONTINUED | OUTPATIENT
Start: 2025-07-09 | End: 2025-07-11 | Stop reason: HOSPADM

## 2025-07-09 RX ORDER — SODIUM CHLORIDE 9 MG/ML
INJECTION, SOLUTION INTRAVENOUS PRN
Status: DISCONTINUED | OUTPATIENT
Start: 2025-07-09 | End: 2025-07-09 | Stop reason: SDUPTHER

## 2025-07-09 RX ORDER — DEXMEDETOMIDINE HYDROCHLORIDE 100 UG/ML
INJECTION, SOLUTION INTRAVENOUS
Status: COMPLETED
Start: 2025-07-09 | End: 2025-07-09

## 2025-07-09 RX ORDER — ACETAMINOPHEN 500 MG
1000 TABLET ORAL ONCE
Status: COMPLETED | OUTPATIENT
Start: 2025-07-09 | End: 2025-07-09

## 2025-07-09 RX ORDER — ONDANSETRON 4 MG/1
4 TABLET, ORALLY DISINTEGRATING ORAL EVERY 8 HOURS PRN
Status: DISCONTINUED | OUTPATIENT
Start: 2025-07-09 | End: 2025-07-11 | Stop reason: HOSPADM

## 2025-07-09 RX ORDER — MAGNESIUM SULFATE IN WATER 40 MG/ML
2000 INJECTION, SOLUTION INTRAVENOUS PRN
Status: DISCONTINUED | OUTPATIENT
Start: 2025-07-09 | End: 2025-07-11 | Stop reason: HOSPADM

## 2025-07-09 RX ORDER — HYDROMORPHONE HYDROCHLORIDE 1 MG/ML
0.5 INJECTION, SOLUTION INTRAMUSCULAR; INTRAVENOUS; SUBCUTANEOUS EVERY 5 MIN PRN
Status: DISCONTINUED | OUTPATIENT
Start: 2025-07-09 | End: 2025-07-09 | Stop reason: HOSPADM

## 2025-07-09 RX ORDER — SODIUM CHLORIDE, SODIUM LACTATE, POTASSIUM CHLORIDE, CALCIUM CHLORIDE 600; 310; 30; 20 MG/100ML; MG/100ML; MG/100ML; MG/100ML
INJECTION, SOLUTION INTRAVENOUS CONTINUOUS
Status: DISCONTINUED | OUTPATIENT
Start: 2025-07-09 | End: 2025-07-10

## 2025-07-09 RX ORDER — POLYETHYLENE GLYCOL 3350 17 G/17G
17 POWDER, FOR SOLUTION ORAL DAILY PRN
Status: DISCONTINUED | OUTPATIENT
Start: 2025-07-09 | End: 2025-07-11 | Stop reason: HOSPADM

## 2025-07-09 RX ORDER — OXYCODONE HYDROCHLORIDE 5 MG/1
5 TABLET ORAL
Status: DISCONTINUED | OUTPATIENT
Start: 2025-07-09 | End: 2025-07-09 | Stop reason: HOSPADM

## 2025-07-09 RX ORDER — FENTANYL CITRATE 50 UG/ML
INJECTION, SOLUTION INTRAMUSCULAR; INTRAVENOUS
Status: DISCONTINUED | OUTPATIENT
Start: 2025-07-09 | End: 2025-07-09 | Stop reason: SDUPTHER

## 2025-07-09 RX ORDER — MEMANTINE HYDROCHLORIDE 5 MG/1
10 TABLET ORAL NIGHTLY
Status: DISCONTINUED | OUTPATIENT
Start: 2025-07-09 | End: 2025-07-11 | Stop reason: HOSPADM

## 2025-07-09 RX ORDER — TRANEXAMIC ACID 10 MG/ML
1000 INJECTION, SOLUTION INTRAVENOUS ONCE
Status: DISCONTINUED | OUTPATIENT
Start: 2025-07-09 | End: 2025-07-11 | Stop reason: HOSPADM

## 2025-07-09 RX ORDER — SODIUM CHLORIDE 9 MG/ML
INJECTION, SOLUTION INTRAVENOUS PRN
Status: DISCONTINUED | OUTPATIENT
Start: 2025-07-09 | End: 2025-07-11 | Stop reason: HOSPADM

## 2025-07-09 RX ORDER — ACETAMINOPHEN 325 MG/1
650 TABLET ORAL EVERY 6 HOURS
Status: DISCONTINUED | OUTPATIENT
Start: 2025-07-09 | End: 2025-07-11 | Stop reason: HOSPADM

## 2025-07-09 RX ORDER — MIDAZOLAM HYDROCHLORIDE 1 MG/ML
INJECTION, SOLUTION INTRAMUSCULAR; INTRAVENOUS
Status: DISCONTINUED | OUTPATIENT
Start: 2025-07-09 | End: 2025-07-09 | Stop reason: SDUPTHER

## 2025-07-09 RX ORDER — LABETALOL HYDROCHLORIDE 5 MG/ML
INJECTION, SOLUTION INTRAVENOUS
Status: DISCONTINUED | OUTPATIENT
Start: 2025-07-09 | End: 2025-07-09 | Stop reason: SDUPTHER

## 2025-07-09 RX ORDER — SODIUM CHLORIDE 0.9 % (FLUSH) 0.9 %
5-40 SYRINGE (ML) INJECTION PRN
Status: DISCONTINUED | OUTPATIENT
Start: 2025-07-09 | End: 2025-07-09 | Stop reason: SDUPTHER

## 2025-07-09 RX ORDER — CETIRIZINE HYDROCHLORIDE 10 MG/1
10 TABLET ORAL EVERY EVENING
Status: DISCONTINUED | OUTPATIENT
Start: 2025-07-09 | End: 2025-07-11 | Stop reason: HOSPADM

## 2025-07-09 RX ORDER — ALBUTEROL SULFATE 90 UG/1
2 INHALANT RESPIRATORY (INHALATION) EVERY 6 HOURS PRN
Status: DISCONTINUED | OUTPATIENT
Start: 2025-07-09 | End: 2025-07-11 | Stop reason: HOSPADM

## 2025-07-09 RX ORDER — ESCITALOPRAM OXALATE 10 MG/1
20 TABLET ORAL EVERY MORNING
Status: DISCONTINUED | OUTPATIENT
Start: 2025-07-10 | End: 2025-07-11 | Stop reason: HOSPADM

## 2025-07-09 RX ORDER — LABETALOL HYDROCHLORIDE 5 MG/ML
10 INJECTION, SOLUTION INTRAVENOUS
Status: DISCONTINUED | OUTPATIENT
Start: 2025-07-09 | End: 2025-07-09 | Stop reason: HOSPADM

## 2025-07-09 RX ORDER — MORPHINE SULFATE 4 MG/ML
INJECTION, SOLUTION INTRAMUSCULAR; INTRAVENOUS PRN
Status: DISCONTINUED | OUTPATIENT
Start: 2025-07-09 | End: 2025-07-09 | Stop reason: ALTCHOICE

## 2025-07-09 RX ORDER — GABAPENTIN 400 MG/1
800 CAPSULE ORAL 3 TIMES DAILY
Status: DISCONTINUED | OUTPATIENT
Start: 2025-07-09 | End: 2025-07-11 | Stop reason: HOSPADM

## 2025-07-09 RX ORDER — PANTOPRAZOLE SODIUM 40 MG/1
40 TABLET, DELAYED RELEASE ORAL
Status: DISCONTINUED | OUTPATIENT
Start: 2025-07-10 | End: 2025-07-11 | Stop reason: HOSPADM

## 2025-07-09 RX ORDER — DULOXETIN HYDROCHLORIDE 60 MG/1
60 CAPSULE, DELAYED RELEASE ORAL 2 TIMES DAILY
Status: DISCONTINUED | OUTPATIENT
Start: 2025-07-09 | End: 2025-07-11 | Stop reason: HOSPADM

## 2025-07-09 RX ORDER — PREDNISONE 5 MG/1
5 TABLET ORAL
Status: DISCONTINUED | OUTPATIENT
Start: 2025-07-10 | End: 2025-07-11 | Stop reason: HOSPADM

## 2025-07-09 RX ORDER — SODIUM CHLORIDE 0.9 % (FLUSH) 0.9 %
5-40 SYRINGE (ML) INJECTION PRN
Status: DISCONTINUED | OUTPATIENT
Start: 2025-07-09 | End: 2025-07-11 | Stop reason: HOSPADM

## 2025-07-09 RX ORDER — SODIUM CHLORIDE 0.9 % (FLUSH) 0.9 %
5-40 SYRINGE (ML) INJECTION EVERY 12 HOURS SCHEDULED
Status: DISCONTINUED | OUTPATIENT
Start: 2025-07-09 | End: 2025-07-09 | Stop reason: SDUPTHER

## 2025-07-09 RX ORDER — ONDANSETRON 2 MG/ML
4 INJECTION INTRAMUSCULAR; INTRAVENOUS EVERY 6 HOURS PRN
Status: DISCONTINUED | OUTPATIENT
Start: 2025-07-09 | End: 2025-07-09 | Stop reason: SDUPTHER

## 2025-07-09 RX ORDER — LIDOCAINE HYDROCHLORIDE 20 MG/ML
INJECTION, SOLUTION INTRAVENOUS
Status: DISCONTINUED | OUTPATIENT
Start: 2025-07-09 | End: 2025-07-09 | Stop reason: SDUPTHER

## 2025-07-09 RX ORDER — SODIUM CHLORIDE 0.9 % (FLUSH) 0.9 %
5-40 SYRINGE (ML) INJECTION EVERY 12 HOURS SCHEDULED
Status: DISCONTINUED | OUTPATIENT
Start: 2025-07-09 | End: 2025-07-11 | Stop reason: HOSPADM

## 2025-07-09 RX ORDER — LEVOTHYROXINE SODIUM 75 UG/1
150 TABLET ORAL
Status: DISCONTINUED | OUTPATIENT
Start: 2025-07-10 | End: 2025-07-11 | Stop reason: HOSPADM

## 2025-07-09 RX ORDER — ALENDRONATE SODIUM 35 MG/1
35 TABLET ORAL
Status: DISCONTINUED | OUTPATIENT
Start: 2025-07-09 | End: 2025-07-11 | Stop reason: HOSPADM

## 2025-07-09 RX ORDER — MIDAZOLAM HYDROCHLORIDE 1 MG/ML
INJECTION, SOLUTION INTRAMUSCULAR; INTRAVENOUS
Status: COMPLETED
Start: 2025-07-09 | End: 2025-07-09

## 2025-07-09 RX ORDER — DILTIAZEM HYDROCHLORIDE 120 MG/1
120 CAPSULE, COATED, EXTENDED RELEASE ORAL DAILY
Status: DISCONTINUED | OUTPATIENT
Start: 2025-07-09 | End: 2025-07-10

## 2025-07-09 RX ORDER — FENTANYL CITRATE 50 UG/ML
25 INJECTION, SOLUTION INTRAMUSCULAR; INTRAVENOUS EVERY 5 MIN PRN
Status: DISCONTINUED | OUTPATIENT
Start: 2025-07-09 | End: 2025-07-09 | Stop reason: HOSPADM

## 2025-07-09 RX ORDER — HYDROCODONE BITARTRATE AND ACETAMINOPHEN 5; 325 MG/1; MG/1
1 TABLET ORAL EVERY 6 HOURS PRN
Qty: 28 TABLET | Refills: 0 | Status: SHIPPED | OUTPATIENT
Start: 2025-07-09 | End: 2025-07-16

## 2025-07-09 RX ORDER — CARVEDILOL 3.12 MG/1
6.25 TABLET ORAL 2 TIMES DAILY WITH MEALS
Status: DISCONTINUED | OUTPATIENT
Start: 2025-07-09 | End: 2025-07-11 | Stop reason: HOSPADM

## 2025-07-09 RX ORDER — PROPOFOL 10 MG/ML
INJECTION, EMULSION INTRAVENOUS
Status: DISCONTINUED | OUTPATIENT
Start: 2025-07-09 | End: 2025-07-09 | Stop reason: SDUPTHER

## 2025-07-09 RX ORDER — SODIUM CHLORIDE, SODIUM LACTATE, POTASSIUM CHLORIDE, CALCIUM CHLORIDE 600; 310; 30; 20 MG/100ML; MG/100ML; MG/100ML; MG/100ML
INJECTION, SOLUTION INTRAVENOUS CONTINUOUS
Status: DISCONTINUED | OUTPATIENT
Start: 2025-07-09 | End: 2025-07-09 | Stop reason: HOSPADM

## 2025-07-09 RX ORDER — ACETAMINOPHEN 325 MG/1
650 TABLET ORAL EVERY 6 HOURS PRN
Status: DISCONTINUED | OUTPATIENT
Start: 2025-07-09 | End: 2025-07-11 | Stop reason: HOSPADM

## 2025-07-09 RX ORDER — FOLIC ACID 1 MG/1
3 TABLET ORAL 3 TIMES DAILY
Status: DISCONTINUED | OUTPATIENT
Start: 2025-07-09 | End: 2025-07-11 | Stop reason: HOSPADM

## 2025-07-09 RX ORDER — ACETAMINOPHEN 650 MG/1
650 SUPPOSITORY RECTAL EVERY 6 HOURS PRN
Status: DISCONTINUED | OUTPATIENT
Start: 2025-07-09 | End: 2025-07-11 | Stop reason: HOSPADM

## 2025-07-09 RX ORDER — CYANOCOBALAMIN 1000 UG/ML
1000 INJECTION, SOLUTION INTRAMUSCULAR; SUBCUTANEOUS
Status: DISCONTINUED | OUTPATIENT
Start: 2025-07-18 | End: 2025-07-11 | Stop reason: HOSPADM

## 2025-07-09 RX ORDER — ONDANSETRON 2 MG/ML
INJECTION INTRAMUSCULAR; INTRAVENOUS
Status: DISCONTINUED | OUTPATIENT
Start: 2025-07-09 | End: 2025-07-09 | Stop reason: SDUPTHER

## 2025-07-09 RX ORDER — TRANEXAMIC ACID 10 MG/ML
1000 INJECTION, SOLUTION INTRAVENOUS ONCE
Status: COMPLETED | OUTPATIENT
Start: 2025-07-09 | End: 2025-07-09

## 2025-07-09 RX ORDER — ONDANSETRON 2 MG/ML
4 INJECTION INTRAMUSCULAR; INTRAVENOUS EVERY 6 HOURS PRN
Status: DISCONTINUED | OUTPATIENT
Start: 2025-07-09 | End: 2025-07-11 | Stop reason: HOSPADM

## 2025-07-09 RX ADMIN — TRANEXAMIC ACID 1000 MG: 10 INJECTION, SOLUTION INTRAVENOUS at 11:50

## 2025-07-09 RX ADMIN — ARFORMOTEROL TARTRATE: 15 SOLUTION RESPIRATORY (INHALATION) at 20:15

## 2025-07-09 RX ADMIN — DILTIAZEM HYDROCHLORIDE 120 MG: 120 CAPSULE, EXTENDED RELEASE ORAL at 21:33

## 2025-07-09 RX ADMIN — SODIUM CHLORIDE, SODIUM LACTATE, POTASSIUM CHLORIDE, AND CALCIUM CHLORIDE: .6; .31; .03; .02 INJECTION, SOLUTION INTRAVENOUS at 13:31

## 2025-07-09 RX ADMIN — CETIRIZINE HYDROCHLORIDE 10 MG: 10 TABLET, FILM COATED ORAL at 18:38

## 2025-07-09 RX ADMIN — ACETAMINOPHEN 650 MG: 325 TABLET ORAL at 20:27

## 2025-07-09 RX ADMIN — ALENDRONATE SODIUM 35 MG: 35 TABLET ORAL at 18:48

## 2025-07-09 RX ADMIN — MEMANTINE 10 MG: 5 TABLET ORAL at 20:29

## 2025-07-09 RX ADMIN — LIDOCAINE HYDROCHLORIDE 100 MG: 20 INJECTION, SOLUTION INTRAVENOUS at 10:33

## 2025-07-09 RX ADMIN — Medication 2000 MG: at 10:40

## 2025-07-09 RX ADMIN — FOLIC ACID 3 MG: 1 TABLET ORAL at 16:17

## 2025-07-09 RX ADMIN — GABAPENTIN 800 MG: 400 CAPSULE ORAL at 16:18

## 2025-07-09 RX ADMIN — ACETAMINOPHEN 650 MG: 325 TABLET ORAL at 16:18

## 2025-07-09 RX ADMIN — ACETAMINOPHEN 1000 MG: 500 TABLET ORAL at 08:33

## 2025-07-09 RX ADMIN — DEXAMETHASONE SODIUM PHOSPHATE 4 MG: 4 INJECTION, SOLUTION INTRAMUSCULAR; INTRAVENOUS at 10:40

## 2025-07-09 RX ADMIN — PROPOFOL 150 MG: 10 INJECTION, EMULSION INTRAVENOUS at 10:35

## 2025-07-09 RX ADMIN — LABETALOL HYDROCHLORIDE 5 MG: 5 INJECTION, SOLUTION INTRAVENOUS at 11:26

## 2025-07-09 RX ADMIN — MELOXICAM 15 MG: 7.5 TABLET ORAL at 16:18

## 2025-07-09 RX ADMIN — OXYCODONE 5 MG: 5 TABLET ORAL at 16:17

## 2025-07-09 RX ADMIN — Medication 1 G: at 16:17

## 2025-07-09 RX ADMIN — DONEPEZIL HYDROCHLORIDE 5 MG: 5 TABLET ORAL at 20:29

## 2025-07-09 RX ADMIN — TRANEXAMIC ACID 1000 MG: 10 INJECTION, SOLUTION INTRAVENOUS at 10:40

## 2025-07-09 RX ADMIN — FENTANYL CITRATE 25 MCG: 50 INJECTION INTRAMUSCULAR; INTRAVENOUS at 12:48

## 2025-07-09 RX ADMIN — SUGAMMADEX 200 MG: 100 INJECTION, SOLUTION INTRAVENOUS at 11:58

## 2025-07-09 RX ADMIN — HYDROMORPHONE HYDROCHLORIDE 0.5 MG: 1 INJECTION, SOLUTION INTRAMUSCULAR; INTRAVENOUS; SUBCUTANEOUS at 10:29

## 2025-07-09 RX ADMIN — GABAPENTIN 800 MG: 400 CAPSULE ORAL at 20:28

## 2025-07-09 RX ADMIN — FENTANYL CITRATE 50 MCG: 50 INJECTION INTRAMUSCULAR; INTRAVENOUS at 11:20

## 2025-07-09 RX ADMIN — CELECOXIB 100 MG: 100 CAPSULE ORAL at 20:29

## 2025-07-09 RX ADMIN — ROCURONIUM BROMIDE 50 MG: 10 INJECTION, SOLUTION INTRAVENOUS at 10:36

## 2025-07-09 RX ADMIN — CEFAZOLIN 2000 MG: 10 INJECTION, POWDER, FOR SOLUTION INTRAVENOUS at 18:33

## 2025-07-09 RX ADMIN — SODIUM CHLORIDE, PRESERVATIVE FREE 10 ML: 5 INJECTION INTRAVENOUS at 20:30

## 2025-07-09 RX ADMIN — ROPIVACAINE HYDROCHLORIDE 20 ML: 5 INJECTION, SOLUTION EPIDURAL; INFILTRATION; PERINEURAL at 09:34

## 2025-07-09 RX ADMIN — DULOXETINE HYDROCHLORIDE 60 MG: 60 CAPSULE, DELAYED RELEASE PELLETS ORAL at 20:28

## 2025-07-09 RX ADMIN — CARVEDILOL 6.25 MG: 3.12 TABLET, FILM COATED ORAL at 16:18

## 2025-07-09 RX ADMIN — SODIUM CHLORIDE, SODIUM LACTATE, POTASSIUM CHLORIDE, AND CALCIUM CHLORIDE: 600; 310; 30; 20 INJECTION, SOLUTION INTRAVENOUS at 09:07

## 2025-07-09 RX ADMIN — MIDAZOLAM 2 MG: 1 INJECTION INTRAMUSCULAR; INTRAVENOUS at 09:23

## 2025-07-09 RX ADMIN — FOLIC ACID 3 MG: 1 TABLET ORAL at 20:29

## 2025-07-09 RX ADMIN — SODIUM CHLORIDE, SODIUM LACTATE, POTASSIUM CHLORIDE, AND CALCIUM CHLORIDE: .6; .31; .03; .02 INJECTION, SOLUTION INTRAVENOUS at 16:43

## 2025-07-09 RX ADMIN — DEXMEDETOMIDINE HYDROCHLORIDE 20 MCG: 100 INJECTION, SOLUTION INTRAVENOUS at 09:34

## 2025-07-09 RX ADMIN — SODIUM CHLORIDE, SODIUM LACTATE, POTASSIUM CHLORIDE, AND CALCIUM CHLORIDE: 600; 310; 30; 20 INJECTION, SOLUTION INTRAVENOUS at 11:05

## 2025-07-09 RX ADMIN — HYDROMORPHONE HYDROCHLORIDE 0.5 MG: 1 INJECTION, SOLUTION INTRAMUSCULAR; INTRAVENOUS; SUBCUTANEOUS at 10:46

## 2025-07-09 RX ADMIN — ONDANSETRON HYDROCHLORIDE 4 MG: 2 INJECTION INTRAMUSCULAR; INTRAVENOUS at 11:54

## 2025-07-09 RX ADMIN — FENTANYL CITRATE 50 MCG: 50 INJECTION INTRAMUSCULAR; INTRAVENOUS at 11:13

## 2025-07-09 RX ADMIN — OXYCODONE 5 MG: 5 TABLET ORAL at 20:28

## 2025-07-09 ASSESSMENT — PAIN - FUNCTIONAL ASSESSMENT
PAIN_FUNCTIONAL_ASSESSMENT: ACTIVITIES ARE NOT PREVENTED
PAIN_FUNCTIONAL_ASSESSMENT: PREVENTS OR INTERFERES SOME ACTIVE ACTIVITIES AND ADLS
PAIN_FUNCTIONAL_ASSESSMENT: ACTIVITIES ARE NOT PREVENTED

## 2025-07-09 ASSESSMENT — PAIN SCALES - GENERAL
PAINLEVEL_OUTOF10: 4
PAINLEVEL_OUTOF10: 3
PAINLEVEL_OUTOF10: 0
PAINLEVEL_OUTOF10: 6
PAINLEVEL_OUTOF10: 0
PAINLEVEL_OUTOF10: 3
PAINLEVEL_OUTOF10: 4
PAINLEVEL_OUTOF10: 4
PAINLEVEL_OUTOF10: 0
PAINLEVEL_OUTOF10: 7

## 2025-07-09 ASSESSMENT — PAIN DESCRIPTION - DESCRIPTORS
DESCRIPTORS: ACHING
DESCRIPTORS: SHARP
DESCRIPTORS: ACHING
DESCRIPTORS: ACHING

## 2025-07-09 ASSESSMENT — PAIN DESCRIPTION - LOCATION
LOCATION: KNEE

## 2025-07-09 ASSESSMENT — PAIN DESCRIPTION - FREQUENCY
FREQUENCY: CONTINUOUS

## 2025-07-09 ASSESSMENT — PAIN DESCRIPTION - ONSET
ONSET: ON-GOING

## 2025-07-09 ASSESSMENT — PAIN DESCRIPTION - ORIENTATION
ORIENTATION: LEFT

## 2025-07-09 ASSESSMENT — PAIN DESCRIPTION - PAIN TYPE
TYPE: SURGICAL PAIN
TYPE: SURGICAL PAIN
TYPE: CHRONIC PAIN
TYPE: SURGICAL PAIN

## 2025-07-09 ASSESSMENT — ENCOUNTER SYMPTOMS: SHORTNESS OF BREATH: 0

## 2025-07-09 ASSESSMENT — COPD QUESTIONNAIRES: CAT_SEVERITY: MILD

## 2025-07-09 NOTE — ANESTHESIA POSTPROCEDURE EVALUATION
Department of Anesthesiology  Postprocedure Note    Patient: Alondra Fink  MRN: [id number]  YOB: 1950  Date of evaluation: 7/9/2025    Procedure Summary       Date: 07/09/25 Room / Location: [facility] MAIN 05 / [facility] MAIN OR    Anesthesia Start: 1029 Anesthesia Stop: 1230    Procedure: LEFT TOTAL KNEE REPLACEMENT- MAKOPLASTY \"SPEC POP\" (Left: Knee) Diagnosis:       Primary osteoarthritis of left knee      (Primary osteoarthritis of left knee [M17.12])    Surgeons: J Luis Houser MD Responsible Provider: Miguel A Bangura MD    Anesthesia Type: General, Regional ASA Status: 3            Anesthesia Type: General, Regional    Jacki Phase I: Jacki Score: 9    Jacki Phase II:      Anesthesia Post Evaluation    Level of consciousness: awake  Airway patency: patent  Nausea & Vomiting: no nausea and no vomiting  Cardiovascular status: blood pressure returned to baseline  Respiratory status: acceptable and room air  Hydration status: euvolemic  Comments: Patient went into A-Fib in PACU, BP stable. 12 EKG ordered and reviewed. MD talked with cardiologist and will f/u with consultation.   Pain management: adequate    No notable events documented.

## 2025-07-09 NOTE — ANESTHESIA PRE PROCEDURE
Department of Anesthesiology  Preprocedure Note       Name:  Alondra Fink   Age:  75 y.o.  :  1950                                          MRN:  [id number]         Date:  2025      Surgeon: Surgeon(s):  J Luis Houser MD    Procedure: Procedure(s):  LEFT TOTAL KNEE REPLACEMENT- MAKOPLASTY \"SPEC POP\"    Medications prior to admission:   Prior to Admission medications    Medication Sig Start Date End Date Taking? Authorizing Provider   albuterol sulfate HFA (PROVENTIL;VENTOLIN;PROAIR) 108 (90 Base) MCG/ACT inhaler Inhale 2 puffs into the lungs every 6 hours as needed (cough)   Yes Automatic Reconciliation, Ar   alendronate (FOSAMAX) 35 MG tablet Take 1 tablet by mouth every 7 days Takes on Sun   Yes Automatic Reconciliation, Ar   amitriptyline (ELAVIL) 25 MG tablet Take 1 tablet by mouth every morning Indications: depression   Yes Automatic Reconciliation, Ar   carvedilol (COREG) 6.25 MG tablet Take 1 tablet by mouth 2 times daily (with meals) Indications: HTN   Yes Automatic Reconciliation, Ar   cetirizine (ZYRTEC) 10 MG tablet Take 1 tablet by mouth every evening Indications: allergies   Yes Automatic Reconciliation, Ar   donepezil (ARICEPT) 5 MG tablet Take 1 tablet by mouth nightly Indications: dementia   Yes Automatic Reconciliation, Ar   Fluticasone Furoate-Vilanterol (BREO ELLIPTA) 100-25 MCG/ACT AEPB Inhale 1 puff into the lungs every morning Indications: COPD   Yes Automatic Reconciliation, Ar   folic acid (FOLVITE) 1 MG tablet Take 3 tablets by mouth 3 times daily   Yes Automatic Reconciliation, Ar   gabapentin (NEURONTIN) 800 MG tablet Take 1 tablet by mouth 3 times daily. Indications: neuropathy   Yes Automatic Reconciliation, Ar   levothyroxine (SYNTHROID) 150 MCG tablet Take 1 tablet by mouth every morning (before breakfast) Indications: hypothyroid   Yes Automatic Reconciliation, Ar   lisinopril (PRINIVIL;ZESTRIL) 5 MG tablet Take 1 tablet by mouth every morning Indications: HTN   Yes

## 2025-07-09 NOTE — PROGRESS NOTES
Physical Therapy Goals:  Pt goals to be met in 1-7 days:  Pt will be able to perform supine<>sit CGA for transfer ease at home.  Pt will be able to perform sit<>stand CGA for increased ability to transfer at home safely.  Pt will be able to participate in gait training >50' w/ RW, WBAT, GB and CGA for improved ability in home upon d/c.  Pt will be able to perform stair training step to pattern, B/U rail and CGA to obtain safe entry into home upon d/c.  Pt will be educated regarding HEP per MD protocol for optimal AROM/strength outcomes.        []  Patient has met MD mobilization critieria for d/c home   []  HH with 24 hour adult care   [x]  Benefit from additional acute PT session to address:  transfer training, gait training    PHYSICAL THERAPY EVALUATION    Patient: Alondra Fink (75 y.o. female)  Date: 7/9/2025  Primary Diagnosis: Primary osteoarthritis of left knee [M17.12]  Atrial fibrillation with rapid ventricular response (HCC) [I48.91]  S/P total knee arthroplasty, left [Z96.652]  Atrial fibrillation (HCC) [I48.91]  Procedure(s) (LRB):  LEFT TOTAL KNEE REPLACEMENT- MAKOPLASTY \"SPEC POP\" (Left) * Day of Surgery *   Precautions: Weight Bearing, Surgical Protocols, Fall Risk,  , Left Lower Extremity Weight Bearing: Weight Bearing As Tolerated,  ,  ,  ,  ,    PLOF: Using RW/rollator for ambulation;pt recently finished OPT for R TKA and dizziness    ASSESSMENT :  Introduced self to pt, ed on role of PT and pt agreeable to PT evaluation.  Pt c/o not eating yet today and fatigue.  Based on the objective data described below, the patient presents with decreased mobility in regards to bed mobility, transfers, gait quality, gait tolerance, balance, stair negotiation and safety due to L TKA surgery.  Decreased AROM of L knee, dec strength L knee, pain in L knee, decreased sensation of L knee, recent Afib onset also impacting functional mobility.  Using numerical pain scale, pt rated pain 9 pre/during/post session  recommendations to determine the most appropriate discharge setting. Patient's social support, diagnosis, medical stability, and prior level of function should also be taken into consideration.     SUBJECTIVE:   Patient stated “I just received some pain medicine but I really want food.”    OBJECTIVE DATA SUMMARY:     Past Medical History:   Diagnosis Date    Arthritis 2015    left knee, scheduled for surgery with Dr. Houser on 7/9/2025    Back pain 2002    degenertive    Balance problem 2019    uses walker; gets dizzy    Bruises easily 1974    Cancer ([facility]) 2022    top of head; basal cell    Cataract 2014    had them removed    Chronic bronchitis ([facility]) 1995    has inhalers    Chronic fatigue syndrome 1985    with fibromyalgia    Chronic obstructive pulmonary disease ([facility]) 1995    mild    COVID-19 virus infection 02/2021    Edema of both legs 2019    Exercise tolerance finding 06/30/2025    patient can climb a small hill without CP; she has SOB due to COPD; legs will not allow her to climb stairs.    Falls frequently 02/2018    6 times in the last 4 months    Fibroids 1994    Fibromyalgia 1985    GERD (gastroesophageal reflux disease) 2005    on med    Hiatal hernia 1965    Hypertension 2010    on med, managed by PCP    IBS (irritable bowel syndrome)     Irregular heart beat 2021    palpitations- cardiologist stated everything ok, managed by Cardiologist Dr. Baldwin LV 2/2025    Migraines 2021    not on meds,    Neck pain 2015    Obesity     Osteoporosis     on meds    Psychiatric disorder 2015    DEPRESSION; on meds    PUD (peptic ulcer disease) 1998    RA (rheumatoid arthritis) ([facility]) 1985    on meds, manged by rheumatologist Dr. Rosario LV 6/2025    Sarcoidosis of lung 2011    sees neurologist Rissa Quintanilla with [facility] and Dr. Ortega LV 6/2025    Short-term memory loss 2022    on meds , managed by PCP    Sleep apnea 2015    uses CPAP ; advised to bring DOS    SOB (shortness of breath) 1995    due o COPD    Spinal

## 2025-07-09 NOTE — PERIOP NOTE
TRANSFER - OUT REPORT:    Verbal report given to Hugo KRUGER  on Alondra Fink  being transferred to [facility]  for routine progression of patient care       Report consisted of patient's Situation, Background, Assessment and   Recommendations(SBAR).     Information from the following report(s) Surgery Report, Intake/Output, MAR, and Recent Results was reviewed with the receiving nurse.           Lines:   Peripheral IV 07/09/25 Posterior Hand (Active)   Site Assessment Clean, dry & intact 07/09/25 1432   Line Status Infusing 07/09/25 1432   Line Care Connections checked and tightened 07/09/25 1432   Phlebitis Assessment No symptoms 07/09/25 1432   Infiltration Assessment 0 07/09/25 1432   Alcohol Cap Used No 07/09/25 0906   Dressing Status Clean, dry & intact 07/09/25 1432   Dressing Type Transparent 07/09/25 1432        Opportunity for questions and clarification was provided.      Patient transported with:  Registered Nurse

## 2025-07-09 NOTE — H&P
[facility]  History and Physical Exam    Patient: Alondra Fink MRN: [id number]  SSN: [id number]    YOB: 1950  Age: 75 y.o.  Sex: female      Subjective:      Chief Complaint: left knee pain    History of Present Illness:  Patient complains of knee pain on the affected side and difficulty ambulating. Pain is increased with increasing activity. Pain is increased with weight bearing. Pain interferes with activities of daily living. Patient has noticed decreased range of motion    Past Medical History:   Diagnosis Date    Arthritis 2015    left knee, scheduled for surgery with Dr. Houser on 7/9/2025    Back pain 2002    degenertive    Balance problem 2019    uses walker; gets dizzy    Bruises easily 1974    Cancer ([facility]) 2022    top of head; basal cell    Cataract 2014    had them removed    Chronic bronchitis ([facility]) 1995    has inhalers    Chronic fatigue syndrome 1985    with fibromyalgia    Chronic obstructive pulmonary disease ([facility]) 1995    mild    COVID-19 virus infection 02/2021    Edema of both legs 2019    Exercise tolerance finding 06/30/2025    patient can climb a small hill without CP; she has SOB due to COPD; legs will not allow her to climb stairs.    Falls frequently 02/2018    6 times in the last 4 months    Fibroids 1994    Fibromyalgia 1985    GERD (gastroesophageal reflux disease) 2005    on med    Hiatal hernia 1965    Hypertension 2010    on med, managed by PCP    IBS (irritable bowel syndrome)     Irregular heart beat 2021    palpitations- cardiologist stated everything ok, managed by Cardiologist Dr. Denny GUNTER 2/2025    Migraines 2021    not on meds,    Neck pain 2015    Obesity     Osteoporosis     on meds    Psychiatric disorder 2015    DEPRESSION; on meds    PUD (peptic ulcer disease) 1998    RA (rheumatoid arthritis) ([facility]) 1985    on meds, manged by rheumatologist Dr. Abraham GUNTER 6/2025    Sarcoidosis of lung 2011    sees neurologist Rissa  Dwight Walsh and Dr. Ortega  6/2025    Short-term memory loss 2022    on meds , managed by PCP    Sleep apnea 2015    uses CPAP ; advised to bring DOS    SOB (shortness of breath) 1995    due o COPD    Spinal stenosis 2002    Thyroid disease 1965    hypothyroid    Tremors of nervous system     not any more was due to medications she was taking    Urinary incontinence 2023    Wears prescription eyeglasses      Past Surgical History:   Procedure Laterality Date    APPENDECTOMY  1968    BREAST LUMPECTOMY      X 6    CATARACT REMOVAL Bilateral 08/2014    CERVICAL FUSION  03/2017    CHOLECYSTECTOMY  1995    CT BIOPSY DEEP BONE PERCUTANEOUS  10/26/2018    CT BIOPSY PERCUTANEOUS DEEP BONE 10/26/2018 [facility] AMB HISTORICAL    GASTRIC BYPASS SURGERY  1978    HYSTERECTOMY (CERVIX STATUS UNKNOWN)  1983    LUMBAR FUSION  12/2012    ORTHOPEDIC SURGERY  2012    L1-L5    PICC INSERTION VASCULAR ACCESS TEAM  09/20/2021         THORACIC FUSION  2001    T1-T8    TONSILLECTOMY  2012    TOTAL KNEE ARTHROPLASTY Right 2/19/2025    RIGHT TOTAL KNEE REPLACEMENT-  MAKOPLASTY \"SPEC POP\" performed by J Luis Houser MD at [facility] MAIN OR     Social History     Occupational History    Not on file   Tobacco Use    Smoking status: Never    Smokeless tobacco: Never   Vaping Use    Vaping status: Never Used   Substance and Sexual Activity    Alcohol use: Yes     Alcohol/week: 1.0 standard drink of alcohol     Types: 1 Glasses of wine per week     Comment: occas    Drug use: Never    Sexual activity: Defer     Prior to Admission medications    Medication Sig Start Date End Date Taking? Authorizing Provider   albuterol sulfate HFA (PROVENTIL;VENTOLIN;PROAIR) 108 (90 Base) MCG/ACT inhaler Inhale 2 puffs into the lungs every 6 hours as needed (cough)   Yes Automatic Reconciliation, Ar   alendronate (FOSAMAX) 35 MG tablet Take 1 tablet by mouth every 7 days Takes on Sun   Yes Automatic Reconciliation, Ar   amitriptyline (ELAVIL) 25 MG tablet Take

## 2025-07-09 NOTE — PERIOP NOTE
TRANSFER - IN REPORT:    Verbal report received from OR Nurse and CRNA on Alondra Fink  being received from OR for routine post-op      Report consisted of patient's Situation, Background, Assessment and   Recommendations(SBAR).     Information from the following report(s) Nurse Handoff Report, Adult Overview, Surgery Report, Intake/Output, MAR, and Recent Results was reviewed with the receiving nurse.    Opportunity for questions and clarification was provided.      Assessment completed upon patient's arrival to unit and care assumed.

## 2025-07-09 NOTE — OP NOTE
[facility] TOTAL KNEE ARTHROPLASTY OP NOTE      OPERATIVE REPORT    Patient: Alondra Fink CSN: [id number] [id number]: [id number]    YOB: 1950  Age: 75 y.o.  Sex: female      Admit Date: 7/9/2025  Admit Diagnosis: Primary osteoarthritis of left knee [M17.12]    Date of Procedure: 7/9/2025   Preoperative Diagnosis: Pre-Op Diagnosis Codes:      * Primary osteoarthritis of left knee [M17.12]  Postoperative Diagnosis: Post-Op Diagnosis Codes:     * Primary osteoarthritis of left knee [M17.12]    Procedure: LEFT TOTAL KNEE REPLACEMENT- MAKOPLASTY \"SPEC POP\": 35496 (CPT®)  Surgeon: PETROS BARTON MD  Assistant(s):  Louie Arora PA-C  Anesthesia: General   Estimated Blood Loss:<50CC  Specimens: * No specimens in log *   Complications: None  Implants:   Implant Name Type Inv. Item Serial No.  Lot No. LRB No. Used Action   COMPONENT PAT [id number] SUPERIOR/INFERIOR KNEE - [id number]  COMPONENT PAT [id number] SUPERIOR/INFERIOR KNEE  [employer] [id number] Left 1 Implanted   INSERT TIB BEAR SZ 5 THK9MM KNEE X3 CRUCE RET TRIATHLON - [id number]  INSERT TIB BEAR SZ 5 THK9MM KNEE X3 CRUCE RET TRIATHLON  [employer] OVIA [id number] Left 1 Implanted   BASEPLATE TIB SZ 5 AP49MM ML74MM KNEE TRITANIUM 4 CRUCFRM - [id number]  BASEPLATE TIB SZ 5 AP49MM ML74MM KNEE TRITANIUM 4 CRUCFRM  [employer] [id number] Left 1 Implanted   COMPONENT FEM SZ 4 L KNEE CRUCE RET CEMENTLESS BEAD W/ NIKOLAY - [id number]  COMPONENT FEM SZ 4 L KNEE CRUCE RET CEMENTLESS BEAD W/ NIKOLAY  [employer] [id number] Left 1 Implanted         INDICATIONS: The patient is a 75 y.o., female who has who has been suffering from knee arthritis. She has failed conservative therapy including activity modification, anti-inflammatories and injections. The arthritis is significantly impacting the patient's quality of life. We did discuss the option of total knee arthroplasty with them at length. She

## 2025-07-09 NOTE — PROGRESS NOTES
ANESTHESIA    Uneventful procedure  EKG ordered for questionable new onset AF and it was confirmed.  She is totally hemodynamically stable and pain free.  Although I see cardiology notes that mention atrial arrhythmias and palpitations, I do not find mention of AF.  As such, this is new onset AF and merits treatment/investigation.  I spoke with surgeon and will engage hospitalist for ongoing management.

## 2025-07-09 NOTE — PERIOP NOTE
Hugo to review sbar. Room assignment is 350. Family updated on patient progress and room assignment.

## 2025-07-09 NOTE — DISCHARGE INSTRUCTIONS
Total Knee Arthroplasty Discharge Instructions   J Luis Houser M.D.      Please take the time to review the following instructions before you leave the hospital and use them as guidelines during your recovery from surgery. If you have any questions you may contact my office at [phone].     Wound Care / Dressing Changes:     Two days after your surgery date you should remove your dressing. A big, bulky dressing isn't necessary as long as there isn't any drainage from the incisions. If there is drainage you can put a band-aid, primapore, or mepilex dressing over the incision and change it daily until drainage stops. It isn't necessary to apply antibiotic ointment to your incisions. If you have glue over your incision do not peel it off.  If you have steri-strips over your incision they will start to peel off in 7-10 days. They don't need to be removed prior to that. When they begin to peel off you can remove them. They should all be removed by 14 days from you surgery. Keep a towel or gauze in any skin folds that may hang over the incision so that it stays dry.    Showering / Bathing:     You may only shower. You may shower if there is no drainage from your incisions. Your dressing may be removed for showering. You may get your incisions wet in the shower. Don't vigorously scrub the area where your incisions are. Apply a clean, dry dressing after drying off the area of your incisions. Don't take a tub bath, get in a swimming pool or Jacuzzi until the incisions are completely healed. Do not soak your incisions under water.     Weight Bearing Status / Braces:     ___X__ Weight bearing as tolerated. Use crutches, walker, or cane as needed for support. You may move your joints as much as tolerated.     _____  \"Toe Touch\" weight bearing. Don't bear weight on the leg you had operated on. Use your toes only to steady yourself as you ambulate with crutches or a walker.     _____ Avoid extreme hip extension with  external rotation.     Home Health:    Home health has been arranged for skilled nursing visits and physical therapy.  Your home health has been set up through____________ . If no one from the agency calls you on the day after you arrive home, please contact them at the number provided at discharge. Physical therapy for gait training and strengthening. Continue therapeutic exercises.     Physical Therapy:       Begin In-Home Physical Therapy 3 times a week to work on gait training, range of motion, strengthening, and weight bearing exercises as tolerable.    Continue to use your walker or cane when walking. You may progress from the walker to a cane to full weight bearing as tolerable.    Ice / Elevation:     Continue ice and elevation as needed for pain and swelling.     Diet:     Resume your pre hospital diet. If you have excessive nausea or vomiting call your doctor's office.     Medication:     You will be given a prescription for pain medication when you are discharged for the hospital. Take the medication as needed according to the directions on the prescription bottle. Possible side effects of the medication include dizziness, headache, nausea, vomiting, constipation and urinary retention. If you experience any of these side effects call the office so that we can assist you in relieving them. Discontinue the use of the pain medication if you develop itching, rash, shortness of breath or difficulties swallowing. If these symptoms become severe or aren't relieved by discontinuing the medication you should seek immediate medical attention.     Refills of pain medication are authorized during office hours only. (8am - 5pm Mon. thru Fri.)     You should take Asprin twice a day for 4 weeks from the date of your surgery, starting 8-10 hours after surgery. This will help to prevent blood clots from forming in your legs.  You may resume the medication you were taking prior to your surgery. Pain medication may change

## 2025-07-09 NOTE — PERIOP NOTE
Reviewed PTA medication list with patient/caregiver and patient/caregiver denies any additional medications.     Patient admits to having a responsible adult care for them at home for at least 24 hours after surgery.    Patient encouraged to use gown warming system and informed that using said warming gown to regulate body temperature prior to a procedure has been shown to help reduce the risks of blood clots and infection.    Patient's pharmacy of choice verified and documented in PTA medication section.    Dual skin assessment & fall risk band verification completed with JUAN Valdivia RN.

## 2025-07-09 NOTE — CONSULTS
[facility] Consult Note      Patient: Alondra Fink MRN: [id number]  SSN: [id number]    YOB: 1950  Age: 75 y.o.  Sex: female    Date of Consultation: 7/9/2025    Reason for Consultation: Atrial fibrillation with RVR, palpitation    HPI:  I was asked by Dr. Bangura to see this pleasant patient for atrial fibrillation with RVR. Alondra Fink is a 75-year-old pleasant patient who underwent knee surgery today without any problem postoperatively patient developed atrial fibrillation with RVR and cardiology consult was called.  Patient's cardiac history is significant for truncal obesity, hypertension, sleep apnea, chronic fatigue syndrome and also patient's sister also have history of atrial fibrillation.  Patient denied any chest pain or shortness of breath  Patient is feeling mild palpitation otherwise main issue is pain at the site of surgery               Past Medical History:   Diagnosis Date    Arthritis 2015    left knee, scheduled for surgery with Dr. Houser on 7/9/2025    Back pain 2002    degenertive    Balance problem 2019    uses walker; gets dizzy    Bruises easily 1974    Cancer (HCC) 2022    top of head; basal cell    Cataract 2014    had them removed    Chronic bronchitis (HCC) 1995    has inhalers    Chronic fatigue syndrome 1985    with fibromyalgia    Chronic obstructive pulmonary disease (HCC) 1995    mild    COVID-19 virus infection 02/2021    Edema of both legs 2019    Exercise tolerance finding 06/30/2025    patient can climb a small hill without CP; she has SOB due to COPD; legs will not allow her to climb stairs.    Falls frequently 02/2018    6 times in the last 4 months    Fibroids 1994    Fibromyalgia 1985    GERD (gastroesophageal reflux disease) 2005    on med    Hiatal hernia 1965    Hypertension 2010    on med, managed by PCP    IBS (irritable bowel syndrome)     Irregular heart beat 2021    palpitations- cardiologist stated everything ok, managed by Cardiologist Dr. Denny GUNTER

## 2025-07-09 NOTE — ANESTHESIA PROCEDURE NOTES
Peripheral Block    Patient location during procedure: pre-op  Reason for block: post-op pain management  Start time: 7/9/2025 9:23 AM  End time: 7/9/2025 9:34 AM  Staffing  Performed: other anesthesia staff   Anesthesiologist: Miguel A Bangura MD  Other anesthesia staff: Melvin Ugarte RN  Performed by: Melvin Ugarte RN  Authorized by: Miguel A Bangura MD    Preanesthetic Checklist  Completed: patient identified, IV checked, site marked, risks and benefits discussed, surgical/procedural consents, equipment checked, pre-op evaluation, timeout performed, anesthesia consent given, oxygen available and monitors applied/VS acknowledged  Peripheral Block   Patient position: supine  Prep: ChloraPrep  Provider prep: mask and sterile gloves  Patient monitoring: cardiac monitor, continuous pulse ox, frequent blood pressure checks, oxygen, IV access, responsive to questions and continuous capnometry  Block type: Femoral  Adductor canal  Laterality: left  Injection technique: single-shot  Guidance: ultrasound guided    Needle   Needle type: insulated echogenic nerve stimulator needle   Needle gauge: 20 G  Needle localization: ultrasound guidance  Test dose: negative  Needle length: 10 cm  Assessment   Injection assessment: negative aspiration for heme, no paresthesia on injection, local visualized surrounding nerve on ultrasound and no intravascular symptoms  Paresthesia pain: none  Slow fractionated injection: yes  Hemodynamics: stable  Outcomes: uncomplicated and patient tolerated procedure well

## 2025-07-10 LAB
ALBUMIN SERPL-MCNC: 3.4 G/DL (ref 3.4–5)
ALBUMIN/GLOB SERPL: 1.3 (ref 0.8–1.7)
ALP SERPL-CCNC: 127 U/L (ref 45–117)
ALT SERPL-CCNC: 79 U/L (ref 10–35)
ANION GAP SERPL CALC-SCNC: 13 MMOL/L (ref 3–18)
APTT PPP: 28 SEC (ref 21.7–34.2)
APTT PPP: 54.9 SEC (ref 21.7–34.2)
AST SERPL-CCNC: 80 U/L (ref 10–38)
BASOPHILS # BLD: 0.03 K/UL (ref 0–0.1)
BASOPHILS NFR BLD: 0.2 % (ref 0–2)
BILIRUB SERPL-MCNC: 0.4 MG/DL (ref 0.2–1)
BUN SERPL-MCNC: 12 MG/DL (ref 6–23)
BUN/CREAT SERPL: 16 (ref 12–20)
CALCIUM SERPL-MCNC: 8.5 MG/DL (ref 8.5–10.1)
CHLORIDE SERPL-SCNC: 94 MMOL/L (ref 98–107)
CO2 SERPL-SCNC: 23 MMOL/L (ref 21–32)
CREAT SERPL-MCNC: 0.75 MG/DL (ref 0.6–1.3)
DIFFERENTIAL METHOD BLD: ABNORMAL
EKG DIAGNOSIS: NORMAL
EKG Q-T INTERVAL: 372 MS
EKG QRS DURATION: 140 MS
EKG QTC CALCULATION (BAZETT): 517 MS
EKG R AXIS: 49 DEGREES
EKG T AXIS: 2 DEGREES
EKG VENTRICULAR RATE: 116 BPM
EOSINOPHIL # BLD: 0.02 K/UL (ref 0–0.4)
EOSINOPHIL NFR BLD: 0.1 % (ref 0–5)
ERYTHROCYTE [DISTWIDTH] IN BLOOD BY AUTOMATED COUNT: 14.6 % (ref 11.6–14.5)
GLOBULIN SER CALC-MCNC: 2.6 G/DL (ref 2–4)
GLUCOSE SERPL-MCNC: 100 MG/DL (ref 74–108)
HCT VFR BLD AUTO: 34.1 % (ref 35–45)
HGB BLD-MCNC: 11.1 G/DL (ref 12–16)
IMM GRANULOCYTES # BLD AUTO: 0.1 K/UL (ref 0–0.04)
IMM GRANULOCYTES NFR BLD AUTO: 0.6 % (ref 0–0.5)
LYMPHOCYTES # BLD: 2.39 K/UL (ref 0.9–3.3)
LYMPHOCYTES NFR BLD: 14.9 % (ref 21–52)
MAGNESIUM SERPL-MCNC: 1.9 MG/DL (ref 1.6–2.6)
MCH RBC QN AUTO: 32.5 PG (ref 24–34)
MCHC RBC AUTO-ENTMCNC: 32.6 G/DL (ref 31–37)
MCV RBC AUTO: 99.7 FL (ref 78–100)
MONOCYTES # BLD: 1.13 K/UL (ref 0.05–1.2)
MONOCYTES NFR BLD: 7 % (ref 3–10)
NEUTS SEG # BLD: 12.4 K/UL (ref 1.8–8)
NEUTS SEG NFR BLD: 77.2 % (ref 40–73)
NRBC # BLD: 0 K/UL (ref 0–0.01)
NRBC BLD-RTO: 0 PER 100 WBC
PLATELET # BLD AUTO: 257 K/UL (ref 135–420)
PMV BLD AUTO: 9.8 FL (ref 9.2–11.8)
POTASSIUM SERPL-SCNC: 4.9 MMOL/L (ref 3.5–5.5)
PROT SERPL-MCNC: 5.9 G/DL (ref 6.4–8.2)
RBC # BLD AUTO: 3.42 M/UL (ref 4.2–5.3)
SODIUM SERPL-SCNC: 130 MMOL/L (ref 136–145)
WBC # BLD AUTO: 16.1 K/UL (ref 4.6–13.2)

## 2025-07-10 PROCEDURE — 85730 THROMBOPLASTIN TIME PARTIAL: CPT

## 2025-07-10 PROCEDURE — 97116 GAIT TRAINING THERAPY: CPT

## 2025-07-10 PROCEDURE — 6360000002 HC RX W HCPCS: Performed by: ORTHOPAEDIC SURGERY

## 2025-07-10 PROCEDURE — 85025 COMPLETE CBC W/AUTO DIFF WBC: CPT

## 2025-07-10 PROCEDURE — 97165 OT EVAL LOW COMPLEX 30 MIN: CPT

## 2025-07-10 PROCEDURE — 6370000000 HC RX 637 (ALT 250 FOR IP): Performed by: ORTHOPAEDIC SURGERY

## 2025-07-10 PROCEDURE — 83735 ASSAY OF MAGNESIUM: CPT

## 2025-07-10 PROCEDURE — 6360000002 HC RX W HCPCS: Performed by: STUDENT IN AN ORGANIZED HEALTH CARE EDUCATION/TRAINING PROGRAM

## 2025-07-10 PROCEDURE — 2500000003 HC RX 250 WO HCPCS: Performed by: ORTHOPAEDIC SURGERY

## 2025-07-10 PROCEDURE — 1100000003 HC PRIVATE W/ TELEMETRY

## 2025-07-10 PROCEDURE — 6370000000 HC RX 637 (ALT 250 FOR IP): Performed by: STUDENT IN AN ORGANIZED HEALTH CARE EDUCATION/TRAINING PROGRAM

## 2025-07-10 PROCEDURE — 6370000000 HC RX 637 (ALT 250 FOR IP): Performed by: HOSPITALIST

## 2025-07-10 PROCEDURE — 80053 COMPREHEN METABOLIC PANEL: CPT

## 2025-07-10 PROCEDURE — 36415 COLL VENOUS BLD VENIPUNCTURE: CPT

## 2025-07-10 PROCEDURE — 6360000002 HC RX W HCPCS: Performed by: HOSPITALIST

## 2025-07-10 PROCEDURE — 97535 SELF CARE MNGMENT TRAINING: CPT

## 2025-07-10 PROCEDURE — 6370000000 HC RX 637 (ALT 250 FOR IP): Performed by: INTERNAL MEDICINE

## 2025-07-10 PROCEDURE — 97530 THERAPEUTIC ACTIVITIES: CPT

## 2025-07-10 PROCEDURE — 94640 AIRWAY INHALATION TREATMENT: CPT

## 2025-07-10 RX ORDER — HEPARIN SODIUM 1000 [USP'U]/ML
2000 INJECTION, SOLUTION INTRAVENOUS; SUBCUTANEOUS PRN
Status: DISCONTINUED | OUTPATIENT
Start: 2025-07-10 | End: 2025-07-10

## 2025-07-10 RX ORDER — HEPARIN SODIUM 1000 [USP'U]/ML
4000 INJECTION, SOLUTION INTRAVENOUS; SUBCUTANEOUS ONCE
Status: COMPLETED | OUTPATIENT
Start: 2025-07-10 | End: 2025-07-10

## 2025-07-10 RX ORDER — HEPARIN SODIUM 1000 [USP'U]/ML
4000 INJECTION, SOLUTION INTRAVENOUS; SUBCUTANEOUS PRN
Status: DISCONTINUED | OUTPATIENT
Start: 2025-07-10 | End: 2025-07-10

## 2025-07-10 RX ORDER — DILTIAZEM HYDROCHLORIDE 240 MG/1
240 CAPSULE, COATED, EXTENDED RELEASE ORAL DAILY
Status: DISCONTINUED | OUTPATIENT
Start: 2025-07-11 | End: 2025-07-10

## 2025-07-10 RX ORDER — HEPARIN SODIUM 10000 [USP'U]/100ML
5-30 INJECTION, SOLUTION INTRAVENOUS CONTINUOUS
Status: DISCONTINUED | OUTPATIENT
Start: 2025-07-10 | End: 2025-07-10

## 2025-07-10 RX ORDER — DILTIAZEM HYDROCHLORIDE 240 MG/1
240 CAPSULE, COATED, EXTENDED RELEASE ORAL DAILY
Status: DISCONTINUED | OUTPATIENT
Start: 2025-07-11 | End: 2025-07-11 | Stop reason: HOSPADM

## 2025-07-10 RX ADMIN — LISINOPRIL 5 MG: 5 TABLET ORAL at 08:53

## 2025-07-10 RX ADMIN — GABAPENTIN 800 MG: 400 CAPSULE ORAL at 08:50

## 2025-07-10 RX ADMIN — PANTOPRAZOLE SODIUM 40 MG: 40 TABLET, DELAYED RELEASE ORAL at 06:12

## 2025-07-10 RX ADMIN — DONEPEZIL HYDROCHLORIDE 5 MG: 5 TABLET ORAL at 20:37

## 2025-07-10 RX ADMIN — CELECOXIB 100 MG: 100 CAPSULE ORAL at 08:48

## 2025-07-10 RX ADMIN — LEVOTHYROXINE SODIUM 150 MCG: 0.07 TABLET ORAL at 06:12

## 2025-07-10 RX ADMIN — ESCITALOPRAM OXALATE 20 MG: 10 TABLET ORAL at 08:48

## 2025-07-10 RX ADMIN — OXYCODONE 5 MG: 5 TABLET ORAL at 15:21

## 2025-07-10 RX ADMIN — ACETAMINOPHEN 650 MG: 325 TABLET ORAL at 03:24

## 2025-07-10 RX ADMIN — FOLIC ACID 3 MG: 1 TABLET ORAL at 20:37

## 2025-07-10 RX ADMIN — GABAPENTIN 800 MG: 400 CAPSULE ORAL at 14:53

## 2025-07-10 RX ADMIN — HEPARIN SODIUM 9 UNITS/KG/HR: 10000 INJECTION, SOLUTION INTRAVENOUS at 12:44

## 2025-07-10 RX ADMIN — HEPARIN SODIUM 4000 UNITS: 1000 INJECTION INTRAVENOUS; SUBCUTANEOUS at 12:41

## 2025-07-10 RX ADMIN — FOLIC ACID 3 MG: 1 TABLET ORAL at 10:12

## 2025-07-10 RX ADMIN — OXYCODONE 5 MG: 5 TABLET ORAL at 20:38

## 2025-07-10 RX ADMIN — MEMANTINE 10 MG: 5 TABLET ORAL at 20:38

## 2025-07-10 RX ADMIN — PREDNISONE 5 MG: 5 TABLET ORAL at 08:52

## 2025-07-10 RX ADMIN — SODIUM CHLORIDE, PRESERVATIVE FREE 10 ML: 5 INJECTION INTRAVENOUS at 08:57

## 2025-07-10 RX ADMIN — Medication 1 G: at 06:12

## 2025-07-10 RX ADMIN — DULOXETINE HYDROCHLORIDE 60 MG: 60 CAPSULE, DELAYED RELEASE PELLETS ORAL at 20:38

## 2025-07-10 RX ADMIN — OXYCODONE 5 MG: 5 TABLET ORAL at 03:24

## 2025-07-10 RX ADMIN — Medication 1 G: at 16:43

## 2025-07-10 RX ADMIN — APIXABAN 5 MG: 5 TABLET, FILM COATED ORAL at 20:43

## 2025-07-10 RX ADMIN — CELECOXIB 100 MG: 100 CAPSULE ORAL at 20:38

## 2025-07-10 RX ADMIN — CARVEDILOL 6.25 MG: 3.12 TABLET, FILM COATED ORAL at 06:12

## 2025-07-10 RX ADMIN — DULOXETINE HYDROCHLORIDE 60 MG: 60 CAPSULE, DELAYED RELEASE PELLETS ORAL at 08:47

## 2025-07-10 RX ADMIN — ARFORMOTEROL TARTRATE: 15 SOLUTION RESPIRATORY (INHALATION) at 19:10

## 2025-07-10 RX ADMIN — ACETAMINOPHEN 650 MG: 325 TABLET ORAL at 20:38

## 2025-07-10 RX ADMIN — SODIUM CHLORIDE, PRESERVATIVE FREE 10 ML: 5 INJECTION INTRAVENOUS at 20:41

## 2025-07-10 RX ADMIN — CEFAZOLIN 2000 MG: 10 INJECTION, POWDER, FOR SOLUTION INTRAVENOUS at 03:24

## 2025-07-10 RX ADMIN — ACETAMINOPHEN 650 MG: 325 TABLET ORAL at 14:53

## 2025-07-10 RX ADMIN — CARVEDILOL 6.25 MG: 3.12 TABLET, FILM COATED ORAL at 16:43

## 2025-07-10 RX ADMIN — AMITRIPTYLINE HYDROCHLORIDE 25 MG: 25 TABLET ORAL at 08:49

## 2025-07-10 RX ADMIN — ACETAMINOPHEN 650 MG: 325 TABLET ORAL at 08:52

## 2025-07-10 RX ADMIN — FOLIC ACID 3 MG: 1 TABLET ORAL at 14:53

## 2025-07-10 RX ADMIN — OXYCODONE 5 MG: 5 TABLET ORAL at 10:13

## 2025-07-10 RX ADMIN — CETIRIZINE HYDROCHLORIDE 10 MG: 10 TABLET, FILM COATED ORAL at 16:43

## 2025-07-10 RX ADMIN — GABAPENTIN 800 MG: 400 CAPSULE ORAL at 20:37

## 2025-07-10 RX ADMIN — DILTIAZEM HYDROCHLORIDE 120 MG: 120 CAPSULE, EXTENDED RELEASE ORAL at 08:53

## 2025-07-10 RX ADMIN — ALBUTEROL SULFATE 2 PUFF: 90 AEROSOL, METERED RESPIRATORY (INHALATION) at 13:23

## 2025-07-10 RX ADMIN — ARFORMOTEROL TARTRATE: 15 SOLUTION RESPIRATORY (INHALATION) at 08:16

## 2025-07-10 ASSESSMENT — PAIN SCALES - GENERAL
PAINLEVEL_OUTOF10: 8
PAINLEVEL_OUTOF10: 9
PAINLEVEL_OUTOF10: 10
PAINLEVEL_OUTOF10: 7
PAINLEVEL_OUTOF10: 7

## 2025-07-10 ASSESSMENT — PAIN DESCRIPTION - LOCATION
LOCATION: KNEE
LOCATION: KNEE;GENERALIZED

## 2025-07-10 ASSESSMENT — PAIN DESCRIPTION - DESCRIPTORS
DESCRIPTORS: ACHING
DESCRIPTORS: ACHING

## 2025-07-10 ASSESSMENT — PAIN DESCRIPTION - ORIENTATION
ORIENTATION: LEFT
ORIENTATION: LEFT

## 2025-07-10 NOTE — PROGRESS NOTES
Progress Note        Patient: Alondra Fink MRN: [id number]  SSN: [id number]    YOB: 1950  Age: 75 y.o.  Sex: female      1 Day Post-Op status post Procedure(s) (LRB):  LEFT TOTAL KNEE REPLACEMENT- MAKOPLASTY \"SPEC POP\" (Left)    Admit Date: 2025  Admit Diagnosis: Primary osteoarthritis of left knee [M17.12]  Atrial fibrillation with rapid ventricular response (HCC) [I48.91]  S/P total knee arthroplasty, left [Z96.652]  Atrial fibrillation (HCC) [I48.91]    Subjective:      Doing well.  No complaints.  No SOB.  No Chest Pain.  No Nausea or Vomiting.  No problems eating or voiding.    Objective:        Temp (24hrs), Av.7 °F (36.5 °C), Min:97.2 °F (36.2 °C), Max:98.1 °F (36.7 °C)    Body mass index is 43.55 kg/m².  Patient Vitals for the past 12 hrs:   BP Temp Temp src Pulse Resp SpO2   07/10/25 0542 (!) 181/102 97.2 °F (36.2 °C) Temporal (!) 111 16 96 %   07/10/25 0500 -- -- -- 98 -- --   07/10/25 0300 -- -- -- 93 -- --   07/10/25 0100 -- -- -- 88 -- --   07/10/25 0039 (!) 152/88 97.7 °F (36.5 °C) Oral 75 16 95 %   25 2300 -- -- -- (!) 103 -- --   25 2100 -- -- -- 93 -- --   25 (!) 151/92 97.9 °F (36.6 °C) Oral 82 18 96 %     Recent Labs     07/10/25  0451   HGB 11.1*   HCT 34.1*   *   K 4.9   CL 94*   CO2 23   BUN 12       Physical Exam:  Vital Signs are Stable.   No Acute Distress.    Alert and Oriented.    Negative Homans’ sign.    Toes AROM Full.    Neurovascular exam is normal.    Dressing is Clean, Dry, and Intact.    Assessment/Plan:     Cards and Hospitalist following for post op a fib  May start eliquis if needed at any time. Stop aspirin if doing so.  PT      Signed By: PETROS BARTON MD     July 10, 2025

## 2025-07-10 NOTE — PROGRESS NOTES
Cardiology Progress Note        Patient: Alondra Fink        Sex: female          DOA: 7/9/2025  YOB: 1950      Age:  75 y.o.        LOS:  LOS: 1 day   Assessment/Plan     Principal Problem:    Atrial fibrillation with rapid ventricular response (HCC)  Active Problems:    S/P total knee arthroplasty, left    Atrial fibrillation (HCC)    Hypertension    RA (rheumatoid arthritis) (HCC)    Sleep apnea  Resolved Problems:    * No resolved hospital problems. *      Plan:  Patient remained in atrial fibrillation with intermittent RVR overall improving heart rate  I will increase the dose of Cardizem from 120 mg to 240 mg  Patient is already on heparin  I will get echocardiogram in the morning  If patient remains stable then heparin can be switched to Eliquis  Discussed with patient  Discussed with patient's  on phone                    Atrial fibrillation with RVR, new onset, heart rate is still in the 130s on and off  New right bundle branch block  Hypertension  Obesity  Sleep apnea  Status post left knee surgery today     Plan  Continue with carvedilol and lisinopril  I will add Cardizem  mg daily  Will get echocardiogram  TSH is normal  Patient is a candidate for therapeutic anticoagulation with Eliquis once cleared by orthopedic surgery  Discussed with the patient    Subjective:    cc:          REVIEW OF SYSTEMS:     General: No fevers or chills.  Cardiovascular: No chest pain or pressure. No palpitations. No ankle swelling  Pulmonary: No SOB, orthopnea, PND  Gastrointestinal: No nausea, vomiting or diarrhea      Objective:      Visit Vitals  /81   Pulse (!) 103   Temp 97.7 °F (36.5 °C) (Oral)   Resp 16   Ht 1.549 m (5' 1\")   Wt 104.6 kg (230 lb 8 oz)   SpO2 98%   BMI 43.55 kg/m²     Body mass index is 43.55 kg/m².    Physical Exam:  General Appearance: Comfortable, not using accessory muscles of respiration.  NECK: No JVD, no thyroidomeglay.   LUNGS:  bilateral air entry

## 2025-07-10 NOTE — H&P
Hospitalist History & Physical    Patient: Alondra Fink MRN: [id number]  CSN: [id number]    YOB: 1950  Age: 75 y.o.  Sex: female      DOA: 7/9/2025  Primary Care Provider:  Mateus Montano Sr., MD      Assessment/Plan     Active Hospital Problems    Diagnosis     Atrial fibrillation with rapid ventricular response (HCC) [I48.91]     S/P total knee arthroplasty, left [Z96.652]     Atrial fibrillation (HCC) [I48.91]     Sleep apnea [G47.30]     Hypertension [I10]     RA (rheumatoid arthritis) (HCC) [M06.9]        Admit to monitored floor    A-fib with RVR   Rate/Rhythm Control:  beta-blockers, calcium channel blockers,  to control the heart rate and keep it within a safe range.  Tele Monitoring: Continuous monitoring of the patient's heart rate/rhythm to track any changes and assess the effectiveness of treatment.  Assessment of Stroke Risk: [id number] 3 recommend anticoagulation therapy.  Anticoagulation: heparin drip/eliquis once okay from Ortho.  TSH in normal range  Echo.  Cardiology consulted    Hypertension  Continue with home medication  Monitor blood pressure.    Hypothyroidism  Continue Synthroid    Rheumatoid arthritis  Continue home meds    DVT Prophylaxis:  []Lovenox SQ  [] Heparin SQ  [] SCDs  [] Coumadin, Eliquis, Xarelto,   [] On Heparin gtt or therapeutic Lovenox. [] Patient refused.  Start on anticoagulation once okay with Ortho    Case discussed with:  [x]Patient  []Family [] Consultants  []Nursing  []Case Management   [] ED Provider      Estimated length of stay : 2 days    CC: Atrial fibrillation new onset       HPI:     Alondra Fink is a 75 y.o. female with hypertension, hypothyroidism, rheumatoid arthritis, chronic fatigue is status post left knee TKR.  Postop she developed atrial fibrillation with RVR.  Patient denies any chest pain or shortness of breath.  She did feel intermittent palpitation.  She denies any history of cardiac issues in the past.  No  (SYNTHROID) 150 MCG tablet Take 1 tablet by mouth every morning (before breakfast) Indications: hypothyroid   Yes Automatic Reconciliation, Ar   lisinopril (PRINIVIL;ZESTRIL) 5 MG tablet Take 1 tablet by mouth every morning Indications: HTN   Yes Automatic Reconciliation, Ar   memantine (NAMENDA) 10 MG tablet Take 1 tablet by mouth every evening Indications: dementia   Yes Automatic Reconciliation, Ar   omeprazole (PRILOSEC OTC) 20 MG tablet Take 1 tablet by mouth every morning Indications: GERD   Yes Automatic Reconciliation, Ar   predniSONE (DELTASONE) 5 MG tablet Take 1 tablet by mouth Twice a Week Indications: RA   Yes Automatic Reconciliation, Ar   sodium chloride 1 g tablet Take 1 tablet by mouth 2 times daily (with meals) 10/27/21  Yes Automatic Reconciliation, Ar   celecoxib (CELEBREX) 100 MG capsule Take 1 capsule by mouth 2 times daily Indications: RA    Automatic Reconciliation, Ar   cyanocobalamin 1000 MCG/ML injection Inject 1 mL into the muscle every 30 days Indications: vitamin B12 deficiency    Automatic Reconciliation, Ar   DULoxetine (CYMBALTA) 60 MG extended release capsule Take 1 capsule by mouth 2 times daily    Automatic Reconciliation, Ar   escitalopram (LEXAPRO) 20 MG tablet Take 1 tablet by mouth every morning Indications: depression    Automatic Reconciliation, Ar   methotrexate (RHEUMATREX) 2.5 MG chemo tablet Take 1 tablet by mouth every 7 days Indications: RA Takes on Thursdays.    Automatic Reconciliation, Ar         Allergies   Allergen Reactions    Amoxicillin Rash     Other reaction(s): Unknown (comments)    Erythromycin Anaphylaxis     Other reaction(s): Unknown (comments)    Penicillins Anaphylaxis     Other reaction(s): Unknown (comments)    Buckwheat Other (See Comments)     Other reaction(s): Unknown (comments)    Cayenne Hives     Other reaction(s): Unknown (comments)    Cheese Other (See Comments)     Other reaction(s): Unknown (comments)    Egg White (Egg Protein)      Other

## 2025-07-10 NOTE — PROGRESS NOTES
Care Mgmt Assistant, assisting Care Mgr Pedro Schaefer RN with Discharge Planning needs for patient.  Referral sent to [facility] (patient's preference) for review and consideration for home care needs.    Will follow for further plans.

## 2025-07-10 NOTE — PROGRESS NOTES
Physical Therapy Goals:  Pt goals to be met in 1-7 days:  Pt will be able to perform supine<>sit CGA for transfer ease at home.  Pt will be able to perform sit<>stand CGA for increased ability to transfer at home safely.  Pt will be able to participate in gait training >50' w/ RW, WBAT, GB and CGA for improved ability in home upon d/c.  Pt will be able to perform stair training step to pattern, B/U rail and CGA to obtain safe entry into home upon d/c.  Pt will be educated regarding HEP per MD protocol for optimal AROM/strength outcomes.        [x]  Patient has met MD mobilization critieria for d/c home   [x]  HH with 24 hour adult care   []  Benefit from additional acute PT session to address:        PHYSICAL THERAPY TREATMENT    Patient: Alondra Fink (75 y.o. female)  Date: 7/10/2025  Diagnosis: Primary osteoarthritis of left knee [M17.12]  Atrial fibrillation with rapid ventricular response (HCC) [I48.91]  S/P total knee arthroplasty, left [Z96.652]  Atrial fibrillation (HCC) [I48.91] Atrial fibrillation with rapid ventricular response (HCC)  Procedure(s) (LRB):  LEFT TOTAL KNEE REPLACEMENT- MAKOPLASTY \"SPEC POP\" (Left) 1 Day Post-Op  Precautions: Weight Bearing, Surgical Protocols, Fall Risk,  , Left Lower Extremity Weight Bearing: Weight Bearing As Tolerated,  ,  ,  ,  ,        ASSESSMENT:  Introduced self to pt and pt agreeable to PT treatment.  Pt seen w/ OT for second pair of skilled hands.  PPE donned for contact precautions.  Pt supine in bed upon arrival.  Pt rating pain in L knee 7/10 using numerical pain scale.  Pt able to transfer supine<>sit<>stand CGA/SBA.  Gait training using RW, WBAT, GB and CGA in room w/ antalgic gait pattern.  Reviewed HEP, pt compliant.  Pt HR 85 bpm during/after gait. Of note pt reports her apple watch last week was signaling her w/ abnormal HR.   Applied thigh high compression stocking to R LE and L LE stocking placed in pt personal belonging bag.  Pt returned to supine in

## 2025-07-10 NOTE — CARE COORDINATION
07/10/25 0925   Service Assessment   Patient Orientation Alert and Oriented   Cognition Alert   History Provided By Patient   Primary Caregiver Self   Accompanied By/Relationship N/A   Support Systems Spouse/Significant Other   PCP Verified by CM Yes   Last Visit to PCP Within last 3 months   Prior Functional Level Independent in ADLs/IADLs   Current Functional Level Independent in ADLs/IADLs   Can patient return to prior living arrangement Yes   Ability to make needs known: Good   Family able to assist with home care needs: Yes   Would you like for me to discuss the discharge plan with any other family members/significant others, and if so, who? Yes   Financial Resources Medicare   Social/Functional History   Lives With Spouse   Type of Home House   Home Equipment Cane;Walker - Rolling;Wheelchair - Manual   Receives Help From Family   Prior Level of Assist for ADLs Independent   Prior Level of Assist for Homemaking Independent   Ambulation Assistance Independent   Prior Level of Assist for Transfers Independent   Active  No   Occupation Retired   Discharge Planning   Type of Residence House   Living Arrangements Spouse/Significant Other   Current Services Prior To Admission None   Potential Assistance Needed N/A   DME Ordered? No   Potential Assistance Purchasing Medications No   Type of Home Care Services None   Patient expects to be discharged to: House   History of falls? 0   Services At/After Discharge   Transition of Care Consult (CM Consult) Home Health;Discharge Planning   Services At/After Discharge Home Health   Mode of Transport at Discharge Other (see comment)  (Spouse)   Confirm Follow Up Transport Self   Condition of Participation: Discharge Planning   The Plan for Transition of Care is related to the following treatment goals: Home when stable   The Patient and/or Patient Representative was provided with a Choice of Provider? Patient   The Patient and/Or Patient Representative agree with the

## 2025-07-10 NOTE — PROGRESS NOTES
Bladder scanned patient because she received bolus of fluids and consumed several cups of water throughout night but has not put out much urine. Bladder scan shows 600ml. Performed angela care, replaced purewick and instructed patient to attempt to pee. She was immediately able to urinate 200 ml. Instructed to continue intermittently attempting to urinate. Will include this in report to oncoming nurse to monitor for any further s/s of possible retention

## 2025-07-10 NOTE — PROGRESS NOTES
Occupational Therapy Goals:  Initiated 7/10/2025 to be met within 7-10 days.  Short Term Goals  Time Frame for Short Term Goals: 7 days  Short Term Goal 1: Pt will perform toilet transfer and toileting with mod I  Short Term Goal 2: Pt will perform grooming at sinkside with mod I  Short Term Goal 3: Pt will perform LB dressing with mod I  Short Term Goal 4: Pt will perform bathing with mod I  OCCUPATIONAL THERAPY EVALUATION    Patient: Alondra Fink (75 y.o. female)  Date: 7/10/2025  Primary Diagnosis: Primary osteoarthritis of left knee [M17.12]  Atrial fibrillation with rapid ventricular response (HCC) [I48.91]  S/P total knee arthroplasty, left [Z96.652]  Atrial fibrillation (HCC) [I48.91]  Procedure(s) (LRB):  LEFT TOTAL KNEE REPLACEMENT- MAKOPLASTY \"SPEC POP\" (Left) 1 Day Post-Op   Precautions: Weight Bearing, Surgical Protocols, Fall Risk,  , Left Lower Extremity Weight Bearing: Weight Bearing As Tolerated,  ,  ,  ,  ,    PLOF: I with ADLs prior    ASSESSMENT :  Pt received .supine in bed. Alert and oriented. Agreeable to therapy. Reporting 7/10 pain. Seen with PT. Attached to [email or web address] , and [employer]. No family present in room. Pt educated on the following: mel dressing techniques for lower body dressing, safety with hand placement for functional transfers with RW, sleeping positions, weight bearing precautions, energy conservation techniques, safe bathroom mobility with RW and toilet transfers , icing schedule, and proper positioning of affected extremity. Pt completing ADLs and functional mobility with PT and OT this session. See below for assist level and details. See PT note for details on gait. Pt left supine in bed. All needs met and in reach.     ADLs:  Lower body dressing: Max A- Pt required max A to thread BLEs into brief while seated EOB, pt required mod A to pull briefs up around hips while standing   Footwear: Mod A-pt donned R sock with supervision; pt required max A to don L sock  Re-education:      Pain:  Pain level pre-treatment: 7/10   Pain level post-treatment: 7/10   Pain Intervention(s): Rest, Ice, Repositioning  Response to intervention: Nurse notified    Activity Tolerance:   Activity Tolerance: Patient Tolerated treatment well, Patient limited by pain  Please refer to the flowsheet for vital signs taken during this treatment.    After treatment:   [] Patient left in no apparent distress sitting up in chair  [x] Patient left in no apparent distress in bed  [x] Call bell left within reach  [x] Nursing notified  [] Caregiver present  [] Bed alarm activated    COMMUNICATION/EDUCATION:   Patient Education  Education Given To: Patient  Education Provided: Role of Therapy;Plan of Care;Precautions;ADL Adaptive Strategies;Transfer Training;Mobility Training;Fall Prevention Strategies;Equipment;IADL Safety  Education Method: Verbal  Barriers to Learning: None  Education Outcome: Demonstrated understanding;Verbalized understanding;Continued education needed    Thank you for this referral.  Kathe Marrufo OTR/L  Minutes: 24    Eval Complexity: Decision Making: Low Complexity

## 2025-07-11 ENCOUNTER — APPOINTMENT (OUTPATIENT)
Facility: HOSPITAL | Age: 75
End: 2025-07-11
Attending: INTERNAL MEDICINE
Payer: MEDICARE

## 2025-07-11 VITALS
HEART RATE: 100 BPM | HEIGHT: 61 IN | TEMPERATURE: 97.9 F | RESPIRATION RATE: 16 BRPM | DIASTOLIC BLOOD PRESSURE: 67 MMHG | BODY MASS INDEX: 43.43 KG/M2 | OXYGEN SATURATION: 95 % | WEIGHT: 230 LBS | SYSTOLIC BLOOD PRESSURE: 112 MMHG

## 2025-07-11 LAB
ALBUMIN SERPL-MCNC: 3.1 G/DL (ref 3.4–5)
ALBUMIN/GLOB SERPL: 1.2 (ref 0.8–1.7)
ALP SERPL-CCNC: 103 U/L (ref 45–117)
ALT SERPL-CCNC: 35 U/L (ref 10–35)
ANION GAP SERPL CALC-SCNC: 11 MMOL/L (ref 3–18)
AST SERPL-CCNC: 33 U/L (ref 10–38)
BASOPHILS # BLD: 0.04 K/UL (ref 0–0.1)
BASOPHILS NFR BLD: 0.4 % (ref 0–2)
BILIRUB SERPL-MCNC: 0.5 MG/DL (ref 0.2–1)
BUN SERPL-MCNC: 12 MG/DL (ref 6–23)
BUN/CREAT SERPL: 15 (ref 12–20)
CALCIUM SERPL-MCNC: 8.2 MG/DL (ref 8.5–10.1)
CHLORIDE SERPL-SCNC: 95 MMOL/L (ref 98–107)
CO2 SERPL-SCNC: 22 MMOL/L (ref 21–32)
CREAT SERPL-MCNC: 0.79 MG/DL (ref 0.6–1.3)
DIFFERENTIAL METHOD BLD: ABNORMAL
ECHO AO ROOT DIAM: 2.7 CM
ECHO AO ROOT INDEX: 1.35 CM/M2
ECHO AV AREA PEAK VELOCITY: 2.7 CM2
ECHO AV AREA VTI: 2.6 CM2
ECHO AV AREA/BSA PEAK VELOCITY: 1.4 CM2/M2
ECHO AV AREA/BSA VTI: 1.3 CM2/M2
ECHO AV MEAN GRADIENT: 2 MMHG
ECHO AV MEAN VELOCITY: 0.7 M/S
ECHO AV PEAK GRADIENT: 4 MMHG
ECHO AV PEAK VELOCITY: 1 M/S
ECHO AV VELOCITY RATIO: 0.8
ECHO AV VTI: 18.4 CM
ECHO BSA: 2.12 M2
ECHO EST RA PRESSURE: 3 MMHG
ECHO LA DIAMETER INDEX: 2.1 CM/M2
ECHO LA DIAMETER: 4.2 CM
ECHO LA TO AORTIC ROOT RATIO: 1.56
ECHO LA VOL A-L A2C: 49 ML (ref 22–52)
ECHO LA VOL A-L A4C: 70 ML (ref 22–52)
ECHO LA VOL BP: 58 ML (ref 22–52)
ECHO LA VOL MOD A2C: 47 ML (ref 22–52)
ECHO LA VOL MOD A4C: 68 ML (ref 22–52)
ECHO LA VOL/BSA BIPLANE: 29 ML/M2 (ref 16–34)
ECHO LA VOLUME AREA LENGTH: 61 ML
ECHO LA VOLUME INDEX A-L A2C: 25 ML/M2 (ref 16–34)
ECHO LA VOLUME INDEX A-L A4C: 35 ML/M2 (ref 16–34)
ECHO LA VOLUME INDEX AREA LENGTH: 31 ML/M2 (ref 16–34)
ECHO LA VOLUME INDEX MOD A2C: 24 ML/M2 (ref 16–34)
ECHO LA VOLUME INDEX MOD A4C: 34 ML/M2 (ref 16–34)
ECHO LV E' LATERAL VELOCITY: 10.79 CM/S
ECHO LV E' SEPTAL VELOCITY: 11.59 CM/S
ECHO LV EDV A2C: 45 ML
ECHO LV EDV A4C: 58 ML
ECHO LV EDV BP: 51 ML (ref 56–104)
ECHO LV EDV INDEX A4C: 29 ML/M2
ECHO LV EDV INDEX BP: 26 ML/M2
ECHO LV EDV NDEX A2C: 23 ML/M2
ECHO LV EF PHYSICIAN: 60 %
ECHO LV EJECTION FRACTION A2C: 63 %
ECHO LV EJECTION FRACTION A4C: 59 %
ECHO LV EJECTION FRACTION BIPLANE: 61 % (ref 55–100)
ECHO LV ESV A2C: 17 ML
ECHO LV ESV A4C: 24 ML
ECHO LV ESV BP: 20 ML (ref 19–49)
ECHO LV ESV INDEX A2C: 9 ML/M2
ECHO LV ESV INDEX A4C: 12 ML/M2
ECHO LV ESV INDEX BP: 10 ML/M2
ECHO LV FRACTIONAL SHORTENING: 36 % (ref 28–44)
ECHO LV INTERNAL DIMENSION DIASTOLE INDEX: 2.75 CM/M2
ECHO LV INTERNAL DIMENSION DIASTOLIC: 5.5 CM (ref 3.9–5.3)
ECHO LV INTERNAL DIMENSION SYSTOLIC INDEX: 1.75 CM/M2
ECHO LV INTERNAL DIMENSION SYSTOLIC: 3.5 CM
ECHO LV IVSD: 1.1 CM (ref 0.6–0.9)
ECHO LV MASS 2D: 242 G (ref 67–162)
ECHO LV MASS INDEX 2D: 121 G/M2 (ref 43–95)
ECHO LV POSTERIOR WALL DIASTOLIC: 1.1 CM (ref 0.6–0.9)
ECHO LV RELATIVE WALL THICKNESS RATIO: 0.4
ECHO LVOT AREA: 3.8 CM2
ECHO LVOT AV VTI INDEX: 0.7
ECHO LVOT DIAM: 2.2 CM
ECHO LVOT MEAN GRADIENT: 1 MMHG
ECHO LVOT PEAK GRADIENT: 2 MMHG
ECHO LVOT PEAK VELOCITY: 0.8 M/S
ECHO LVOT STROKE VOLUME INDEX: 24.3 ML/M2
ECHO LVOT SV: 48.6 ML
ECHO LVOT VTI: 12.8 CM
ECHO MV A VELOCITY: 0.49 M/S
ECHO MV E DECELERATION TIME (DT): 124.2 MS
ECHO MV E VELOCITY: 0.37 M/S
ECHO MV E/A RATIO: 0.76
ECHO MV E/E' LATERAL: 3.43
ECHO MV E/E' RATIO (AVERAGED): 3.31
ECHO MV E/E' SEPTAL: 3.19
ECHO PULMONARY ARTERY SYSTOLIC PRESSURE (PASP): 28 MMHG
ECHO RIGHT VENTRICULAR SYSTOLIC PRESSURE (RVSP): 25 MMHG
ECHO RV FREE WALL PEAK S': 9.8 CM/S
ECHO RV TAPSE: 1.6 CM (ref 1.7–?)
ECHO TV REGURGITANT MAX VELOCITY: 2.36 M/S
ECHO TV REGURGITANT PEAK GRADIENT: 22 MMHG
EOSINOPHIL # BLD: 0.08 K/UL (ref 0–0.4)
EOSINOPHIL NFR BLD: 0.8 % (ref 0–5)
ERYTHROCYTE [DISTWIDTH] IN BLOOD BY AUTOMATED COUNT: 14.5 % (ref 11.6–14.5)
GLOBULIN SER CALC-MCNC: 2.7 G/DL (ref 2–4)
GLUCOSE SERPL-MCNC: 101 MG/DL (ref 74–108)
HCT VFR BLD AUTO: 30.7 % (ref 35–45)
HGB BLD-MCNC: 10 G/DL (ref 12–16)
IMM GRANULOCYTES # BLD AUTO: 0.07 K/UL (ref 0–0.04)
IMM GRANULOCYTES NFR BLD AUTO: 0.7 % (ref 0–0.5)
LYMPHOCYTES # BLD: 2.45 K/UL (ref 0.9–3.3)
LYMPHOCYTES NFR BLD: 24 % (ref 21–52)
MAGNESIUM SERPL-MCNC: 1.9 MG/DL (ref 1.6–2.6)
MCH RBC QN AUTO: 32.3 PG (ref 24–34)
MCHC RBC AUTO-ENTMCNC: 32.6 G/DL (ref 31–37)
MCV RBC AUTO: 99 FL (ref 78–100)
MONOCYTES # BLD: 1.32 K/UL (ref 0.05–1.2)
MONOCYTES NFR BLD: 12.9 % (ref 3–10)
NEUTS SEG # BLD: 6.24 K/UL (ref 1.8–8)
NEUTS SEG NFR BLD: 61.2 % (ref 40–73)
NRBC # BLD: 0 K/UL (ref 0–0.01)
NRBC BLD-RTO: 0 PER 100 WBC
PLATELET # BLD AUTO: 229 K/UL (ref 135–420)
PMV BLD AUTO: 9.9 FL (ref 9.2–11.8)
POTASSIUM SERPL-SCNC: 4.4 MMOL/L (ref 3.5–5.5)
PROT SERPL-MCNC: 5.8 G/DL (ref 6.4–8.2)
RBC # BLD AUTO: 3.1 M/UL (ref 4.2–5.3)
SODIUM SERPL-SCNC: 129 MMOL/L (ref 136–145)
WBC # BLD AUTO: 10.2 K/UL (ref 4.6–13.2)

## 2025-07-11 PROCEDURE — 6370000000 HC RX 637 (ALT 250 FOR IP): Performed by: ORTHOPAEDIC SURGERY

## 2025-07-11 PROCEDURE — 36415 COLL VENOUS BLD VENIPUNCTURE: CPT

## 2025-07-11 PROCEDURE — 94640 AIRWAY INHALATION TREATMENT: CPT

## 2025-07-11 PROCEDURE — 2500000003 HC RX 250 WO HCPCS: Performed by: ORTHOPAEDIC SURGERY

## 2025-07-11 PROCEDURE — 6370000000 HC RX 637 (ALT 250 FOR IP): Performed by: STUDENT IN AN ORGANIZED HEALTH CARE EDUCATION/TRAINING PROGRAM

## 2025-07-11 PROCEDURE — 6370000000 HC RX 637 (ALT 250 FOR IP): Performed by: HOSPITALIST

## 2025-07-11 PROCEDURE — 85025 COMPLETE CBC W/AUTO DIFF WBC: CPT

## 2025-07-11 PROCEDURE — 83735 ASSAY OF MAGNESIUM: CPT

## 2025-07-11 PROCEDURE — 80053 COMPREHEN METABOLIC PANEL: CPT

## 2025-07-11 PROCEDURE — 6360000002 HC RX W HCPCS: Performed by: STUDENT IN AN ORGANIZED HEALTH CARE EDUCATION/TRAINING PROGRAM

## 2025-07-11 PROCEDURE — 93306 TTE W/DOPPLER COMPLETE: CPT

## 2025-07-11 RX ORDER — DILTIAZEM HYDROCHLORIDE 240 MG/1
240 CAPSULE, COATED, EXTENDED RELEASE ORAL DAILY
Qty: 30 CAPSULE | Refills: 3 | Status: SHIPPED | OUTPATIENT
Start: 2025-07-11

## 2025-07-11 RX ORDER — SODIUM CHLORIDE 1 G/1
1 TABLET ORAL
Status: DISCONTINUED | OUTPATIENT
Start: 2025-07-11 | End: 2025-07-11 | Stop reason: HOSPADM

## 2025-07-11 RX ADMIN — CARVEDILOL 6.25 MG: 3.12 TABLET, FILM COATED ORAL at 16:04

## 2025-07-11 RX ADMIN — CELECOXIB 100 MG: 100 CAPSULE ORAL at 08:43

## 2025-07-11 RX ADMIN — ACETAMINOPHEN 650 MG: 325 TABLET ORAL at 04:41

## 2025-07-11 RX ADMIN — Medication 1 G: at 16:04

## 2025-07-11 RX ADMIN — DULOXETINE HYDROCHLORIDE 60 MG: 60 CAPSULE, DELAYED RELEASE PELLETS ORAL at 08:42

## 2025-07-11 RX ADMIN — SODIUM CHLORIDE, PRESERVATIVE FREE 10 ML: 5 INJECTION INTRAVENOUS at 08:44

## 2025-07-11 RX ADMIN — POLYETHYLENE GLYCOL 3350 17 G: 17 POWDER, FOR SOLUTION ORAL at 12:10

## 2025-07-11 RX ADMIN — PANTOPRAZOLE SODIUM 40 MG: 40 TABLET, DELAYED RELEASE ORAL at 08:42

## 2025-07-11 RX ADMIN — GABAPENTIN 800 MG: 400 CAPSULE ORAL at 08:42

## 2025-07-11 RX ADMIN — ARFORMOTEROL TARTRATE: 15 SOLUTION RESPIRATORY (INHALATION) at 08:47

## 2025-07-11 RX ADMIN — CARVEDILOL 6.25 MG: 3.12 TABLET, FILM COATED ORAL at 08:42

## 2025-07-11 RX ADMIN — Medication 1 G: at 08:50

## 2025-07-11 RX ADMIN — OXYCODONE 5 MG: 5 TABLET ORAL at 13:15

## 2025-07-11 RX ADMIN — ACETAMINOPHEN 650 MG: 325 TABLET ORAL at 12:17

## 2025-07-11 RX ADMIN — Medication 1 G: at 12:10

## 2025-07-11 RX ADMIN — AMITRIPTYLINE HYDROCHLORIDE 25 MG: 25 TABLET ORAL at 08:42

## 2025-07-11 RX ADMIN — LEVOTHYROXINE SODIUM 150 MCG: 0.07 TABLET ORAL at 08:42

## 2025-07-11 RX ADMIN — ACETAMINOPHEN 650 MG: 325 TABLET ORAL at 08:51

## 2025-07-11 RX ADMIN — GABAPENTIN 800 MG: 400 CAPSULE ORAL at 13:48

## 2025-07-11 RX ADMIN — LISINOPRIL 5 MG: 5 TABLET ORAL at 08:42

## 2025-07-11 RX ADMIN — ACETAMINOPHEN 650 MG: 325 TABLET ORAL at 16:04

## 2025-07-11 RX ADMIN — ESCITALOPRAM OXALATE 20 MG: 10 TABLET ORAL at 08:42

## 2025-07-11 RX ADMIN — OXYCODONE 5 MG: 5 TABLET ORAL at 17:09

## 2025-07-11 RX ADMIN — FOLIC ACID 3 MG: 1 TABLET ORAL at 08:42

## 2025-07-11 RX ADMIN — OXYCODONE 5 MG: 5 TABLET ORAL at 01:46

## 2025-07-11 RX ADMIN — FOLIC ACID 3 MG: 1 TABLET ORAL at 13:14

## 2025-07-11 RX ADMIN — OXYCODONE 5 MG: 5 TABLET ORAL at 08:51

## 2025-07-11 RX ADMIN — APIXABAN 5 MG: 5 TABLET, FILM COATED ORAL at 08:42

## 2025-07-11 RX ADMIN — CETIRIZINE HYDROCHLORIDE 10 MG: 10 TABLET, FILM COATED ORAL at 17:09

## 2025-07-11 ASSESSMENT — PAIN DESCRIPTION - LOCATION
LOCATION: LEG
LOCATION: KNEE

## 2025-07-11 ASSESSMENT — PAIN DESCRIPTION - ORIENTATION
ORIENTATION: LEFT

## 2025-07-11 ASSESSMENT — PAIN DESCRIPTION - DESCRIPTORS
DESCRIPTORS: ACHING
DESCRIPTORS: ACHING
DESCRIPTORS: ACHING;DISCOMFORT
DESCRIPTORS: ACHING
DESCRIPTORS: ACHING;DISCOMFORT
DESCRIPTORS: ACHING;DULL

## 2025-07-11 ASSESSMENT — PAIN SCALES - GENERAL
PAINLEVEL_OUTOF10: 5
PAINLEVEL_OUTOF10: 7
PAINLEVEL_OUTOF10: 3
PAINLEVEL_OUTOF10: 8
PAINLEVEL_OUTOF10: 3
PAINLEVEL_OUTOF10: 7

## 2025-07-11 ASSESSMENT — PAIN - FUNCTIONAL ASSESSMENT: PAIN_FUNCTIONAL_ASSESSMENT: ACTIVITIES ARE NOT PREVENTED

## 2025-07-11 NOTE — PROGRESS NOTES
Cardiology Progress Note        Patient: Alondra Fink        Sex: female          DOA: 7/9/2025  YOB: 1950      Age:  75 y.o.        LOS:  LOS: 2 days   Assessment/Plan     Principal Problem:    Atrial fibrillation with rapid ventricular response (HCC)  Active Problems:    S/P total knee arthroplasty, left    Atrial fibrillation (HCC)    Hypertension    RA (rheumatoid arthritis) (HCC)    Sleep apnea  Resolved Problems:    * No resolved hospital problems. *      Plan:  A-fib is much better controlled  Patient have no cardiac symptoms  Echocardiogram done EF is normal 55% to 60%  Patient can be discharged on Cardizem  mg once a day and Eliquis 5 mg twice a day along with carvedilol/lisinopril  Discussed with patient to monitor blood pressure at home  Follow-up with primary cardiologist  Patient has seen a cardiologist before knee surgery for cardiac clearance she will continue with him  Discussed with Dr. Cole.  Thanks                          Patient remained in atrial fibrillation with intermittent RVR overall improving heart rate  I will increase the dose of Cardizem from 120 mg to 240 mg  Patient is already on heparin  I will get echocardiogram in the morning  If patient remains stable then heparin can be switched to Eliquis  Discussed with patient  Discussed with patient's  on phone                    Atrial fibrillation with RVR, new onset, heart rate is still in the 130s on and off  New right bundle branch block  Hypertension  Obesity  Sleep apnea  Status post left knee surgery today     Plan  Continue with carvedilol and lisinopril  I will add Cardizem  mg daily  Will get echocardiogram  TSH is normal  Patient is a candidate for therapeutic anticoagulation with Eliquis once cleared by orthopedic surgery  Discussed with the patient    Subjective:    cc:          REVIEW OF SYSTEMS:     General: No fevers or chills.  Cardiovascular: No chest pain or pressure. No  palpitations. No ankle swelling  Pulmonary: No SOB, orthopnea, PND  Gastrointestinal: No nausea, vomiting or diarrhea      Objective:      Visit Vitals  /67   Pulse 100   Temp 97.9 °F (36.6 °C) (Oral)   Resp 16   Ht 1.549 m (5' 1\")   Wt 104.3 kg (230 lb)   SpO2 95%   BMI 43.46 kg/m²     Body mass index is 43.46 kg/m².    Physical Exam:  General Appearance: Comfortable, not using accessory muscles of respiration.  NECK: No JVD, no thyroidomeglay.   LUNGS:  bilateral air entry positive   HEART: S1 irregular, variable +S2 audible,    ABD: Non-tender, BS Audible    EXT: No edema, and no cysnosis.  Left knee replacement  VASCULAR EXAM: Pulses are intact.    PSYCHIATRIC EXAM: Mood is appropriate.    Medication:  Current Facility-Administered Medications   Medication Dose Route Frequency    sodium chloride tablet 1 g  1 g Oral TID WC    dilTIAZem (CARDIZEM CD) extended release capsule 240 mg  240 mg Oral Daily    apixaban (ELIQUIS) tablet 5 mg  5 mg Oral BID    tranexamic acid-NaCl IVPB premix 1,000 mg  1,000 mg IntraVENous Once    sodium chloride flush 0.9 % injection 5-40 mL  5-40 mL IntraVENous 2 times per day    sodium chloride flush 0.9 % injection 5-40 mL  5-40 mL IntraVENous PRN    0.9 % sodium chloride infusion   IntraVENous PRN    acetaminophen (TYLENOL) tablet 650 mg  650 mg Oral Q6H    oxyCODONE (ROXICODONE) immediate release tablet 5 mg  5 mg Oral Q4H PRN    Or    oxyCODONE (ROXICODONE) immediate release tablet 5 mg  5 mg Oral Q4H PRN    ondansetron (ZOFRAN-ODT) disintegrating tablet 4 mg  4 mg Oral Q8H PRN    Or    ondansetron (ZOFRAN) injection 4 mg  4 mg IntraVENous Q6H PRN    amitriptyline (ELAVIL) tablet 25 mg  25 mg Oral QAM    carvedilol (COREG) tablet 6.25 mg  6.25 mg Oral BID WC    donepezil (ARICEPT) tablet 5 mg  5 mg Oral Nightly    escitalopram (LEXAPRO) tablet 20 mg  20 mg Oral QAM    folic acid (FOLVITE) tablet 3 mg  3 mg Oral TID    gabapentin (NEURONTIN) capsule 800 mg  800 mg Oral TID     Date: 7/9/2025  Routine postoperative changes. Electronically signed by SOLEDAD Pisano          Cardiology Procedures:   No results found for this or any previous visit. No results found for this or any previous visit.    No results found for this or any previous visit.                Signed By: CARMINE GARCIA MD     July 11, 2025

## 2025-07-11 NOTE — PROGRESS NOTES
Hospitalist Progress Note    Patient: Alondra Fink MRN: [id number]  CSN: [id number]    YOB: 1950  Age: 75 y.o.  Sex: female    DOA: 7/9/2025 LOS:  LOS: 1 day          Chief Complain :  A-fib  75 y.o. female with hypertension, hypothyroidism, rheumatoid arthritis, chronic fatigue is status post left knee TKR.  Postop she developed atrial fibrillation with RVR.   Subjective:   Patient laying in bed, complains of left knee pain.    Assessment/Plan     Active Hospital Problems    Diagnosis     Atrial fibrillation with rapid ventricular response (HCC) [I48.91]     S/P total knee arthroplasty, left [Z96.652]     Atrial fibrillation (HCC) [I48.91]     Sleep apnea [G47.30]     Hypertension [I10]     RA (rheumatoid arthritis) (HCC) [M06.9]           A-fib with RVR   Rate/Rhythm Control:  beta-blockers, calcium channel blockers,  to control the heart rate and keep it within a safe range.  Tele Monitoring: Continuous monitoring of the patient's heart rate/rhythm to track any changes and assess the effectiveness of treatment.  Assessment of Stroke Risk: [id number] 3   Ok from ortho to start on anticoagulation.  TSH in normal range  Echo.  Cardiology following     Hypertension  Continue with home medication  Monitor blood pressure.     Hypothyroidism  Continue Synthroid     Rheumatoid arthritis  Continue home meds      DVT Prophylaxis:  []Lovenox SQ  [] Heparin SQ  [] SCDs  [x] Coumadin, Eliquis, Xarelto,   [] On Heparin gtt or therapeutic Lovenox. [] Patient refused.       Case discussed with:  [x]Patient  []Family [x] Consultants  []Nursing  []Case Management   [] Others      Discharge to;  [] Home  [x] Home Health  []  SNF/Rehab  [] LTAC. In 1-2 days    Review of systems : As above and,  General: No fevers or chills.  Cardiovascular: No chest pain or pressure. No palpitations.   Pulmonary: No shortness of breath.   Gastrointestinal: No nausea, vomiting.     Physical Exam: As above and,  General: Awake, cooperative, no acute distress    HEENT: NC, Atraumatic.  PERRLA, anicteric sclerae.  Lungs: CTA Bilaterally. No Wheezing/Rhonchi/Rales.  Heart:  S1 S2,  No murmur, No Rubs, No Gallops  Abdomen: Soft, Non distended, Non tender.  +Bowel sounds,   Extremities: Left knee with dressing.  Psych:   Not anxious or agitated.  Neurologic:  No acute neurological deficit.         Vital signs/Intake and Output:  Visit Vitals  /74   Pulse (!) 102   Temp 97.7 °F (36.5 °C) (Oral)   Resp 17   Ht 1.549 m (5' 1\")   Wt 104.6 kg (230 lb 8 oz)   SpO2 96%   BMI 43.55 kg/m²     Current Shift:  No intake/output data recorded.  Last three shifts:  07/09 0701 - 07/10 1900  In: 4626.3 [P.O.:2470; I.V.:2056.3]  Out: 1200 [Urine:1150]            Labs: Results:       Chemistry Recent Labs     07/09/25  1602 07/10/25  0451   * 130*   K 4.6 4.9   CL 97* 94*   CO2 22 23   BUN 12 12   GLOB 2.9 2.6   ,  Lab Results   Component Value Date/Time    LACTA 1.0 10/23/2021 01:25 PM    LACTA 1.1 09/10/2021 04:20 AM      CBC w/Diff Recent Labs     07/09/25  1602 07/10/25  0451   WBC 13.0 16.1*   RBC 3.59* 3.42*   HGB 11.5* 11.1*   HCT 35.2 34.1*    257      Cardiac Enzymes Lab Results   Component Value Date    CKTOTAL 89 10/14/2021    TROPONINI <0.015 09/10/2021    TROPHS 19 10/23/2021   ,  Lab Results   Component Value Date    BNP 3,099 (H) 10/23/2021      Coagulation Recent Labs     07/10/25  1143 07/10/25  1821   APTT 28.0 54.9*       Lipid Panel No results found for: \"CHOL\", \"CHOLPOCT\", \"CHLST\", \"CHOLV\", \"845106\", \"HDL\", \"HDLC\", \"LDL\", \"LDLC\", \"VLDLC\", \"VLDL\"   Pancreas No results for input(s): \"LIPASE\" in the last 72 hours.,No results for input(s):  \"AMYLASE\" in the last 72 hours.   Liver Enzymes No results for input(s): \"TP\" in the last 72 hours.    Invalid input(s): \"ALB\", \"TBIL\", \"AP\", \"SGOT\", \"GPT\", \"DBIL\"   Thyroid Studies Lab Results   Component Value Date/Time    TSH 0.691 07/09/2025 04:02 PM        Procedures/imaging: see electronic medical records for all procedures/Xrays and details which were not copied into this note but were reviewed prior to creation of Plan    TIME: E/M Time spent with patient and patient care issues: 55 mins.     This time also includes physician non-face-to-face service time visit on the date of service such as  Preparing to see the patient (eg, review of tests)  Obtaining and/or reviewing separately obtained history  Performing a medically necessary appropriate examination and/or evaluation  Counseling and educating the patient/family/caregiver  Ordering medications, tests, or procedures  Referring and communicating with other health care professionals as needed  Documenting clinical information in the electronic or other health record  Independently interpreting results (not reported separately) and communicating results to the patient/family/caregiver  Care coordination and discharge planning with Case Management.    Elements of old note were copied to maintain continuity and comprehensiveness of patient details. The assessment and plan has been reviewed and modified to reflect the most recent condition     Dear patient or family member or Caretaker, if you are reviewing this note and have a question regarding the medical terminology, please bring it with you to your next PCP visit.  Medical notes are meant to be a communication between medical professionals.  Additionally, portion of this note were created using voice recognition function, syntax and phonetic over may have escaped proofreading.

## 2025-07-11 NOTE — DISCHARGE SUMMARY
Hospitalist Discharge Summary    Patient: Alondra Fink MRN: [id number]  [facility]: [id number]    YOB: 1950  Age: 75 y.o.  Sex: female    DOA: 7/9/2025 LOS:  LOS: 2 days   Discharge Date:      Primary Care Provider:  Mateus Montano Sr., MD    Admission Diagnoses: Primary osteoarthritis of left knee [M17.12]  Atrial fibrillation with rapid ventricular response (HCC) [I48.91]  S/P total knee arthroplasty, left [Z96.652]  Atrial fibrillation (HCC) [I48.91]    Discharge Diagnoses:    Active Hospital Problems    Diagnosis     Atrial fibrillation with rapid ventricular response (HCC) [I48.91]     S/P total knee arthroplasty, left [Z96.652]     Atrial fibrillation (HCC) [I48.91]     Sleep apnea [G47.30]     Hypertension [I10]     RA (rheumatoid arthritis) (HCC) [M06.9]        Discharge Medications:        Medication List        START taking these medications      apixaban 5 MG Tabs tablet  Commonly known as: ELIQUIS  Take 1 tablet by mouth 2 times daily     buffered aspirin 325 MG Tabs  Take 1 tablet by mouth in the morning and at bedtime     dilTIAZem 240 MG extended release capsule  Commonly known as: CARDIZEM CD  Take 1 capsule by mouth daily     HYDROcodone-acetaminophen 5-325 MG per tablet  Commonly known as: NORCO  Take 1 tablet by mouth every 6 hours as needed for Pain for up to 7 days. Intended supply: 5 days. Take lowest dose possible to manage pain Max Daily Amount: 4 tablets            CONTINUE taking these medications      albuterol sulfate  (90 Base) MCG/ACT inhaler  Commonly known as: PROVENTIL;VENTOLIN;PROAIR     alendronate 35 MG tablet  Commonly known as: FOSAMAX     amitriptyline 25 MG tablet  Commonly known as: ELAVIL     Breo Ellipta 100-25 MCG/ACT inhaler  Generic drug: fluticasone furoate-vilanterol     carvedilol 6.25 MG tablet  Commonly known  listhesis L4/5. Slight degenerative anterior listhesis L5/S1. Severe chronic degenerative endplate marrow changes L5/S1. There is asymmetric right-sided degenerative facet marrow edema L5/S1 as well as dorsal STIR soft tissue hyperintensity adjacent to right side L5/S1 facet joint. No other marrow edema or neoplastic marrow signal. The conus medullaris is located at the L1 level and has a normal appearance and signal. There is solid-appearing osseous union visualized lower thoracic levels but no definite anterior osseous union with degenerative disc changes T12/L1. Solid-appearing osseous union L1-L5. Visualized upper sacrum unremarkable. Visualized retroperitoneum unremarkable. Stable small postop fluid collection at laminectomy site L1-L5 without any significant mass effect on the canal. T12/L1 shows facet arthropathy and degenerative spondylosis with moderate to severe left-sided and moderate right-sided foraminal stenosis without significant canal stenosis. L1/2 level: Previous dorsal decompression and posterior instrumentation with solid osseous union without significant stenosis. L2/3 level: Previous dorsal decompression and posterior instrumentation with solid osseous union without significant stenosis. L3/4 level: Previous dorsal decompression and posterior instrumentation with solid osseous union without significant stenosis. L4/5 level: Previous dorsal decompression and solid posterior osseous union with stable mild anterior listhesis without significant stenosis. L5/S1 level: Severe disc space narrowing and degenerative spondylosis with severe facet arthropathy with left-sided facet joint effusion with moderate to severe canal stenosis and severe left lateral recess stenosis. Mild right lateral recess stenosis. Very severe degenerative bilateral subarticular foraminal stenosis. _______________    : 1. Chronic anterior wedge compression fractures T11 and T12 vertebral bodies with solid acquired anterior

## 2025-07-11 NOTE — PROGRESS NOTES
Physical Therapy    1301: Pt reports she just got back from cardiac testing and wishes to rest.  Will follow up again for PT.    1407: Pt kaylynn, refusing PT, will follow up as schedule permits.

## 2025-07-11 NOTE — PROGRESS NOTES
Progress Note        Patient: Alondra Fink MRN: [id number]  SSN: [id number]    YOB: 1950  Age: 75 y.o.  Sex: female      2 Days Post-Op status post Procedure(s) (LRB):  LEFT TOTAL KNEE REPLACEMENT- MAKOPLASTY \"SPEC POP\" (Left)    Admit Date: 2025  Admit Diagnosis: Primary osteoarthritis of left knee [M17.12]  Atrial fibrillation with rapid ventricular response (HCC) [I48.91]  S/P total knee arthroplasty, left [Z96.652]  Atrial fibrillation (HCC) [I48.91]    Subjective:      Doing well.  No complaints.  No SOB.  No Chest Pain.  No Nausea or Vomiting.  No problems eating or voiding.    Objective:        Temp (24hrs), Av °F (36.7 °C), Min:97.7 °F (36.5 °C), Max:98.4 °F (36.9 °C)    Body mass index is 43.55 kg/m².  Patient Vitals for the past 12 hrs:   BP Temp Temp src Pulse Resp SpO2   25 0800 -- -- -- (!) 103 -- --   25 0727 135/83 97.9 °F (36.6 °C) Oral (!) 102 16 96 %   25 0400 (!) 145/87 98.4 °F (36.9 °C) Oral (!) 101 16 94 %   25 0300 -- -- -- (!) 103 -- --   25 0146 -- -- -- -- 18 --   25 0100 -- -- -- (!) 101 -- --   07/10/25 2300 -- -- -- (!) 102 -- --   07/10/25 2100 -- -- -- (!) 102 -- --     Recent Labs     25  0135   HGB 10.0*   HCT 30.7*   *   K 4.4   CL 95*   CO2 22   BUN 12       Physical Exam:  Vital Signs are Stable.   No Acute Distress.    Alert and Oriented.    Negative Homans’ sign.    Toes AROM Full.    Neurovascular exam is normal.    Dressing is Clean, Dry, and Intact.    Assessment/Plan:     Cards and Hospitalist following for post op a fib - ECHO scheduled for this morning  PT  Continue Ice and pain management.   Discharge planning per hospital team.      Signed By: Louie Arora PA-C     2025

## 2025-07-11 NOTE — PLAN OF CARE
Problem: Pain  Goal: Verbalizes/displays adequate comfort level or baseline comfort level  Outcome: Progressing     Problem: Safety - Adult  Goal: Free from fall injury  Outcome: Progressing     Problem: Discharge Planning  Goal: Discharge to home or other facility with appropriate resources  Outcome: Progressing  Flowsheets (Taken 7/10/2025 0855)  Discharge to home or other facility with appropriate resources:   Identify barriers to discharge with patient and caregiver   Arrange for needed discharge resources and transportation as appropriate   Identify discharge learning needs (meds, wound care, etc)   Refer to discharge planning if patient needs post-hospital services based on physician order or complex needs related to functional status, cognitive ability or social support system     Problem: ABCDS Injury Assessment  Goal: Absence of physical injury  Outcome: Progressing     Problem: Respiratory - Adult  Goal: Achieves optimal ventilation and oxygenation  Outcome: Progressing  Flowsheets (Taken 7/10/2025 0855)  Achieves optimal ventilation and oxygenation:   Assess for changes in respiratory status   Assess for changes in mentation and behavior   Position to facilitate oxygenation and minimize respiratory effort   Oxygen supplementation based on oxygen saturation or arterial blood gases   Encourage broncho-pulmonary hygiene including cough, deep breathe, incentive spirometry     Problem: Cardiovascular - Adult  Goal: Maintains optimal cardiac output and hemodynamic stability  Outcome: Progressing  Flowsheets (Taken 7/10/2025 0855)  Maintains optimal cardiac output and hemodynamic stability:   Monitor blood pressure and heart rate   Monitor urine output and notify Licensed Independent Practitioner for values outside of normal range   Assess for signs of decreased cardiac output  Goal: Absence of cardiac dysrhythmias or at baseline  Outcome: Progressing  Flowsheets (Taken 7/10/2025 0855)  Absence of cardiac

## 2025-07-14 NOTE — PROGRESS NOTES
Physician Progress Note      PATIENT:               ZABRINA ALEMAN  CSN #:                  [id number]  :                       1950  ADMIT DATE:       2025 7:27 AM  DISCH DATE:        2025 5:28 PM  RESPONDING  PROVIDER #:        Tre Cole MD          QUERY TEXT:    Based on your medical judgment, please clarify these findings and document if   any of the following are being evaluated and/or treated:    The clinical indicators include:  Age 75-year male, HTN, Obesity,    \"A-fib with RVR  Rate/Rhythm Control:  beta-blockers, calcium channel blockers,  to control the   heart rate and keep it within a safe range.  Assessment of Stroke Risk: [id number] 3- (Progress Notes 07/10/2025,Tre Cole MD)    \"Atrial fibrillation with RVR, new onset, heart rate is still in the 130s on   and off-  (Cardiology Consults 2025 Zain Gandhi MD )    \"Patient remained in atrial fibrillation with intermittent RVR overall   improving heart rate  I will increase the dose of Cardizem from 120 mg to 240 mg- (Progress Notes   07/10/2025  Zain Gandhi MD )    - Cardiology consult, Eliquis      Thank you  Grace Jiang [facility]  Options provided:  -- Secondary hypercoagulable state in a patient with atrial fibrillation  -- Other - I will add my own diagnosis  -- Disagree - Not applicable / Not valid  -- Disagree - Clinically unable to determine / Unknown  -- Refer to Clinical Documentation Reviewer    PROVIDER RESPONSE TEXT:    This patient has secondary hypercoagulable state in a patient with atrial   fibrillation.    Query created by: Grace Jiang on 2025 3:23 AM      Electronically signed by:  Tre Cole MD 2025 12:19 PM

## 2025-08-08 ENCOUNTER — HOSPITAL ENCOUNTER (OUTPATIENT)
Facility: HOSPITAL | Age: 75
Discharge: HOME | End: 2025-08-11
Payer: MEDICARE

## 2025-08-08 DIAGNOSIS — Z01.818 PREOP TESTING: ICD-10-CM

## 2025-08-08 PROCEDURE — 36415 COLL VENOUS BLD VENIPUNCTURE: CPT

## 2025-08-09 LAB
BACTERIA SPEC CULT: NORMAL
BACTERIA SPEC CULT: NORMAL
SERVICE CMNT-IMP: NORMAL

## 2025-08-11 ENCOUNTER — ANESTHESIA EVENT (OUTPATIENT)
Facility: HOSPITAL | Age: 75
End: 2025-08-11
Payer: MEDICARE

## 2025-08-13 ENCOUNTER — APPOINTMENT (OUTPATIENT)
Facility: HOSPITAL | Age: 75
End: 2025-08-13
Attending: ORTHOPAEDIC SURGERY
Payer: MEDICARE

## 2025-08-13 ENCOUNTER — HOSPITAL ENCOUNTER (OUTPATIENT)
Facility: HOSPITAL | Age: 75
Setting detail: OBSERVATION
Discharge: HOME HEALTH | End: 2025-08-14
Attending: ORTHOPAEDIC SURGERY | Admitting: ORTHOPAEDIC SURGERY
Payer: MEDICARE

## 2025-08-13 ENCOUNTER — ANESTHESIA (OUTPATIENT)
Facility: HOSPITAL | Age: 75
End: 2025-08-13
Payer: MEDICARE

## 2025-08-13 DIAGNOSIS — Z98.1 S/P LAMINECTOMY WITH SPINAL FUSION: ICD-10-CM

## 2025-08-13 DIAGNOSIS — Z01.818 PREOP TESTING: Primary | ICD-10-CM

## 2025-08-13 PROBLEM — M48.00 SPINAL STENOSIS: Status: ACTIVE | Noted: 2025-08-13

## 2025-08-13 LAB
ABO + RH BLD: NORMAL
BLOOD GROUP ANTIBODIES SERPL: NORMAL
SPECIMEN EXP DATE BLD: NORMAL

## 2025-08-13 PROCEDURE — 86900 BLOOD TYPING SEROLOGIC ABO: CPT

## 2025-08-13 PROCEDURE — 6360000002 HC RX W HCPCS: Performed by: ORTHOPAEDIC SURGERY

## 2025-08-13 PROCEDURE — 2709999900 HC NON-CHARGEABLE SUPPLY: Performed by: ORTHOPAEDIC SURGERY

## 2025-08-13 PROCEDURE — G0378 HOSPITAL OBSERVATION PER HR: HCPCS

## 2025-08-13 PROCEDURE — 2580000003 HC RX 258: Performed by: ORTHOPAEDIC SURGERY

## 2025-08-13 PROCEDURE — 2500000003 HC RX 250 WO HCPCS: Performed by: ORTHOPAEDIC SURGERY

## 2025-08-13 PROCEDURE — 7100000000 HC PACU RECOVERY - FIRST 15 MIN: Performed by: ORTHOPAEDIC SURGERY

## 2025-08-13 PROCEDURE — 3600000002 HC SURGERY LEVEL 2 BASE: Performed by: ORTHOPAEDIC SURGERY

## 2025-08-13 PROCEDURE — 36415 COLL VENOUS BLD VENIPUNCTURE: CPT

## 2025-08-13 PROCEDURE — 2500000003 HC RX 250 WO HCPCS: Performed by: NURSE ANESTHETIST, CERTIFIED REGISTERED

## 2025-08-13 PROCEDURE — 3600000012 HC SURGERY LEVEL 2 ADDTL 15MIN: Performed by: ORTHOPAEDIC SURGERY

## 2025-08-13 PROCEDURE — 7100000001 HC PACU RECOVERY - ADDTL 15 MIN: Performed by: ORTHOPAEDIC SURGERY

## 2025-08-13 PROCEDURE — 94640 AIRWAY INHALATION TREATMENT: CPT

## 2025-08-13 PROCEDURE — C1713 ANCHOR/SCREW BN/BN,TIS/BN: HCPCS | Performed by: ORTHOPAEDIC SURGERY

## 2025-08-13 PROCEDURE — 3700000001 HC ADD 15 MINUTES (ANESTHESIA): Performed by: ORTHOPAEDIC SURGERY

## 2025-08-13 PROCEDURE — 77002 NEEDLE LOCALIZATION BY XRAY: CPT

## 2025-08-13 PROCEDURE — 6370000000 HC RX 637 (ALT 250 FOR IP): Performed by: ORTHOPAEDIC SURGERY

## 2025-08-13 PROCEDURE — 6360000002 HC RX W HCPCS: Performed by: NURSE ANESTHETIST, CERTIFIED REGISTERED

## 2025-08-13 PROCEDURE — 86901 BLOOD TYPING SEROLOGIC RH(D): CPT

## 2025-08-13 PROCEDURE — 2720000010 HC SURG SUPPLY STERILE: Performed by: ORTHOPAEDIC SURGERY

## 2025-08-13 PROCEDURE — 3700000000 HC ANESTHESIA ATTENDED CARE: Performed by: ORTHOPAEDIC SURGERY

## 2025-08-13 PROCEDURE — 86850 RBC ANTIBODY SCREEN: CPT

## 2025-08-13 DEVICE — IMPLANTABLE DEVICE: Type: IMPLANTABLE DEVICE | Site: SPINE LUMBAR | Status: FUNCTIONAL

## 2025-08-13 DEVICE — SCREW SET EVEREST: Type: IMPLANTABLE DEVICE | Site: SPINE LUMBAR | Status: FUNCTIONAL

## 2025-08-13 DEVICE — KIT BNE GRFT XSM 1.4CC RHBMP-2 ABSRB CLLGN SPNG INFUSE: Type: IMPLANTABLE DEVICE | Site: SPINE LUMBAR | Status: FUNCTIONAL

## 2025-08-13 RX ORDER — ONDANSETRON 2 MG/ML
4 INJECTION INTRAMUSCULAR; INTRAVENOUS
Status: DISCONTINUED | OUTPATIENT
Start: 2025-08-13 | End: 2025-08-13 | Stop reason: HOSPADM

## 2025-08-13 RX ORDER — OXYCODONE HYDROCHLORIDE 5 MG/1
5 TABLET ORAL EVERY 6 HOURS PRN
Qty: 28 TABLET | Refills: 0 | Status: SHIPPED | OUTPATIENT
Start: 2025-08-13 | End: 2025-08-20

## 2025-08-13 RX ORDER — MIDAZOLAM HYDROCHLORIDE 2 MG/2ML
2 INJECTION, SOLUTION INTRAMUSCULAR; INTRAVENOUS
Status: DISCONTINUED | OUTPATIENT
Start: 2025-08-13 | End: 2025-08-13 | Stop reason: HOSPADM

## 2025-08-13 RX ORDER — ACETAMINOPHEN 325 MG/1
650 TABLET ORAL EVERY 6 HOURS
Status: DISCONTINUED | OUTPATIENT
Start: 2025-08-13 | End: 2025-08-14 | Stop reason: HOSPADM

## 2025-08-13 RX ORDER — MEPERIDINE HYDROCHLORIDE 50 MG/ML
12.5 INJECTION INTRAMUSCULAR; INTRAVENOUS; SUBCUTANEOUS EVERY 5 MIN PRN
Status: DISCONTINUED | OUTPATIENT
Start: 2025-08-13 | End: 2025-08-13 | Stop reason: HOSPADM

## 2025-08-13 RX ORDER — HYDROMORPHONE HYDROCHLORIDE 1 MG/ML
0.5 INJECTION, SOLUTION INTRAMUSCULAR; INTRAVENOUS; SUBCUTANEOUS EVERY 5 MIN PRN
Status: DISCONTINUED | OUTPATIENT
Start: 2025-08-13 | End: 2025-08-13 | Stop reason: HOSPADM

## 2025-08-13 RX ORDER — LIDOCAINE HYDROCHLORIDE 20 MG/ML
INJECTION, SOLUTION EPIDURAL; INFILTRATION; INTRACAUDAL; PERINEURAL
Status: DISCONTINUED | OUTPATIENT
Start: 2025-08-13 | End: 2025-08-13 | Stop reason: SDUPTHER

## 2025-08-13 RX ORDER — TRANEXAMIC ACID 10 MG/ML
1000 INJECTION, SOLUTION INTRAVENOUS ONCE
Status: COMPLETED | OUTPATIENT
Start: 2025-08-13 | End: 2025-08-13

## 2025-08-13 RX ORDER — ASPIRIN 81 MG/1
81 TABLET ORAL DAILY
Status: DISCONTINUED | OUTPATIENT
Start: 2025-08-13 | End: 2025-08-14 | Stop reason: HOSPADM

## 2025-08-13 RX ORDER — ALBUTEROL SULFATE 0.83 MG/ML
SOLUTION RESPIRATORY (INHALATION) EVERY 6 HOURS PRN
Status: DISCONTINUED | OUTPATIENT
Start: 2025-08-13 | End: 2025-08-14 | Stop reason: HOSPADM

## 2025-08-13 RX ORDER — MEMANTINE HYDROCHLORIDE 5 MG/1
10 TABLET ORAL EVERY EVENING
Status: DISCONTINUED | OUTPATIENT
Start: 2025-08-13 | End: 2025-08-14 | Stop reason: HOSPADM

## 2025-08-13 RX ORDER — DULOXETIN HYDROCHLORIDE 30 MG/1
60 CAPSULE, DELAYED RELEASE ORAL 2 TIMES DAILY
Status: DISCONTINUED | OUTPATIENT
Start: 2025-08-13 | End: 2025-08-14 | Stop reason: HOSPADM

## 2025-08-13 RX ORDER — PREDNISONE 5 MG/1
5 TABLET ORAL
Status: DISCONTINUED | OUTPATIENT
Start: 2025-08-14 | End: 2025-08-14 | Stop reason: HOSPADM

## 2025-08-13 RX ORDER — MIDAZOLAM HYDROCHLORIDE 1 MG/ML
INJECTION, SOLUTION INTRAMUSCULAR; INTRAVENOUS
Status: DISCONTINUED | OUTPATIENT
Start: 2025-08-13 | End: 2025-08-13 | Stop reason: SDUPTHER

## 2025-08-13 RX ORDER — METAXALONE 800 MG/1
800 TABLET ORAL EVERY 8 HOURS PRN
Status: DISCONTINUED | OUTPATIENT
Start: 2025-08-13 | End: 2025-08-14 | Stop reason: HOSPADM

## 2025-08-13 RX ORDER — PROPOFOL 10 MG/ML
INJECTION, EMULSION INTRAVENOUS
Status: DISCONTINUED | OUTPATIENT
Start: 2025-08-13 | End: 2025-08-13 | Stop reason: SDUPTHER

## 2025-08-13 RX ORDER — SODIUM CHLORIDE 1 G/1
1 TABLET ORAL 2 TIMES DAILY WITH MEALS
Status: DISCONTINUED | OUTPATIENT
Start: 2025-08-13 | End: 2025-08-14 | Stop reason: HOSPADM

## 2025-08-13 RX ORDER — ONDANSETRON 2 MG/ML
INJECTION INTRAMUSCULAR; INTRAVENOUS
Status: DISCONTINUED | OUTPATIENT
Start: 2025-08-13 | End: 2025-08-13 | Stop reason: SDUPTHER

## 2025-08-13 RX ORDER — OXYCODONE HYDROCHLORIDE 5 MG/1
10 TABLET ORAL EVERY 4 HOURS PRN
Status: DISCONTINUED | OUTPATIENT
Start: 2025-08-13 | End: 2025-08-14 | Stop reason: HOSPADM

## 2025-08-13 RX ORDER — FOLIC ACID 1 MG/1
1 TABLET ORAL 3 TIMES DAILY
Status: DISCONTINUED | OUTPATIENT
Start: 2025-08-13 | End: 2025-08-14 | Stop reason: HOSPADM

## 2025-08-13 RX ORDER — LABETALOL HYDROCHLORIDE 5 MG/ML
5 INJECTION, SOLUTION INTRAVENOUS EVERY 10 MIN PRN
Status: DISCONTINUED | OUTPATIENT
Start: 2025-08-13 | End: 2025-08-13 | Stop reason: HOSPADM

## 2025-08-13 RX ORDER — ONDANSETRON 2 MG/ML
4 INJECTION INTRAMUSCULAR; INTRAVENOUS EVERY 6 HOURS PRN
Status: DISCONTINUED | OUTPATIENT
Start: 2025-08-13 | End: 2025-08-14 | Stop reason: HOSPADM

## 2025-08-13 RX ORDER — LEVOTHYROXINE SODIUM 75 UG/1
150 TABLET ORAL
Status: DISCONTINUED | OUTPATIENT
Start: 2025-08-14 | End: 2025-08-14 | Stop reason: HOSPADM

## 2025-08-13 RX ORDER — FAMOTIDINE 20 MG/1
20 TABLET, FILM COATED ORAL 2 TIMES DAILY
Status: DISCONTINUED | OUTPATIENT
Start: 2025-08-13 | End: 2025-08-14 | Stop reason: HOSPADM

## 2025-08-13 RX ORDER — PANTOPRAZOLE SODIUM 40 MG/1
40 TABLET, DELAYED RELEASE ORAL
Status: DISCONTINUED | OUTPATIENT
Start: 2025-08-14 | End: 2025-08-14 | Stop reason: HOSPADM

## 2025-08-13 RX ORDER — CETIRIZINE HYDROCHLORIDE 10 MG/1
10 TABLET ORAL NIGHTLY
Status: DISCONTINUED | OUTPATIENT
Start: 2025-08-13 | End: 2025-08-14 | Stop reason: HOSPADM

## 2025-08-13 RX ORDER — TRANEXAMIC ACID 10 MG/ML
1000 INJECTION, SOLUTION INTRAVENOUS ONCE
Status: DISCONTINUED | OUTPATIENT
Start: 2025-08-13 | End: 2025-08-14 | Stop reason: HOSPADM

## 2025-08-13 RX ORDER — DIPHENHYDRAMINE HCL 25 MG
25 CAPSULE ORAL EVERY 6 HOURS PRN
Status: DISCONTINUED | OUTPATIENT
Start: 2025-08-13 | End: 2025-08-14 | Stop reason: HOSPADM

## 2025-08-13 RX ORDER — CYANOCOBALAMIN 1000 UG/ML
1000 INJECTION, SOLUTION INTRAMUSCULAR; SUBCUTANEOUS
Status: CANCELLED | OUTPATIENT
Start: 2025-08-13

## 2025-08-13 RX ORDER — VANCOMYCIN HYDROCHLORIDE 1 G/20ML
INJECTION, POWDER, LYOPHILIZED, FOR SOLUTION INTRAVENOUS PRN
Status: DISCONTINUED | OUTPATIENT
Start: 2025-08-13 | End: 2025-08-13 | Stop reason: ALTCHOICE

## 2025-08-13 RX ORDER — CARVEDILOL 3.12 MG/1
6.25 TABLET ORAL 2 TIMES DAILY WITH MEALS
Status: DISCONTINUED | OUTPATIENT
Start: 2025-08-13 | End: 2025-08-14 | Stop reason: HOSPADM

## 2025-08-13 RX ORDER — ONDANSETRON 4 MG/1
4 TABLET, ORALLY DISINTEGRATING ORAL EVERY 8 HOURS PRN
Status: DISCONTINUED | OUTPATIENT
Start: 2025-08-13 | End: 2025-08-14 | Stop reason: HOSPADM

## 2025-08-13 RX ORDER — PROCHLORPERAZINE EDISYLATE 5 MG/ML
5 INJECTION INTRAMUSCULAR; INTRAVENOUS
Status: DISCONTINUED | OUTPATIENT
Start: 2025-08-13 | End: 2025-08-13 | Stop reason: HOSPADM

## 2025-08-13 RX ORDER — DIPHENHYDRAMINE HYDROCHLORIDE 50 MG/ML
12.5 INJECTION, SOLUTION INTRAMUSCULAR; INTRAVENOUS
Status: DISCONTINUED | OUTPATIENT
Start: 2025-08-13 | End: 2025-08-13 | Stop reason: HOSPADM

## 2025-08-13 RX ORDER — PHENYLEPHRINE HCL IN 0.9% NACL 1 MG/10 ML
SYRINGE (ML) INTRAVENOUS
Status: DISCONTINUED | OUTPATIENT
Start: 2025-08-13 | End: 2025-08-13 | Stop reason: SDUPTHER

## 2025-08-13 RX ORDER — ACETAMINOPHEN 500 MG
1000 TABLET ORAL ONCE
Status: DISCONTINUED | OUTPATIENT
Start: 2025-08-13 | End: 2025-08-13 | Stop reason: HOSPADM

## 2025-08-13 RX ORDER — DIPHENHYDRAMINE HYDROCHLORIDE 50 MG/ML
25 INJECTION, SOLUTION INTRAMUSCULAR; INTRAVENOUS EVERY 6 HOURS PRN
Status: DISCONTINUED | OUTPATIENT
Start: 2025-08-13 | End: 2025-08-14 | Stop reason: HOSPADM

## 2025-08-13 RX ORDER — SODIUM CHLORIDE, SODIUM LACTATE, POTASSIUM CHLORIDE, CALCIUM CHLORIDE 600; 310; 30; 20 MG/100ML; MG/100ML; MG/100ML; MG/100ML
INJECTION, SOLUTION INTRAVENOUS CONTINUOUS
Status: DISCONTINUED | OUTPATIENT
Start: 2025-08-13 | End: 2025-08-14 | Stop reason: HOSPADM

## 2025-08-13 RX ORDER — SODIUM CHLORIDE 0.9 % (FLUSH) 0.9 %
5-40 SYRINGE (ML) INJECTION PRN
Status: DISCONTINUED | OUTPATIENT
Start: 2025-08-13 | End: 2025-08-13 | Stop reason: HOSPADM

## 2025-08-13 RX ORDER — FENTANYL CITRATE 50 UG/ML
25 INJECTION, SOLUTION INTRAMUSCULAR; INTRAVENOUS EVERY 5 MIN PRN
Status: DISCONTINUED | OUTPATIENT
Start: 2025-08-13 | End: 2025-08-13 | Stop reason: HOSPADM

## 2025-08-13 RX ORDER — DONEPEZIL HYDROCHLORIDE 5 MG/1
5 TABLET, FILM COATED ORAL NIGHTLY
Status: DISCONTINUED | OUTPATIENT
Start: 2025-08-13 | End: 2025-08-14 | Stop reason: HOSPADM

## 2025-08-13 RX ORDER — VANCOMYCIN HYDROCHLORIDE
1500 EVERY 12 HOURS
Status: DISCONTINUED | OUTPATIENT
Start: 2025-08-13 | End: 2025-08-13 | Stop reason: SDUPTHER

## 2025-08-13 RX ORDER — ALENDRONATE SODIUM 35 MG/1
35 TABLET ORAL
Status: DISCONTINUED | OUTPATIENT
Start: 2025-08-14 | End: 2025-08-14 | Stop reason: HOSPADM

## 2025-08-13 RX ORDER — POLYETHYLENE GLYCOL 3350 17 G/17G
17 POWDER, FOR SOLUTION ORAL DAILY
Status: DISCONTINUED | OUTPATIENT
Start: 2025-08-13 | End: 2025-08-14 | Stop reason: HOSPADM

## 2025-08-13 RX ORDER — SODIUM CHLORIDE 9 MG/ML
INJECTION, SOLUTION INTRAVENOUS PRN
Status: DISCONTINUED | OUTPATIENT
Start: 2025-08-13 | End: 2025-08-13 | Stop reason: HOSPADM

## 2025-08-13 RX ORDER — SODIUM CHLORIDE 450 MG/100ML
INJECTION, SOLUTION INTRAVENOUS CONTINUOUS
Status: DISCONTINUED | OUTPATIENT
Start: 2025-08-13 | End: 2025-08-14 | Stop reason: HOSPADM

## 2025-08-13 RX ORDER — HYDROMORPHONE HYDROCHLORIDE 1 MG/ML
0.5 INJECTION, SOLUTION INTRAMUSCULAR; INTRAVENOUS; SUBCUTANEOUS
Status: DISCONTINUED | OUTPATIENT
Start: 2025-08-13 | End: 2025-08-14 | Stop reason: HOSPADM

## 2025-08-13 RX ORDER — OXYCODONE HYDROCHLORIDE 5 MG/1
5 TABLET ORAL PRN
Status: DISCONTINUED | OUTPATIENT
Start: 2025-08-13 | End: 2025-08-13 | Stop reason: HOSPADM

## 2025-08-13 RX ORDER — CELECOXIB 100 MG/1
100 CAPSULE ORAL 2 TIMES DAILY
Status: DISCONTINUED | OUTPATIENT
Start: 2025-08-13 | End: 2025-08-14 | Stop reason: HOSPADM

## 2025-08-13 RX ORDER — GABAPENTIN 400 MG/1
800 CAPSULE ORAL 3 TIMES DAILY
Status: DISCONTINUED | OUTPATIENT
Start: 2025-08-13 | End: 2025-08-14 | Stop reason: HOSPADM

## 2025-08-13 RX ORDER — BISACODYL 5 MG/1
5 TABLET, DELAYED RELEASE ORAL DAILY
Status: DISCONTINUED | OUTPATIENT
Start: 2025-08-13 | End: 2025-08-14 | Stop reason: HOSPADM

## 2025-08-13 RX ORDER — OXYCODONE HYDROCHLORIDE 5 MG/1
10 TABLET ORAL PRN
Status: DISCONTINUED | OUTPATIENT
Start: 2025-08-13 | End: 2025-08-13 | Stop reason: HOSPADM

## 2025-08-13 RX ORDER — LISINOPRIL 5 MG/1
5 TABLET ORAL EVERY MORNING
Status: DISCONTINUED | OUTPATIENT
Start: 2025-08-14 | End: 2025-08-14 | Stop reason: HOSPADM

## 2025-08-13 RX ORDER — OXYCODONE HYDROCHLORIDE 5 MG/1
5 TABLET ORAL EVERY 4 HOURS PRN
Status: DISCONTINUED | OUTPATIENT
Start: 2025-08-13 | End: 2025-08-14 | Stop reason: HOSPADM

## 2025-08-13 RX ORDER — DEXAMETHASONE SODIUM PHOSPHATE 4 MG/ML
INJECTION, SOLUTION INTRA-ARTICULAR; INTRALESIONAL; INTRAMUSCULAR; INTRAVENOUS; SOFT TISSUE
Status: DISCONTINUED | OUTPATIENT
Start: 2025-08-13 | End: 2025-08-13 | Stop reason: SDUPTHER

## 2025-08-13 RX ORDER — SODIUM CHLORIDE 0.9 % (FLUSH) 0.9 %
5-40 SYRINGE (ML) INJECTION EVERY 12 HOURS SCHEDULED
Status: DISCONTINUED | OUTPATIENT
Start: 2025-08-13 | End: 2025-08-14 | Stop reason: HOSPADM

## 2025-08-13 RX ORDER — FENTANYL CITRATE 50 UG/ML
INJECTION, SOLUTION INTRAMUSCULAR; INTRAVENOUS
Status: DISCONTINUED | OUTPATIENT
Start: 2025-08-13 | End: 2025-08-13 | Stop reason: SDUPTHER

## 2025-08-13 RX ORDER — DILTIAZEM HYDROCHLORIDE 240 MG/1
240 CAPSULE, COATED, EXTENDED RELEASE ORAL EVERY MORNING
Status: DISCONTINUED | OUTPATIENT
Start: 2025-08-14 | End: 2025-08-14 | Stop reason: HOSPADM

## 2025-08-13 RX ORDER — SODIUM CHLORIDE 0.9 % (FLUSH) 0.9 %
5-40 SYRINGE (ML) INJECTION EVERY 12 HOURS SCHEDULED
Status: DISCONTINUED | OUTPATIENT
Start: 2025-08-13 | End: 2025-08-13 | Stop reason: HOSPADM

## 2025-08-13 RX ORDER — ESCITALOPRAM OXALATE 10 MG/1
20 TABLET ORAL EVERY MORNING
Status: DISCONTINUED | OUTPATIENT
Start: 2025-08-14 | End: 2025-08-14 | Stop reason: HOSPADM

## 2025-08-13 RX ORDER — ACETAMINOPHEN 500 MG
1000 TABLET ORAL EVERY 6 HOURS PRN
Status: DISCONTINUED | OUTPATIENT
Start: 2025-08-13 | End: 2025-08-14 | Stop reason: HOSPADM

## 2025-08-13 RX ORDER — ROCURONIUM BROMIDE 10 MG/ML
INJECTION, SOLUTION INTRAVENOUS
Status: DISCONTINUED | OUTPATIENT
Start: 2025-08-13 | End: 2025-08-13 | Stop reason: SDUPTHER

## 2025-08-13 RX ORDER — HYDROMORPHONE HYDROCHLORIDE 1 MG/ML
0.25 INJECTION, SOLUTION INTRAMUSCULAR; INTRAVENOUS; SUBCUTANEOUS
Status: DISCONTINUED | OUTPATIENT
Start: 2025-08-13 | End: 2025-08-14 | Stop reason: HOSPADM

## 2025-08-13 RX ADMIN — Medication 100 MCG: at 10:51

## 2025-08-13 RX ADMIN — ROCURONIUM BROMIDE 20 MG: 10 INJECTION, SOLUTION INTRAVENOUS at 11:23

## 2025-08-13 RX ADMIN — OXYCODONE HYDROCHLORIDE 5 MG: 5 TABLET ORAL at 23:37

## 2025-08-13 RX ADMIN — SUGAMMADEX 100 MG: 100 INJECTION, SOLUTION INTRAVENOUS at 12:43

## 2025-08-13 RX ADMIN — ONDANSETRON 4 MG: 2 INJECTION INTRAMUSCULAR; INTRAVENOUS at 12:27

## 2025-08-13 RX ADMIN — Medication 100 MCG: at 10:13

## 2025-08-13 RX ADMIN — FAMOTIDINE 20 MG: 20 TABLET, FILM COATED ORAL at 21:23

## 2025-08-13 RX ADMIN — HYDROMORPHONE HYDROCHLORIDE 0.25 MG: 1 INJECTION, SOLUTION INTRAMUSCULAR; INTRAVENOUS; SUBCUTANEOUS at 10:51

## 2025-08-13 RX ADMIN — Medication 100 MCG: at 11:39

## 2025-08-13 RX ADMIN — BISACODYL 5 MG: 5 TABLET, COATED ORAL at 16:59

## 2025-08-13 RX ADMIN — GABAPENTIN 800 MG: 400 CAPSULE ORAL at 21:35

## 2025-08-13 RX ADMIN — HYDROMORPHONE HYDROCHLORIDE 0.25 MG: 1 INJECTION, SOLUTION INTRAMUSCULAR; INTRAVENOUS; SUBCUTANEOUS at 12:29

## 2025-08-13 RX ADMIN — MEMANTINE 10 MG: 5 TABLET ORAL at 16:58

## 2025-08-13 RX ADMIN — CETIRIZINE HYDROCHLORIDE 10 MG: 10 TABLET, FILM COATED ORAL at 21:23

## 2025-08-13 RX ADMIN — SODIUM CHLORIDE, SODIUM LACTATE, POTASSIUM CHLORIDE, AND CALCIUM CHLORIDE: .6; .31; .03; .02 INJECTION, SOLUTION INTRAVENOUS at 08:46

## 2025-08-13 RX ADMIN — Medication 100 MCG: at 11:24

## 2025-08-13 RX ADMIN — SUGAMMADEX 100 MG: 100 INJECTION, SOLUTION INTRAVENOUS at 12:54

## 2025-08-13 RX ADMIN — Medication 200 MCG: at 11:31

## 2025-08-13 RX ADMIN — Medication 200 MCG: at 11:17

## 2025-08-13 RX ADMIN — ACETAMINOPHEN 650 MG: 325 TABLET ORAL at 16:58

## 2025-08-13 RX ADMIN — ROCURONIUM BROMIDE 50 MG: 10 INJECTION, SOLUTION INTRAVENOUS at 10:12

## 2025-08-13 RX ADMIN — LIDOCAINE HYDROCHLORIDE 100 MG: 20 INJECTION, SOLUTION EPIDURAL; INFILTRATION; INTRACAUDAL; PERINEURAL at 10:11

## 2025-08-13 RX ADMIN — DULOXETINE 60 MG: 30 CAPSULE, DELAYED RELEASE ORAL at 16:58

## 2025-08-13 RX ADMIN — CARVEDILOL 6.25 MG: 3.12 TABLET, FILM COATED ORAL at 16:58

## 2025-08-13 RX ADMIN — DONEPEZIL HYDROCHLORIDE 5 MG: 5 TABLET, FILM COATED ORAL at 21:24

## 2025-08-13 RX ADMIN — SODIUM CHLORIDE, SODIUM LACTATE, POTASSIUM CHLORIDE, AND CALCIUM CHLORIDE: .6; .31; .03; .02 INJECTION, SOLUTION INTRAVENOUS at 13:31

## 2025-08-13 RX ADMIN — POLYETHYLENE GLYCOL 3350 17 G: 17 POWDER, FOR SOLUTION ORAL at 16:58

## 2025-08-13 RX ADMIN — MIDAZOLAM 2 MG: 1 INJECTION INTRAMUSCULAR; INTRAVENOUS at 10:07

## 2025-08-13 RX ADMIN — SODIUM CHLORIDE: 0.45 INJECTION, SOLUTION INTRAVENOUS at 14:21

## 2025-08-13 RX ADMIN — OXYCODONE HYDROCHLORIDE 5 MG: 5 TABLET ORAL at 16:59

## 2025-08-13 RX ADMIN — DEXAMETHASONE SODIUM PHOSPHATE 4 MG: 4 INJECTION INTRA-ARTICULAR; INTRALESIONAL; INTRAMUSCULAR; INTRAVENOUS; SOFT TISSUE at 10:41

## 2025-08-13 RX ADMIN — VANCOMYCIN HYDROCHLORIDE 1500 MG: 10 INJECTION, POWDER, LYOPHILIZED, FOR SOLUTION INTRAVENOUS at 21:39

## 2025-08-13 RX ADMIN — SODIUM CHLORIDE, PRESERVATIVE FREE 10 ML: 5 INJECTION INTRAVENOUS at 21:24

## 2025-08-13 RX ADMIN — PROPOFOL 140 MG: 10 INJECTION, EMULSION INTRAVENOUS at 10:12

## 2025-08-13 RX ADMIN — CELECOXIB 100 MG: 100 CAPSULE ORAL at 16:58

## 2025-08-13 RX ADMIN — Medication 100 MCG: at 11:03

## 2025-08-13 RX ADMIN — ACETAMINOPHEN 650 MG: 325 TABLET ORAL at 21:22

## 2025-08-13 RX ADMIN — TRANEXAMIC ACID 1000 MG: 10 INJECTION, SOLUTION INTRAVENOUS at 12:23

## 2025-08-13 RX ADMIN — FENTANYL CITRATE 50 MCG: 50 INJECTION INTRAMUSCULAR; INTRAVENOUS at 13:06

## 2025-08-13 RX ADMIN — ASPIRIN 81 MG: 81 TABLET, COATED ORAL at 16:58

## 2025-08-13 RX ADMIN — Medication 200 MCG: at 11:12

## 2025-08-13 RX ADMIN — Medication 100 MCG: at 11:00

## 2025-08-13 RX ADMIN — FAMOTIDINE 20 MG: 20 TABLET, FILM COATED ORAL at 16:59

## 2025-08-13 RX ADMIN — Medication 100 MCG: at 11:07

## 2025-08-13 RX ADMIN — VANCOMYCIN HYDROCHLORIDE 1500 MG: 10 INJECTION, POWDER, LYOPHILIZED, FOR SOLUTION INTRAVENOUS at 09:44

## 2025-08-13 RX ADMIN — FENTANYL CITRATE 50 MCG: 50 INJECTION INTRAMUSCULAR; INTRAVENOUS at 10:11

## 2025-08-13 RX ADMIN — Medication 1 G: at 16:58

## 2025-08-13 RX ADMIN — ARFORMOTEROL TARTRATE: 15 SOLUTION RESPIRATORY (INHALATION) at 21:03

## 2025-08-13 RX ADMIN — TRANEXAMIC ACID 1000 MG: 10 INJECTION, SOLUTION INTRAVENOUS at 10:32

## 2025-08-13 RX ADMIN — HYDROMORPHONE HYDROCHLORIDE 0.25 MG: 1 INJECTION, SOLUTION INTRAMUSCULAR; INTRAVENOUS; SUBCUTANEOUS at 11:06

## 2025-08-13 RX ADMIN — HYDROMORPHONE HYDROCHLORIDE 0.25 MG: 1 INJECTION, SOLUTION INTRAMUSCULAR; INTRAVENOUS; SUBCUTANEOUS at 12:18

## 2025-08-13 RX ADMIN — FOLIC ACID 1 MG: 1 TABLET ORAL at 16:58

## 2025-08-13 ASSESSMENT — PAIN SCALES - GENERAL
PAINLEVEL_OUTOF10: 0
PAINLEVEL_OUTOF10: 4
PAINLEVEL_OUTOF10: 0
PAINLEVEL_OUTOF10: 8
PAINLEVEL_OUTOF10: 0
PAINLEVEL_OUTOF10: 9
PAINLEVEL_OUTOF10: 8
PAINLEVEL_OUTOF10: 0

## 2025-08-13 ASSESSMENT — PAIN - FUNCTIONAL ASSESSMENT
PAIN_FUNCTIONAL_ASSESSMENT: 0-10

## 2025-08-13 ASSESSMENT — ENCOUNTER SYMPTOMS: SHORTNESS OF BREATH: 1

## 2025-08-13 ASSESSMENT — PAIN DESCRIPTION - LOCATION
LOCATION: BACK
LOCATION: BACK
LOCATION: BACK;ANKLE;NECK

## 2025-08-13 ASSESSMENT — PAIN DESCRIPTION - DESCRIPTORS
DESCRIPTORS: ACHING;THROBBING
DESCRIPTORS: ACHING
DESCRIPTORS: SHARP

## 2025-08-13 ASSESSMENT — PAIN DESCRIPTION - ORIENTATION: ORIENTATION: RIGHT;LEFT

## 2025-08-14 VITALS
SYSTOLIC BLOOD PRESSURE: 140 MMHG | BODY MASS INDEX: 42.33 KG/M2 | RESPIRATION RATE: 18 BRPM | HEIGHT: 62 IN | DIASTOLIC BLOOD PRESSURE: 85 MMHG | WEIGHT: 230 LBS | HEART RATE: 107 BPM | OXYGEN SATURATION: 96 % | TEMPERATURE: 97.9 F

## 2025-08-14 LAB
ANION GAP SERPL CALC-SCNC: 12 MMOL/L (ref 3–18)
BUN SERPL-MCNC: 10 MG/DL (ref 6–23)
BUN/CREAT SERPL: 17 (ref 12–20)
CALCIUM SERPL-MCNC: 9 MG/DL (ref 8.5–10.1)
CHLORIDE SERPL-SCNC: 98 MMOL/L (ref 98–107)
CO2 SERPL-SCNC: 23 MMOL/L (ref 21–32)
CREAT SERPL-MCNC: 0.57 MG/DL (ref 0.6–1.3)
ERYTHROCYTE [DISTWIDTH] IN BLOOD BY AUTOMATED COUNT: 13.8 % (ref 11.6–14.5)
GLUCOSE SERPL-MCNC: 125 MG/DL (ref 74–108)
HCT VFR BLD AUTO: 27.7 % (ref 35–45)
HGB BLD-MCNC: 8.9 G/DL (ref 12–16)
MCH RBC QN AUTO: 31.7 PG (ref 24–34)
MCHC RBC AUTO-ENTMCNC: 32.1 G/DL (ref 31–37)
MCV RBC AUTO: 98.6 FL (ref 78–100)
NRBC # BLD: 0 K/UL (ref 0–0.01)
NRBC BLD-RTO: 0 PER 100 WBC
PLATELET # BLD AUTO: 286 K/UL (ref 135–420)
PMV BLD AUTO: 9.8 FL (ref 9.2–11.8)
POTASSIUM SERPL-SCNC: 4.7 MMOL/L (ref 3.5–5.5)
RBC # BLD AUTO: 2.81 M/UL (ref 4.2–5.3)
SODIUM SERPL-SCNC: 133 MMOL/L (ref 136–145)
WBC # BLD AUTO: 13.3 K/UL (ref 4.6–13.2)

## 2025-08-14 PROCEDURE — 6360000002 HC RX W HCPCS: Performed by: ORTHOPAEDIC SURGERY

## 2025-08-14 PROCEDURE — 97535 SELF CARE MNGMENT TRAINING: CPT

## 2025-08-14 PROCEDURE — 97116 GAIT TRAINING THERAPY: CPT

## 2025-08-14 PROCEDURE — 97161 PT EVAL LOW COMPLEX 20 MIN: CPT

## 2025-08-14 PROCEDURE — 94640 AIRWAY INHALATION TREATMENT: CPT

## 2025-08-14 PROCEDURE — 36415 COLL VENOUS BLD VENIPUNCTURE: CPT

## 2025-08-14 PROCEDURE — 85027 COMPLETE CBC AUTOMATED: CPT

## 2025-08-14 PROCEDURE — 96374 THER/PROPH/DIAG INJ IV PUSH: CPT

## 2025-08-14 PROCEDURE — G0378 HOSPITAL OBSERVATION PER HR: HCPCS

## 2025-08-14 PROCEDURE — 6370000000 HC RX 637 (ALT 250 FOR IP): Performed by: ORTHOPAEDIC SURGERY

## 2025-08-14 PROCEDURE — 80048 BASIC METABOLIC PNL TOTAL CA: CPT

## 2025-08-14 PROCEDURE — 97165 OT EVAL LOW COMPLEX 30 MIN: CPT

## 2025-08-14 RX ADMIN — ALENDRONATE SODIUM 35 MG: 35 TABLET ORAL at 06:28

## 2025-08-14 RX ADMIN — FOLIC ACID 1 MG: 1 TABLET ORAL at 10:50

## 2025-08-14 RX ADMIN — DIPHENHYDRAMINE HYDROCHLORIDE 25 MG: 25 CAPSULE ORAL at 03:07

## 2025-08-14 RX ADMIN — CELECOXIB 100 MG: 100 CAPSULE ORAL at 10:53

## 2025-08-14 RX ADMIN — PREDNISONE 5 MG: 5 TABLET ORAL at 10:53

## 2025-08-14 RX ADMIN — FAMOTIDINE 20 MG: 20 TABLET, FILM COATED ORAL at 10:50

## 2025-08-14 RX ADMIN — ASPIRIN 81 MG: 81 TABLET, COATED ORAL at 10:50

## 2025-08-14 RX ADMIN — DULOXETINE 60 MG: 30 CAPSULE, DELAYED RELEASE ORAL at 10:50

## 2025-08-14 RX ADMIN — OXYCODONE HYDROCHLORIDE 10 MG: 5 TABLET ORAL at 10:55

## 2025-08-14 RX ADMIN — ACETAMINOPHEN 650 MG: 325 TABLET ORAL at 03:05

## 2025-08-14 RX ADMIN — ACETAMINOPHEN 650 MG: 325 TABLET ORAL at 10:51

## 2025-08-14 RX ADMIN — PANTOPRAZOLE SODIUM 40 MG: 40 TABLET, DELAYED RELEASE ORAL at 06:26

## 2025-08-14 RX ADMIN — HYDROMORPHONE HYDROCHLORIDE 0.5 MG: 1 INJECTION, SOLUTION INTRAMUSCULAR; INTRAVENOUS; SUBCUTANEOUS at 01:11

## 2025-08-14 RX ADMIN — LEVOTHYROXINE SODIUM 150 MCG: 0.07 TABLET ORAL at 06:26

## 2025-08-14 RX ADMIN — OXYCODONE HYDROCHLORIDE 10 MG: 5 TABLET ORAL at 06:27

## 2025-08-14 RX ADMIN — LISINOPRIL 5 MG: 5 TABLET ORAL at 10:52

## 2025-08-14 RX ADMIN — DILTIAZEM HYDROCHLORIDE 240 MG: 240 CAPSULE, EXTENDED RELEASE ORAL at 10:52

## 2025-08-14 RX ADMIN — ARFORMOTEROL TARTRATE: 15 SOLUTION RESPIRATORY (INHALATION) at 07:01

## 2025-08-14 RX ADMIN — CARVEDILOL 6.25 MG: 3.12 TABLET, FILM COATED ORAL at 11:57

## 2025-08-14 RX ADMIN — AMITRIPTYLINE HYDROCHLORIDE 25 MG: 25 TABLET ORAL at 10:52

## 2025-08-14 RX ADMIN — Medication 1 G: at 10:52

## 2025-08-14 RX ADMIN — BISACODYL 5 MG: 5 TABLET, COATED ORAL at 10:52

## 2025-08-14 RX ADMIN — POLYETHYLENE GLYCOL 3350 17 G: 17 POWDER, FOR SOLUTION ORAL at 11:57

## 2025-08-14 RX ADMIN — GABAPENTIN 800 MG: 400 CAPSULE ORAL at 10:50

## 2025-08-14 RX ADMIN — ESCITALOPRAM OXALATE 20 MG: 10 TABLET ORAL at 10:50

## 2025-08-14 ASSESSMENT — PAIN SCALES - GENERAL
PAINLEVEL_OUTOF10: 8
PAINLEVEL_OUTOF10: 10
PAINLEVEL_OUTOF10: 9
PAINLEVEL_OUTOF10: 4

## 2025-08-14 ASSESSMENT — PAIN DESCRIPTION - DESCRIPTORS
DESCRIPTORS: ACHING

## 2025-08-14 ASSESSMENT — PAIN DESCRIPTION - ORIENTATION
ORIENTATION: RIGHT;LEFT
ORIENTATION: RIGHT;LEFT
ORIENTATION: MID;LOWER
ORIENTATION: RIGHT;LEFT

## 2025-08-14 ASSESSMENT — PAIN - FUNCTIONAL ASSESSMENT
PAIN_FUNCTIONAL_ASSESSMENT: 0-10
PAIN_FUNCTIONAL_ASSESSMENT: ACTIVITIES ARE NOT PREVENTED

## 2025-08-14 ASSESSMENT — PAIN DESCRIPTION - LOCATION
LOCATION: BACK;NECK;ANKLE
LOCATION: BACK;ANKLE;NECK
LOCATION: BACK
LOCATION: ABDOMEN;BACK

## (undated) DEVICE — OSCILLATING TIP SAW CARTRIDGE: Brand: PRECISION FALCON

## (undated) DEVICE — PENCIL SMK EVAC TELSCP 3 M TBNG

## (undated) DEVICE — GARMENT COMPR M FOR 12-18IN CALF INTMIT SGL BLDR HEMO-FORCE

## (undated) DEVICE — NEEDLE HYPO 21GA L1.5IN INTUITIVE 1 HND DSGN BVL MAGELLAN

## (undated) DEVICE — DRESSING POST OPERATIVE 6X4IN VISIBLE OPSITE

## (undated) DEVICE — DRESSING FOAM POST OPERATIVE 30X10 CM MEPLIX BORDER

## (undated) DEVICE — ADHESIVE SKIN CLOSURE WND 8.661X1.5 IN 22 CM LIQUIBAND SECUR

## (undated) DEVICE — SUTURE MONOCRYL SZ 3-0 L27IN ABSRB UD L24MM PS-1 3/8 CIR PRIM Y936H

## (undated) DEVICE — ELECTRODE PT RET AD L9FT HI MOIST COND ADH HYDRGEL CORDED

## (undated) DEVICE — KIT TRK KNEE PROC VIZADISC

## (undated) DEVICE — APPLICATOR MEDICATED 26 CC SOLUTION HI LT ORNG CHLORAPREP

## (undated) DEVICE — SPONGE NEURO CTN WHT STRL XRAY DET 0.5X.05IN 10PK

## (undated) DEVICE — KIT DRP FOR RIO ROBOTIC ARM ASST SYS

## (undated) DEVICE — SOLUTION IRRIGATION LR 3000 ML USP TITAN XL CONTAINER 4/CA

## (undated) DEVICE — KIT INT FIX FEM TIB CKPT MAKOPLASTY

## (undated) DEVICE — Device

## (undated) DEVICE — SOLUTION IRRIG 500ML 0.9% SOD CHLO USP POUR PLAS BTL

## (undated) DEVICE — GLOVE ORANGE PI 8   MSG9080

## (undated) DEVICE — CONTAINER SPEC 4.5OZ POLYPR ST

## (undated) DEVICE — TUBING FLD MGMT Y DBL SPIK DUALWAVE

## (undated) DEVICE — TUBING SCTION CONN 1/4X12 RIB

## (undated) DEVICE — SURGIFOAM SPNG SZ 100

## (undated) DEVICE — SOLUTION IV 1000 ML 0.9 NACL INJ USP EXCEL PLAS CONTAINER

## (undated) DEVICE — SUTURE MONOCRYL SZ 3-0 L27IN ABSRB UD PS-2 3/8 CIR REV CUT NDL MCP427H

## (undated) DEVICE — GARMENT COMPR M FOR 13IN FT INTMIT SGL BLDR HEM FORC II

## (undated) DEVICE — FOLEY TRAY 1 LAYR 16 FR 10 CC URIMTR 100% SIL SNAPSECURE LF

## (undated) DEVICE — BANDAGE COBAN 6 IN WND 6INX5YD FOAM

## (undated) DEVICE — KIT POS LEG DISP

## (undated) DEVICE — Device: Brand: XPERIENCE

## (undated) DEVICE — SYRINGE MED 30ML STD CLR PLAS LUERLOCK TIP N CTRL DISP

## (undated) DEVICE — GOWN,SIRUS,FABRNF,2XL,18/CS: Brand: MEDLINE

## (undated) DEVICE — NEEDLE SPNL 20GA L3.5IN YEL HUB S STL REG WALL FIT STYL

## (undated) DEVICE — NEEDLE SPNL L3.5IN PNK HUB S STL REG WALL FIT STYL W/ QNCKE

## (undated) DEVICE — GLOVE SURG SZ 85 L12IN FNGR THK79MIL GRN LTX FREE

## (undated) DEVICE — BOOT POS LEG DEMAYO

## (undated) DEVICE — CORD RETRCT SIL - ORDER MULTIPLES OF 10 EACH

## (undated) DEVICE — SUTURE VICRYL + SZ 2-0 L27IN ABSRB UD CT-2 L26MM 1/2 CIR TAPR VCP269H

## (undated) DEVICE — 3 BONE CEMENT MIXER: Brand: MIXEVAC

## (undated) DEVICE — DRAPE C ARM C-ARMOR

## (undated) DEVICE — SUTURE VICRYL + SZ 1 L36IN ABSRB UD L36MM CT-1 1/2 CIR VCP947H

## (undated) DEVICE — BUR SURG DIA3MM PRECIS NEURO 5 STP NOTCH EXPOSE MRK ELITE

## (undated) DEVICE — BLADE SURG SAW STD S STL OSC W/ SERR EDGE DISP

## (undated) DEVICE — ZIMMER® STERILE DISPOSABLE TOURNIQUET CUFF WITH PROTECTIVE SLEEVE AND PLC, SINGLE PORT, SINGLE BLADDER, 34 IN. (86 CM)

## (undated) DEVICE — KIT POS PUR W/ FOAM FRME JACK TBL 4 BOUF CVR PLLW FRME HD

## (undated) DEVICE — DRAPE FULL SHT 70X100 AURORA

## (undated) DEVICE — MARKER SURG SKIN GENTIAN VLT REG TIP W/ 6IN RUL AND BLNK DYNJSM02